# Patient Record
Sex: FEMALE | Race: BLACK OR AFRICAN AMERICAN | Employment: OTHER | URBAN - METROPOLITAN AREA
[De-identification: names, ages, dates, MRNs, and addresses within clinical notes are randomized per-mention and may not be internally consistent; named-entity substitution may affect disease eponyms.]

---

## 2018-01-15 ENCOUNTER — OFFICE VISIT (OUTPATIENT)
Dept: FAMILY MEDICINE CLINIC | Age: 68
End: 2018-01-15

## 2018-01-15 VITALS
WEIGHT: 166 LBS | HEIGHT: 61 IN | TEMPERATURE: 98.3 F | BODY MASS INDEX: 31.34 KG/M2 | OXYGEN SATURATION: 98 % | HEART RATE: 65 BPM | RESPIRATION RATE: 16 BRPM | SYSTOLIC BLOOD PRESSURE: 140 MMHG | DIASTOLIC BLOOD PRESSURE: 80 MMHG

## 2018-01-15 DIAGNOSIS — N32.81 OVERACTIVE BLADDER: ICD-10-CM

## 2018-01-15 DIAGNOSIS — J42 CHRONIC BRONCHITIS, UNSPECIFIED CHRONIC BRONCHITIS TYPE (HCC): ICD-10-CM

## 2018-01-15 DIAGNOSIS — E78.00 PURE HYPERCHOLESTEROLEMIA: ICD-10-CM

## 2018-01-15 DIAGNOSIS — D64.9 ANEMIA, UNSPECIFIED TYPE: ICD-10-CM

## 2018-01-15 DIAGNOSIS — J30.9 CHRONIC ALLERGIC RHINITIS, UNSPECIFIED SEASONALITY, UNSPECIFIED TRIGGER: ICD-10-CM

## 2018-01-15 DIAGNOSIS — J45.909 UNCOMPLICATED ASTHMA, UNSPECIFIED ASTHMA SEVERITY, UNSPECIFIED WHETHER PERSISTENT: ICD-10-CM

## 2018-01-15 RX ORDER — LANOLIN ALCOHOL/MO/W.PET/CERES
325 CREAM (GRAM) TOPICAL
COMMUNITY
End: 2018-04-23

## 2018-01-15 RX ORDER — HYDROCODONE BITARTRATE AND ACETAMINOPHEN 5; 325 MG/1; MG/1
TABLET ORAL
Refills: 0 | COMMUNITY
Start: 2017-10-28 | End: 2019-08-19

## 2018-01-15 RX ORDER — AZITHROMYCIN 250 MG/1
TABLET, FILM COATED ORAL
Qty: 6 TAB | Refills: 0 | Status: SHIPPED | OUTPATIENT
Start: 2018-01-15 | End: 2018-02-15 | Stop reason: ALTCHOICE

## 2018-01-15 RX ORDER — CYCLOSPORINE 0.5 MG/ML
1 EMULSION OPHTHALMIC
COMMUNITY
End: 2021-03-19

## 2018-01-15 RX ORDER — OMEPRAZOLE AND SODIUM BICARBONATE 40; 1100 MG/1; MG/1
40 CAPSULE ORAL
COMMUNITY
End: 2018-03-14 | Stop reason: SDUPTHER

## 2018-01-15 RX ORDER — GABAPENTIN 100 MG/1
100 CAPSULE ORAL 2 TIMES DAILY
COMMUNITY
End: 2018-11-26

## 2018-01-15 RX ORDER — METFORMIN HYDROCHLORIDE 1000 MG/1
1000 TABLET, FILM COATED, EXTENDED RELEASE ORAL 2 TIMES DAILY
COMMUNITY
End: 2018-03-14 | Stop reason: SDUPTHER

## 2018-01-15 NOTE — PATIENT INSTRUCTIONS

## 2018-01-15 NOTE — PROGRESS NOTES
Chief Complaint   Patient presents with   Delilah Sames Establish Care    Headache    Cold    Other     \"Pain\"

## 2018-01-15 NOTE — PROGRESS NOTES
Delvin Boyle, 69 y.o.,  female    SUBJECTIVE  Cough x few days (pt new to the office)    C/o productive cough, nasal congestion, sinus pressure. Pt reports h.o asthma and COPD, using breo and prn albuterol. Denies fever, wheezing, has not needed to use rescue inhaler she says. She follows Dr. Derrell Torre (pulm). Also has chronic allergic rhinitis, and on singulair and flonase. NIDDM- reports being on metformin, glipizide and januvia. Says she gets some hypoglycemic episodes at times. Does not recall last a1c. She reports HL as well, she is not on a statin currently. OAB- reports recently started on oxybutynin and responding well to this. Chronic anemia- says past few years has developed this, on po fe. GERD- on prilosec    Chronic neck pain with radiculopathy- reports following dr. Roland Stock for this, currently on gabapentin/flexeril and norco prn. She lives alone, doing ADLs independently    ROS:  See HPI, all others negative        Patient Active Problem List   Diagnosis Code    Pure hypercholesterolemia E78.00    Overactive bladder N32.81    Chronic allergic rhinitis J30.9    Anemia U65.4    Uncomplicated asthma J42.049    Uncontrolled type 2 diabetes mellitus without complication, without long-term current use of insulin ([facility]) E11.65       Current Outpatient Prescriptions   Medication Sig Dispense Refill    SITagliptin (JANUVIA) 50 mg tablet 50 mg.      metFORMIN (GLUMETZA) 1,000 mg TG24 24 hour tablet 1,000 mg.  ferrous sulfate 325 mg (65 mg iron) tablet 325 mg.      gabapentin (NEURONTIN) 100 mg capsule 100 mg.      omeprazole-sodium bicarbonate (ZEGERID) 40-1.1 mg-gram capsule 40 mg.      fluticasone (VERAMYST) 27.5 mcg/actuation nasal spray [place]  in Nose.  cycloSPORINE (RESTASIS) 0.05 % ophthalmic emulsion Instill 1 Drop in affected eye(s) Every 12 hours.       HYDROcodone-acetaminophen (NORCO) 5-325 mg per tablet   0    mometasone-formoterol (DULERA) 100-5 mcg/actuation HFA inhaler Take 2 Puffs by inhalation two (2) times a day.  azithromycin (ZITHROMAX) 250 mg tablet Take two tablets today then one tablet daily 6 Tab 0    ipratropium-albuterol (COMBIVENT RESPIMAT)  mcg/actuation inhaler Take 1 inhalation inhaled by mouth Take As Needed. Allergies   Allergen Reactions    Iodine Swelling    Nabumetone Swelling    Penicillins Unknown (comments)    Shellfish Containing Products Swelling    Sulfa (Sulfonamide Antibiotics) Swelling       Past Medical History:   Diagnosis Date    Allergic rhinitis     Anemia     Asthma     COPD (chronic obstructive pulmonary disease) (HCC)     Diabetes (HCC)     GERD (gastroesophageal reflux disease)     Headache     Hypercholesterolemia     OAB (overactive bladder)     Radiculopathy        Social History     Social History    Marital status:      Spouse name: N/A    Number of children: N/A    Years of education: N/A     Occupational History    Not on file. Social History Main Topics    Smoking status: Former Smoker     Years: 20.00    Smokeless tobacco: Former User     Quit date: 1/1/1999    Alcohol use Yes    Drug use: No    Sexual activity: Not Currently     Partners: Male     Other Topics Concern    Not on file     Social History Narrative    No narrative on file       History reviewed. No pertinent family history. OBJECTIVE    Physical Exam:     Visit Vitals    /80 (BP 1 Location: Left arm, BP Patient Position: Sitting)    Pulse 65    Temp 98.3 °F (36.8 °C) (Oral)    Resp 16    Ht 5' 1\" (1.549 m)    Wt 166 lb (75.3 kg)    SpO2 98%    BMI 31.37 kg/m2       General: alert, AA, in no apparent distress or pain  Head: atraumatic.  Non-tender maxillary and frontal sinuses  Eyes: Lids with no discharge, no matting, conjunctivae clear and non injected, full EOMs, PERLLA  Ears: pinna non-tender, external auditory canal patent, TM intact  Mouth/throat:tonsils non enlarged, pharynx non erythematous and no lesion, nasal mucosa normal  Neck: supple, no adenopathy palpated  CVS: normal rate, regular rhythm, distinct S1 and S2  Lungs:clear to ausculation bilaterally, no crackles, wheezing or rhonchi noted  Abdomen: normoactive bowel sounds, soft, non-tender  Extremities: no edema, no cyanosis,  Skin: warm, no lesions, rashes noted  Psych:  mood and affect normal        ASSESSMENT/PLAN  Diagnoses and all orders for this visit:    1. Uncontrolled type 2 diabetes mellitus without complication, without long-term current use of insulin ([facility]  Start eval  On metformin, januvia, consider stopping glipizide if w/ hypolgycemia  Advised FBG log and bring to next visit  -     METABOLIC PANEL, COMPREHENSIVE; Future  -     LIPID PANEL; Future  -     HEMOGLOBIN A1C W/O EAG; Future  -     MICROALBUMIN, UR, RAND W/ MICROALBUMIN/CREA RATIO; Future    2. Anemia, unspecified type  H/o, chronic  -     CBC WITH AUTOMATED DIFF; Future    3. Uncomplicated asthma, unspecified asthma severity, unspecified whether persistent  With COPD  Start zpack, cont breo, prn albuterol  Following Dr. Evans Vallejo    4. Overactive bladder  Responding to ditropan, to cont    5. Chronic allergic rhinitis, unspecified seasonality, unspecified trigger  On singulair, flonase    6. Pure hypercholesterolemia    7. Chronic bronchitis, unspecified chronic bronchitis type (HCC)  -     azithromycin (ZITHROMAX) 250 mg tablet; Take two tablets today then one tablet daily      Follow-up Disposition:  Return in about 2 weeks (around 1/29/2018), or if symptoms worsen or fail to improve. Patient understands plan of care. Patient has provided input and agrees with goals.

## 2018-01-15 NOTE — MR AVS SNAPSHOT
Visit Information Date & Time Provider Department Dept. Phone Encounter #  
 1/15/2018 10:30 AM Amelia Montoya, [address] [id number] Follow-up Instructions Return in about 2 weeks (around 1/29/2018), or if symptoms worsen or fail to improve. 2/2/2018  3:30 PM  
Any with Emory Issa MD  
[facility] [address] ([address]) Appt Note: np dx: urinary incontinence - per dr Yomi Peres [facility] [address] Upcoming Health Maintenance Date Due Hepatitis C Screening 1950 DTaP/Tdap/Td series (1 - Tdap) 5/21/1969 BREAST CANCER SCRN MAMMOGRAM 5/21/1998 FOBT Q 1 YEAR AGE 50-75 5/21/1998 ZOSTER VACCINE AGE 60> 3/21/2008 GLAUCOMA SCREENING Q2Y 5/21/2013 OSTEOPOROSIS SCREENING (DEXA) 5/21/2013 Pneumococcal 65+ Low/Medium Risk (1 of 2 - PCV13) 5/21/2013 MEDICARE YEARLY EXAM 5/21/2013 Allergies as of 1/15/2018  Never Reviewed Severity Noted Reaction Type Reactions Iodine  06/11/2010    Swelling Nabumetone  06/11/2010    Swelling Penicillins  06/11/2010    Unknown (comments) Shellfish Containing Products  06/11/2010    Swelling Sulfa (Sulfonamide Antibiotics)  06/11/2010    Swelling Current Immunizations  Never Reviewed No immunizations on file. Not reviewed this visit You Were Diagnosed With   
  
 Codes Comments Uncontrolled type 2 diabetes mellitus without complication, without long-term current use of insulin [facility]    -  Primary ICD-10-CM: E11.65 ICD-9-CM: 250.02 Anemia, unspecified type     ICD-10-CM: D64.9 ICD-9-CM: 285.9 Uncomplicated asthma, unspecified asthma severity, unspecified whether persistent     ICD-10-CM: J45.909 ICD-9-CM: 493.90 Overactive bladder     ICD-10-CM: N32.81 ICD-9-CM: 596.51   
 Chronic allergic rhinitis, unspecified seasonality, unspecified trigger     ICD-10-CM: J30.9 ICD-9-CM: 477.9 Pure hypercholesterolemia     ICD-10-CM: E78.00 ICD-9-CM: 272.0 Chronic bronchitis, unspecified chronic bronchitis type ([facility]     ICD-10-CM: Y63 ICD-9-CM: 491.9 Vitals BP Pulse Temp Resp Height(growth percentile) Weight(growth percentile) 140/80 (BP 1 Location: Left arm, BP Patient Position: Sitting) 65 98.3 °F (36.8 °C) (Oral) 16 5' 1\" (1.549 m) 166 lb (75.3 kg) SpO2 BMI OB Status Smoking Status 98% 31.37 kg/m2 Hysterectomy Former Smoker Vitals History BMI and BSA Data Body Mass Index Body Surface Area  
 31.37 kg/m 2 1.8 m 2 Your Updated Medication List  
  
   
This list is accurate as of: 1/15/18 11:33 AM.  Always use your most recent med list.  
  
  
  
  
 azithromycin 250 mg tablet Commonly known as:  Sharonda Darling Take two tablets today then one tablet daily  
  
 cycloSPORINE 0.05 % ophthalmic emulsion Commonly known as:  RESTASIS Instill 1 Drop in affected eye(s) Every 12 hours. DULERA 100-5 mcg/actuation HFA inhaler Generic drug:  mometasone-formoterol Take 2 Puffs by inhalation two (2) times a day. ferrous sulfate 325 mg (65 mg iron) tablet 325 mg.  
  
 fluticasone 27.5 mcg/actuation nasal spray Commonly known as:  VERAMYST Spray  in Nose.  
  
 gabapentin 100 mg capsule Commonly known as:  NEURONTIN  
100 mg. HYDROcodone-acetaminophen 5-325 mg per tablet Commonly known as:  NORCO  
  
 ipratropium-albuterol  mcg/actuation inhaler Commonly known as:  Merl Lisbeth Take 1 inhalation inhaled by mouth Take As Needed. metFORMIN 1,000 mg Tg24 24 hour tablet Commonly known as:  GLUMETZA  
1,000 mg.  
  
 omeprazole-sodium bicarbonate 40-1.1 mg-gram capsule Commonly known as:  ZEGERID  
40 mg. SITagliptin 50 mg tablet Commonly known as:  Megan Raker 50 mg. Prescriptions Printed Refills  
 azithromycin (ZITHROMAX) 250 mg tablet 0 Sig: Take two tablets today then one tablet daily Class: Print Follow-up Instructions Return in about 2 weeks (around 1/29/2018), or if symptoms worsen or fail to improve. To-Do List   
 01/15/2018 Lab:  CBC WITH AUTOMATED DIFF   
  
 01/15/2018 Lab:  HEMOGLOBIN A1C W/O EAG   
  
 01/15/2018 Lab:  LIPID PANEL   
  
 01/15/2018 Lab:  METABOLIC PANEL, COMPREHENSIVE   
  
 01/15/2018 Lab:  MICROALBUMIN, UR, RAND W/ MICROALBUMIN/CREA RATIO Patient Instructions Learning About Diabetes Food Guidelines Your Care Instructions Meal planning is important to manage diabetes. It helps keep your blood sugar at a target level (which you set with your doctor). You don't have to eat special foods. You can eat what your family eats, including sweets once in a while. But you do have to pay attention to how often you eat and how much you eat of certain foods. You may want to work with a dietitian or a certified diabetes educator (CDE) to help you plan meals and snacks. A dietitian or CDE can also help you lose weight if that is one of your goals. What should you know about eating carbs? Managing the amount of carbohydrate (carbs) you eat is an important part of healthy meals when you have diabetes. Carbohydrate is found in many foods. · Learn which foods have carbs. And learn the amounts of carbs in different foods. ¨ Bread, cereal, pasta, and rice have about 15 grams of carbs in a serving. A serving is 1 slice of bread (1 ounce), ½ cup of cooked cereal, or 1/3 cup of cooked pasta or rice. ¨ Fruits have 15 grams of carbs in a serving. A serving is 1 small fresh fruit, such as an apple or orange; ½ of a banana; ½ cup of cooked or canned fruit; ½ cup of fruit juice; 1 cup of melon or raspberries; or 2 tablespoons of dried fruit. ¨ Milk and no-sugar-added yogurt have 15 grams of carbs in a serving. A serving is 1 cup of milk or 2/3 cup of no-sugar-added yogurt. ¨ Starchy vegetables have 15 grams of carbs in a serving. A serving is ½ cup of mashed potatoes or sweet potato; 1 cup winter squash; ½ of a small baked potato; ½ cup of cooked beans; or ½ cup cooked corn or green peas. · Learn how much carbs to eat each day and at each meal. A dietitian or CDE can teach you how to keep track of the amount of carbs you eat. This is called carbohydrate counting. · If you are not sure how to count carbohydrate grams, use the Plate Method to plan meals. It is a good, quick way to make sure that you have a balanced meal. It also helps you spread carbs throughout the day. ¨ Divide your plate by types of foods. Put non-starchy vegetables on half the plate, meat or other protein food on one-quarter of the plate, and a grain or starchy vegetable in the final quarter of the plate. To this you can add a small piece of fruit and 1 cup of milk or yogurt, depending on how many carbs you are supposed to eat at a meal. 
· Try to eat about the same amount of carbs at each meal. Do not \"save up\" your daily allowance of carbs to eat at one meal. 
· Proteins have very little or no carbs per serving. Examples of proteins are beef, chicken, turkey, fish, eggs, tofu, cheese, cottage cheese, and peanut butter. A serving size of meat is 3 ounces, which is about the size of a deck of cards. Examples of meat substitute serving sizes (equal to 1 ounce of meat) are 1/4 cup of cottage cheese, 1 egg, 1 tablespoon of peanut butter, and ½ cup of tofu. How can you eat out and still eat healthy? · Learn to estimate the serving sizes of foods that have carbohydrate. If you measure food at home, it will be easier to estimate the amount in a serving of restaurant food.  
· If the meal you order has too much carbohydrate (such as potatoes, corn, or baked beans), ask to have a low-carbohydrate food instead. Ask for a salad or green vegetables. · If you use insulin, check your blood sugar before and after eating out to help you plan how much to eat in the future. · If you eat more carbohydrate at a meal than you had planned, take a walk or do other exercise. This will help lower your blood sugar. What else should you know? · Limit saturated fat, such as the fat from meat and dairy products. This is a healthy choice because people who have diabetes are at higher risk of heart disease. So choose lean cuts of meat and nonfat or low-fat dairy products. Use olive or canola oil instead of butter or shortening when cooking. · Don't skip meals. Your blood sugar may drop too low if you skip meals and take insulin or certain medicines for diabetes. · Check with your doctor before you drink alcohol. Alcohol can cause your blood sugar to drop too low. Alcohol can also cause a bad reaction if you take certain diabetes medicines. Follow-up care is a key part of your treatment and safety. Be sure to make and go to all appointments, and call your doctor if you are having problems. It's also a good idea to know your test results and keep a list of the medicines you take. Where can you learn more? Go to http://danika-sophia.info/. Enter U061 in the search box to learn more about \"Learning About Diabetes Food Guidelines. \" Current as of: March 13, 2017 Content Version: 11.4 © 5473-6848 [employer]. Care instructions adapted under license by [employer] (which disclaims liability or warranty for this information). If you have questions about a medical condition or this instruction, always ask your healthcare professional. [place] [address] any warranty or liability for your use of this information. Introducing [facility]!    
 Lasha Robert introduces [employer] patient portal. Now you can access parts of your medical record, email your doctor's office, and request medication refills online. 1. In your internet browser, go to [email or web address] 2. Click on the First Time User? Click Here link in the Sign In box. You will see the New Member Sign Up page. 3. Enter your [employer] Access Code exactly as it appears below. You will not need to use this code after youve completed the sign-up process. If you do not sign up before the expiration date, you must request a new code. · [employer] Access Code: [email or web address] Expires: 4/15/2018 11:33 AM 
 
4. Enter the last four digits of your Social Security Number (xxxx) and Date of Birth (mm/dd/yyyy) as indicated and click Submit. You will be taken to the next sign-up page. 5. Create a [employer] ID. This will be your [employer] login ID and cannot be changed, so think of one that is secure and easy to remember. 6. Create a [employer] password. You can change your password at any time. 7. Enter your Password Reset Question and Answer. This can be used at a later time if you forget your password. 8. Enter your e-mail address. You will receive e-mail notification when new information is available in [address] 9. Click Sign Up. You can now view and download portions of your medical record. 10. Click the Download Summary menu link to download a portable copy of your medical information. If you have questions, please visit the Frequently Asked Questions section of the [employer] website. Remember, [employer] is NOT to be used for urgent needs. For medical emergencies, dial 911. Now available from your iPhone and Android! Please provide this summary of care documentation to your next provider. If you have any questions after today's visit, please call [phone].

## 2018-02-12 ENCOUNTER — HOSPITAL ENCOUNTER (OUTPATIENT)
Dept: LAB | Age: 68
Discharge: HOME | End: 2018-02-12
Payer: MEDICARE

## 2018-02-12 LAB
ALBUMIN SERPL-MCNC: 3.5 G/DL (ref 3.4–5)
ALBUMIN/GLOB SERPL: 1.1 {RATIO} (ref 0.8–1.7)
ALP SERPL-CCNC: 75 U/L (ref 45–117)
ALT SERPL-CCNC: 10 U/L (ref 13–56)
ANION GAP SERPL CALC-SCNC: 5 MMOL/L (ref 3–18)
AST SERPL-CCNC: 12 U/L (ref 15–37)
BASOPHILS # BLD: 0 K/UL (ref 0–0.06)
BASOPHILS NFR BLD: 0 % (ref 0–2)
BILIRUB SERPL-MCNC: 0.3 MG/DL (ref 0.2–1)
BUN SERPL-MCNC: 11 MG/DL (ref 7–18)
BUN/CREAT SERPL: 10 (ref 12–20)
CALCIUM SERPL-MCNC: 9 MG/DL (ref 8.5–10.1)
CHLORIDE SERPL-SCNC: 109 MMOL/L (ref 100–108)
CHOLEST SERPL-MCNC: 182 MG/DL
CO2 SERPL-SCNC: 29 MMOL/L (ref 21–32)
CREAT SERPL-MCNC: 1.05 MG/DL (ref 0.6–1.3)
CREAT UR-MCNC: 52.3 MG/DL (ref 30–125)
DIFFERENTIAL METHOD BLD: ABNORMAL
EOSINOPHIL # BLD: 0.2 K/UL (ref 0–0.4)
EOSINOPHIL NFR BLD: 3 % (ref 0–5)
ERYTHROCYTE [DISTWIDTH] IN BLOOD BY AUTOMATED COUNT: 15.8 % (ref 11.6–14.5)
GLOBULIN SER CALC-MCNC: 3.2 G/DL (ref 2–4)
GLUCOSE SERPL-MCNC: 103 MG/DL (ref 74–99)
HBA1C MFR BLD: 6.9 % (ref 4.2–5.6)
HCT VFR BLD AUTO: 29.1 % (ref 35–45)
HDLC SERPL-MCNC: 71 MG/DL (ref 40–60)
HDLC SERPL: 2.6 {RATIO} (ref 0–5)
HGB BLD-MCNC: 9.8 G/DL (ref 12–16)
LDLC SERPL CALC-MCNC: 94.2 MG/DL (ref 0–100)
LIPID PROFILE,FLP: ABNORMAL
LYMPHOCYTES # BLD: 1.8 K/UL (ref 0.9–3.6)
LYMPHOCYTES NFR BLD: 34 % (ref 21–52)
MCH RBC QN AUTO: 27.3 PG (ref 24–34)
MCHC RBC AUTO-ENTMCNC: 33.7 G/DL (ref 31–37)
MCV RBC AUTO: 81.1 FL (ref 74–97)
MICROALBUMIN UR-MCNC: 0.74 MG/DL (ref 0–3)
MICROALBUMIN/CREAT UR-RTO: 14 MG/G (ref 0–30)
MONOCYTES # BLD: 0.4 K/UL (ref 0.05–1.2)
MONOCYTES NFR BLD: 8 % (ref 3–10)
NEUTS SEG # BLD: 2.9 K/UL (ref 1.8–8)
NEUTS SEG NFR BLD: 55 % (ref 40–73)
PLATELET # BLD AUTO: 202 K/UL (ref 135–420)
PMV BLD AUTO: 9.1 FL (ref 9.2–11.8)
POTASSIUM SERPL-SCNC: 4.3 MMOL/L (ref 3.5–5.5)
PROT SERPL-MCNC: 6.7 G/DL (ref 6.4–8.2)
RBC # BLD AUTO: 3.59 M/UL (ref 4.2–5.3)
SODIUM SERPL-SCNC: 143 MMOL/L (ref 136–145)
TRIGL SERPL-MCNC: 84 MG/DL (ref ?–150)
VLDLC SERPL CALC-MCNC: 16.8 MG/DL
WBC # BLD AUTO: 5.4 K/UL (ref 4.6–13.2)

## 2018-02-12 PROCEDURE — 85025 COMPLETE CBC W/AUTO DIFF WBC: CPT | Performed by: FAMILY MEDICINE

## 2018-02-12 PROCEDURE — 36415 COLL VENOUS BLD VENIPUNCTURE: CPT | Performed by: FAMILY MEDICINE

## 2018-02-12 PROCEDURE — 82043 UR ALBUMIN QUANTITATIVE: CPT | Performed by: FAMILY MEDICINE

## 2018-02-12 PROCEDURE — 83036 HEMOGLOBIN GLYCOSYLATED A1C: CPT | Performed by: FAMILY MEDICINE

## 2018-02-12 PROCEDURE — 80061 LIPID PANEL: CPT | Performed by: FAMILY MEDICINE

## 2018-02-12 PROCEDURE — 80053 COMPREHEN METABOLIC PANEL: CPT | Performed by: FAMILY MEDICINE

## 2018-02-15 ENCOUNTER — OFFICE VISIT (OUTPATIENT)
Dept: FAMILY MEDICINE CLINIC | Age: 68
End: 2018-02-15

## 2018-02-15 VITALS
HEIGHT: 61 IN | OXYGEN SATURATION: 96 % | WEIGHT: 164 LBS | SYSTOLIC BLOOD PRESSURE: 118 MMHG | RESPIRATION RATE: 16 BRPM | TEMPERATURE: 98.1 F | BODY MASS INDEX: 30.96 KG/M2 | HEART RATE: 77 BPM | DIASTOLIC BLOOD PRESSURE: 60 MMHG

## 2018-02-15 DIAGNOSIS — Z78.0 POST-MENOPAUSAL: ICD-10-CM

## 2018-02-15 DIAGNOSIS — E78.00 PURE HYPERCHOLESTEROLEMIA: ICD-10-CM

## 2018-02-15 DIAGNOSIS — D64.9 NORMOCYTIC ANEMIA: ICD-10-CM

## 2018-02-15 DIAGNOSIS — Z11.59 ENCOUNTER FOR HEPATITIS C SCREENING TEST FOR LOW RISK PATIENT: ICD-10-CM

## 2018-02-15 DIAGNOSIS — E11.9 TYPE 2 DIABETES MELLITUS WITHOUT COMPLICATION, WITHOUT LONG-TERM CURRENT USE OF INSULIN (HCC): Primary | ICD-10-CM

## 2018-02-15 DIAGNOSIS — N32.81 OVERACTIVE BLADDER: ICD-10-CM

## 2018-02-15 DIAGNOSIS — L84 FOOT CALLUS: ICD-10-CM

## 2018-02-15 PROBLEM — E11.21 TYPE 2 DIABETES WITH NEPHROPATHY (HCC): Status: RESOLVED | Noted: 2018-02-15 | Resolved: 2018-02-15

## 2018-02-15 PROBLEM — E11.21 TYPE 2 DIABETES WITH NEPHROPATHY (HCC): Status: ACTIVE | Noted: 2018-02-15

## 2018-02-15 RX ORDER — OXYBUTYNIN CHLORIDE 5 MG/1
5 TABLET ORAL 2 TIMES DAILY
Qty: 60 TAB | Refills: 0
Start: 2018-02-15 | End: 2019-02-12 | Stop reason: SDUPTHER

## 2018-02-15 RX ORDER — ATORVASTATIN CALCIUM 10 MG/1
10 TABLET, FILM COATED ORAL DAILY
Qty: 90 TAB | Refills: 0 | Status: SHIPPED | OUTPATIENT
Start: 2018-02-15 | End: 2018-03-14 | Stop reason: SDUPTHER

## 2018-02-15 NOTE — PROGRESS NOTES
Irene Novoa, 69 y.o.,  female    SUBJECTIVE  Ff-up      NIDDM-FBG Log  . reports taking meds. No longer with hypoglycemia. revieweed labs    She reports HL as well, she is not on a statin currently. OAB- reports recently started on oxybutynin and responding well to this. Chronic anemia- says past few years has developed this, on po fe. Reports normal colonoscopy [facility]. GERD- on prilosec    Chronic neck pain with radiculopathy- reports following dr. Ting Villareal for this, currently on gabapentin/flexeril and norco prn. 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 Cough has improved- following Dr. Venus Guzman for asthma  CHRIS on CPAP- following  Dr. Tiff Martinez doing well with every few months counseling dr. Cameron Gates    She lives alone, doing ADLs independently    ROS:  See HPI, all others negative        Patient Active Problem List   Diagnosis Code    Pure hypercholesterolemia E78.00    Overactive bladder N32.81    Chronic allergic rhinitis J30.9    Anemia F77.6    Uncomplicated asthma Y03.487    Type 2 diabetes mellitus without complication, without long-term current use of insulin (HCC) E11.9       Current Outpatient Prescriptions   Medication Sig Dispense Refill    atorvastatin (LIPITOR) 10 mg tablet Take 1 Tab by mouth daily. 90 Tab 0    oxybutynin (DITROPAN) 5 mg tablet Take 1 Tab by mouth two (2) times a day. 60 Tab 0    SITagliptin (JANUVIA) 50 mg tablet 50 mg.      metFORMIN (GLUMETZA) 1,000 mg TG24 24 hour tablet 1,000 mg two (2) times a day.  ferrous sulfate 325 mg (65 mg iron) tablet 325 mg.      gabapentin (NEURONTIN) 100 mg capsule 100 mg.      omeprazole-sodium bicarbonate (ZEGERID) 40-1.1 mg-gram capsule 40 mg.      fluticasone (VERAMYST) 27.5 mcg/actuation nasal spray [place]  in Nose.  cycloSPORINE (RESTASIS) 0.05 % ophthalmic emulsion Instill 1 Drop in affected eye(s) Every 12 hours.       HYDROcodone-acetaminophen (Caitlin Alex) 5-325 mg per tablet   0    ipratropium-albuterol (COMBIVENT RESPIMAT)  mcg/actuation inhaler Take 1 inhalation inhaled by mouth Take As Needed.  mometasone-formoterol (DULERA) 100-5 mcg/actuation HFA inhaler Take 2 Puffs by inhalation two (2) times a day. Allergies   Allergen Reactions    Iodine Swelling    Nabumetone Swelling    Penicillins Unknown (comments)    Shellfish Containing Products Swelling    Sulfa (Sulfonamide Antibiotics) Swelling       Past Medical History:   Diagnosis Date    Allergic rhinitis     Anemia     Asthma     Candida vaginitis     CKD (chronic kidney disease) stage 3, GFR 30-59 ml/min     [facility]     Diabetes [facility]     Diabetes ([facility]     GERD (gastroesophageal reflux disease)     Headache     Hypercholesterolemia     Mild dementia     OAB (overactive bladder)     CHRIS on CPAP     Radiculopathy     Urinary incontinence     UTI (urinary tract infection)        Social History     Social History    Marital status:      Spouse name: N/A    Number of children: N/A    Years of education: N/A     Occupational History    Not on file. Social History Main Topics    Smoking status: Former Smoker     Years: 20.00    Smokeless tobacco: Former User     Quit date: 1/1/1999    Alcohol use Yes    Drug use: No    Sexual activity: Not Currently     Partners: Male     Other Topics Concern    Not on file     Social History Narrative       No family history on file.       OBJECTIVE    Physical Exam:     Visit Vitals    /60 (BP 1 Location: Left arm, BP Patient Position: Sitting)    Pulse 77    Temp 98.1 °F (36.7 °C) (Oral)    Resp 16    Ht 5' 1\" (1.549 m)    Wt 164 lb (74.4 kg)    SpO2 96%    BMI 30.99 kg/m2       General: alert, AA, in no apparent distress or pain  CVS: normal rate, regular rhythm, distinct S1 and S2  Lungs:clear to ausculation bilaterally, no crackles, wheezing or rhonchi noted  Abdomen: normoactive bowel sounds, soft, non-tender  Extremities: no edema, no cyanosis, feet: calluses on both soles R>L  Skin: warm, no lesions, rashes noted  Psych:  mood and affect normal    Results for orders placed or performed during the hospital encounter of 02/12/18   CBC WITH AUTOMATED DIFF   Result Value Ref Range    WBC 5.4 4.6 - 13.2 K/uL    RBC 3.59 (L) 4.20 - 5.30 M/uL    HGB 9.8 (L) 12.0 - 16.0 g/dL    HCT 29.1 (L) 35.0 - 45.0 %    MCV 81.1 74.0 - 97.0 FL    MCH 27.3 24.0 - 34.0 PG    MCHC 33.7 31.0 - 37.0 g/dL    RDW 15.8 (H) 11.6 - 14.5 %    PLATELET 180 188 - 646 K/uL    MPV 9.1 (L) 9.2 - 11.8 FL    NEUTROPHILS 55 40 - 73 %    LYMPHOCYTES 34 21 - 52 %    MONOCYTES 8 3 - 10 %    EOSINOPHILS 3 0 - 5 %    BASOPHILS 0 0 - 2 %    ABS. NEUTROPHILS 2.9 1.8 - 8.0 K/UL    ABS. LYMPHOCYTES 1.8 0.9 - 3.6 K/UL    ABS. MONOCYTES 0.4 0.05 - 1.2 K/UL    ABS. EOSINOPHILS 0.2 0.0 - 0.4 K/UL    ABS. BASOPHILS 0.0 0.0 - 0.06 K/UL    DF AUTOMATED     LIPID PANEL   Result Value Ref Range    LIPID PROFILE          Cholesterol, total 182 <200 MG/DL    Triglyceride 84 <150 MG/DL    HDL Cholesterol 71 (H) 40 - 60 MG/DL    LDL, calculated 94.2 0 - 100 MG/DL    VLDL, calculated 16.8 MG/DL    CHOL/HDL Ratio 2.6 0 - 5.0     METABOLIC PANEL, COMPREHENSIVE   Result Value Ref Range    Sodium 143 136 - 145 mmol/L    Potassium 4.3 3.5 - 5.5 mmol/L    Chloride 109 (H) 100 - 108 mmol/L    CO2 29 21 - 32 mmol/L    Anion gap 5 3.0 - 18 mmol/L    Glucose 103 (H) 74 - 99 mg/dL    BUN 11 7.0 - 18 MG/DL    Creatinine 1.05 0.6 - 1.3 MG/DL    BUN/Creatinine ratio 10 (L) 12 - 20      GFR est AA >60 >60 ml/min/1.73m2    GFR est non-AA 52 (L) >60 ml/min/1.73m2    Calcium 9.0 8.5 - 10.1 MG/DL    Bilirubin, total 0.3 0.2 - 1.0 MG/DL    ALT (SGPT) 10 (L) 13 - 56 U/L    AST (SGOT) 12 (L) 15 - 37 U/L    Alk.  phosphatase 75 45 - 117 U/L    Protein, total 6.7 6.4 - 8.2 g/dL    Albumin 3.5 3.4 - 5.0 g/dL    Globulin 3.2 2.0 - 4.0 g/dL    A-G Ratio 1.1 0.8 - 1.7     MICROALBUMIN, UR, RAND   Result Value Ref Range    Microalbumin,urine random 0.74 0 - 3.0 MG/DL    Creatinine, urine 52.30 30 - 125 mg/dL    Microalbumin/Creat ratio (mg/g creat) 14 0 - 30 mg/g   HEMOGLOBIN A1C W/O EAG   Result Value Ref Range    Hemoglobin A1c 6.9 (H) 4.2 - 5.6 %         ASSESSMENT/PLAN  Diagnoses and all orders for this visit:    1. Type 2 diabetes mellitus without complication, without long-term current use of insulin (HCC)  -      DIABETES FOOT EXAM  -     REFERRAL TO PODIATRY  Controlled  Cont current regimen  Eye exam- inquire on date and doctor  Foot exam- 2/18  Microalbumin 2/18  Lipid panel 2/18  a1c 2/18    2. Pure hypercholesterolemia  Andi cv risk 19.5% advised to start on statin  Trial lipitor 10 mg qhs  Recheck in 3 months    3. Overactive bladder  Responding to ditropan, to cont    4. Normocytic anemia  Per pt normal colonoscopy [facility]. - [facility], request records  Check:  -     IRON PROFILE; Future  -     FERRITIN; Future    5. Encounter for hepatitis C screening test for low risk patient  -     HEPATITIS C AB; Future    6. Post-menopausal  -     DEXA BONE DENSITY STUDY AXIAL; Future    7. Foot callus  -     REFERRAL TO PODIATRY    Other orders  -     atorvastatin (LIPITOR) 10 mg tablet; Take 1 Tab by mouth daily. -     oxybutynin (DITROPAN) 5 mg tablet; Take 1 Tab by mouth two (2) times a day. Will  Request colonoscopy, dilated eye exam records. Follow-up Disposition:  Return in about 2 months (around 4/15/2018), or if symptoms worsen or fail to improve. Patient understands plan of care. Patient has provided input and agrees with goals.

## 2018-02-15 NOTE — MR AVS SNAPSHOT
[facility] [address] 
[phone] Patient: Ady Dumont MRN: [id number] WIP:[id number] Visit Information Date & Time Provider Department Dept. Phone Encounter #  
 2/15/2018 10:15 AM Marcelino Yao, [address] [place] [address] Road [id number] Follow-up Instructions Return in about 2 months (around 4/15/2018), or if symptoms worsen or fail to improve. Upcoming Health Maintenance Date Due Hepatitis C Screening 1950 FOOT EXAM Q1 5/21/1958 EYE EXAM RETINAL OR DILATED Q1 5/21/1958 BREAST CANCER SCRN MAMMOGRAM 5/21/1998 FOBT Q 1 YEAR AGE 50-75 5/21/1998 ZOSTER VACCINE AGE 60> 3/21/2008 GLAUCOMA SCREENING Q2Y 5/21/2013 OSTEOPOROSIS SCREENING (DEXA) 5/21/2013 HEMOGLOBIN A1C Q6M 8/12/2018 MICROALBUMIN Q1 2/12/2019 LIPID PANEL Q1 2/12/2019 MEDICARE YEARLY EXAM 2/16/2019 DTaP/Tdap/Td series (2 - Td) 7/29/2024 Allergies as of 2/15/2018  Review Complete On: 2/15/2018 By: Shelia Aguila LPN Severity Noted Reaction Type Reactions Iodine  06/11/2010    Swelling Nabumetone  06/11/2010    Swelling Penicillins  06/11/2010    Unknown (comments) Shellfish Containing Products  06/11/2010    Swelling Sulfa (Sulfonamide Antibiotics)  06/11/2010    Swelling Current Immunizations  Reviewed on 2/14/2018 Name Date Pneumococcal Conjugate (PCV-13) 6/23/2016 Pneumococcal Vaccine (Unspecified Type) 10/3/2017 Tdap 7/29/2014 12:00 AM  
  
 Not reviewed this visit You Were Diagnosed With   
  
 Codes Comments Type 2 diabetes mellitus without complication, without long-term current use of insulin (HCC)    -  Primary ICD-10-CM: E11.9 ICD-9-CM: 250.00 Pure hypercholesterolemia     ICD-10-CM: E78.00 ICD-9-CM: 272.0 Overactive bladder     ICD-10-CM: N32.81 ICD-9-CM: 596.51 Normocytic anemia     ICD-10-CM: D64.9 ICD-9-CM: 285.9 Encounter for hepatitis C screening test for low risk patient     ICD-10-CM: Z11.59 
ICD-9-CM: V73.89 Post-menopausal     ICD-10-CM: Z78.0 ICD-9-CM: V49.81 Foot callus     ICD-10-CM: L84 
ICD-9-CM: 535 Vitals BP Pulse Temp Resp Height(growth percentile) Weight(growth percentile)  
 118/60 (BP 1 Location: Left arm, BP Patient Position: Sitting) 77 98.1 °F (36.7 °C) (Oral) 16 5' 1\" (1.549 m) 164 lb (74.4 kg) SpO2 BMI OB Status Smoking Status 96% 30.99 kg/m2 Hysterectomy Former Smoker BMI and BSA Data Body Mass Index Body Surface Area 30.99 kg/m 2 1.79 m 2 Preferred Pharmacy Pharmacy Name Phone [address] [facility], [address] [facility] Your Updated Medication List  
  
   
This list is accurate as of: 2/15/18 11:08 AM.  Always use your most recent med list.  
  
  
  
  
 atorvastatin 10 mg tablet Commonly known as:  LIPITOR Take 1 Tab by mouth daily. cycloSPORINE 0.05 % ophthalmic emulsion Commonly known as:  RESTASIS Instill 1 Drop in affected eye(s) Every 12 hours. DULERA 100-5 mcg/actuation HFA inhaler Generic drug:  mometasone-formoterol Take 2 Puffs by inhalation two (2) times a day. ferrous sulfate 325 mg (65 mg iron) tablet 325 mg.  
  
 fluticasone 27.5 mcg/actuation nasal spray Commonly known as:  VERAMYST Spray  in Nose.  
  
 gabapentin 100 mg capsule Commonly known as:  NEURONTIN  
100 mg. HYDROcodone-acetaminophen 5-325 mg per tablet Commonly known as:  NORCO  
  
 ipratropium-albuterol  mcg/actuation inhaler Commonly known as:  Ton Rachel Take 1 inhalation inhaled by mouth Take As Needed. metFORMIN 1,000 mg Tg24 24 hour tablet Commonly known as:  GLUMETZA  
1,000 mg two (2) times a day. omeprazole-sodium bicarbonate 40-1.1 mg-gram capsule Commonly known as:  ZEGERID  
40 mg.  
  
 oxybutynin 5 mg tablet Commonly known as:  PAJFDVOA Take 1 Tab by mouth two (2) times a day. SITagliptin 50 mg tablet Commonly known as:  Miguel Ángel Remedies 50 mg.  
  
  
  
  
Prescriptions Sent to Pharmacy Refills  
 atorvastatin (LIPITOR) 10 mg tablet 0 Sig: Take 1 Tab by mouth daily. Class: Normal  
 Pharmacy: [address] Ph #: [phone] Route: Oral  
  
We Performed the Following  DIABETES FOOT EXAM [HM7 Custom] REFERRAL TO PODIATRY [REF90 Custom] Follow-up Instructions Return in about 2 months (around 4/15/2018), or if symptoms worsen or fail to improve. To-Do List   
 02/15/2018 Imaging:  DEXA BONE DENSITY STUDY AXIAL   
  
 02/15/2018 Lab:  FERRITIN   
  
 02/15/2018 Lab:  HEPATITIS C AB   
  
 02/15/2018 Lab:  IRON PROFILE Referral Information Referral ID Referred By Referred To  
  
 [id number] SUNITH[facility] STEINBERG [facility], [address] [place] [address] Str. Phone: [phone] Fax: [phone] Visits Status Start Date End Date 1 New Request 2/15/18 2/15/19 If your referral has a status of pending review or denied, additional information will be sent to support the outcome of this decision. Patient Instructions Learning About Diabetes Food Guidelines Your Care Instructions Meal planning is important to manage diabetes. It helps keep your blood sugar at a target level (which you set with your doctor). You don't have to eat special foods. You can eat what your family eats, including sweets once in a while. But you do have to pay attention to how often you eat and how much you eat of certain foods. You may want to work with a dietitian or a certified diabetes educator (CDE) to help you plan meals and snacks. A dietitian or CDE can also help you lose weight if that is one of your goals. What should you know about eating carbs? Managing the amount of carbohydrate (carbs) you eat is an important part of healthy meals when you have diabetes. Carbohydrate is found in many foods. · Learn which foods have carbs. And learn the amounts of carbs in different foods. ¨ Bread, cereal, pasta, and rice have about 15 grams of carbs in a serving. A serving is 1 slice of bread (1 ounce), ½ cup of cooked cereal, or 1/3 cup of cooked pasta or rice. ¨ Fruits have 15 grams of carbs in a serving. A serving is 1 small fresh fruit, such as an apple or orange; ½ of a banana; ½ cup of cooked or canned fruit; ½ cup of fruit juice; 1 cup of melon or raspberries; or 2 tablespoons of dried fruit. ¨ Milk and no-sugar-added yogurt have 15 grams of carbs in a serving. A serving is 1 cup of milk or 2/3 cup of no-sugar-added yogurt. ¨ Starchy vegetables have 15 grams of carbs in a serving. A serving is ½ cup of mashed potatoes or sweet potato; 1 cup winter squash; ½ of a small baked potato; ½ cup of cooked beans; or ½ cup cooked corn or green peas. · Learn how much carbs to eat each day and at each meal. A dietitian or CDE can teach you how to keep track of the amount of carbs you eat. This is called carbohydrate counting. · If you are not sure how to count carbohydrate grams, use the Plate Method to plan meals. It is a good, quick way to make sure that you have a balanced meal. It also helps you spread carbs throughout the day. ¨ Divide your plate by types of foods. Put non-starchy vegetables on half the plate, meat or other protein food on one-quarter of the plate, and a grain or starchy vegetable in the final quarter of the plate.  To this you can add a small piece of fruit and 1 cup of milk or yogurt, depending on how many carbs you are supposed to eat at a meal. 
· Try to eat about the same amount of carbs at each meal. Do not \"save up\" your daily allowance of carbs to eat at one meal. 
 · Proteins have very little or no carbs per serving. Examples of proteins are beef, chicken, turkey, fish, eggs, tofu, cheese, cottage cheese, and peanut butter. A serving size of meat is 3 ounces, which is about the size of a deck of cards. Examples of meat substitute serving sizes (equal to 1 ounce of meat) are 1/4 cup of cottage cheese, 1 egg, 1 tablespoon of peanut butter, and ½ cup of tofu. How can you eat out and still eat healthy? · Learn to estimate the serving sizes of foods that have carbohydrate. If you measure food at home, it will be easier to estimate the amount in a serving of restaurant food. · If the meal you order has too much carbohydrate (such as potatoes, corn, or baked beans), ask to have a low-carbohydrate food instead. Ask for a salad or green vegetables. · If you use insulin, check your blood sugar before and after eating out to help you plan how much to eat in the future. · If you eat more carbohydrate at a meal than you had planned, take a walk or do other exercise. This will help lower your blood sugar. What else should you know? · Limit saturated fat, such as the fat from meat and dairy products. This is a healthy choice because people who have diabetes are at higher risk of heart disease. So choose lean cuts of meat and nonfat or low-fat dairy products. Use olive or canola oil instead of butter or shortening when cooking. · Don't skip meals. Your blood sugar may drop too low if you skip meals and take insulin or certain medicines for diabetes. · Check with your doctor before you drink alcohol. Alcohol can cause your blood sugar to drop too low. Alcohol can also cause a bad reaction if you take certain diabetes medicines. Follow-up care is a key part of your treatment and safety. Be sure to make and go to all appointments, and call your doctor if you are having problems. It's also a good idea to know your test results and keep a list of the medicines you take. Where can you learn more? Go to http://danika-sophia.info/. Enter B525 in the search box to learn more about \"Learning About Diabetes Food Guidelines. \" Current as of: March 13, 2017 Content Version: 11.4 © 4044-1104 Healthwise, Incorporated. Care instructions adapted under license by [employer] (which disclaims liability or warranty for this information). If you have questions about a medical condition or this instruction, always ask your healthcare professional. [address] any warranty or liability for your use of this information. Introducing [facility]! Debbora Form introduces [employer] patient portal. Now you can access parts of your medical record, email your doctor's office, and request medication refills online. 1. In your internet browser, go to https://Source Audio. Tap 'n Tap/Source Audio 2. Click on the First Time User? Click Here link in the Sign In box. You will see the New Member Sign Up page. 3. Enter your [employer] Access Code exactly as it appears below. You will not need to use this code after youve completed the sign-up process. If you do not sign up before the expiration date, you must request a new code. · [employer] Access Code: [email or web address] Expires: 4/15/2018 11:33 AM 
 
4. Enter the last four digits of your Social Security Number (xxxx) and Date of Birth (mm/dd/yyyy) as indicated and click Submit. You will be taken to the next sign-up page. 5. Create a NewVoiceMediat ID. This will be your [employer] login ID and cannot be changed, so think of one that is secure and easy to remember. 6. Create a [employer] password. You can change your password at any time. 7. Enter your Password Reset Question and Answer. This can be used at a later time if you forget your password. 8. Enter your e-mail address. You will receive e-mail notification when new information is available in [address] 9. Click Sign Up. You can now view and download portions of your medical record. 10. Click the Download Summary menu link to download a portable copy of your medical information. If you have questions, please visit the Frequently Asked Questions section of the [employer] website. Remember, [employer] is NOT to be used for urgent needs. For medical emergencies, dial 911. Now available from your iPhone and Android! Please provide this summary of care documentation to your next provider. Your primary care clinician is listed as Sabino Gutierrez. If you have any questions after today's visit, please call [phone].

## 2018-02-15 NOTE — PROGRESS NOTES
Chief Complaint   Patient presents with    Diabetes    Anemia    Asthma    Overactive Bladder    Results     1. Have you been to the ER, urgent care clinic since your last visit? Hospitalized since your last visit? No    2. Have you seen or consulted any other health care providers outside of the [address] [place] [address] since your last visit? Include any pap smears or colon screening.  Yes When: 1/30/18 Where: Dr. Nabeel Mack and PT Reason for visit: Shoulder injury

## 2018-02-15 NOTE — PATIENT INSTRUCTIONS

## 2018-02-23 ENCOUNTER — HOSPITAL ENCOUNTER (OUTPATIENT)
Dept: BONE DENSITY | Age: 68
Discharge: HOME | End: 2018-02-23
Attending: FAMILY MEDICINE
Payer: MEDICARE

## 2018-02-23 DIAGNOSIS — Z78.0 POST-MENOPAUSAL: ICD-10-CM

## 2018-02-23 PROCEDURE — 77080 DXA BONE DENSITY AXIAL: CPT

## 2018-03-08 ENCOUNTER — HOSPITAL ENCOUNTER (OUTPATIENT)
Dept: LAB | Age: 68
Discharge: HOME | End: 2018-03-08

## 2018-03-08 PROCEDURE — 99001 SPECIMEN HANDLING PT-LAB: CPT | Performed by: FAMILY MEDICINE

## 2018-03-09 LAB
FERRITIN SERPL-MCNC: 193 NG/ML (ref 15–150)
HCV AB S/CO SERPL IA: <0.1 S/CO RATIO (ref 0–0.9)
IRON SATN MFR SERPL: 17 % (ref 15–55)
IRON SERPL-MCNC: 39 UG/DL (ref 27–139)
TIBC SERPL-MCNC: 226 UG/DL (ref 250–450)
UIBC SERPL-MCNC: 187 UG/DL (ref 118–369)

## 2018-03-14 NOTE — TELEPHONE ENCOUNTER
This patient contacted office for the following prescriptions to be filled:    Medication requested :   Requested Prescriptions     Pending Prescriptions Disp Refills    omeprazole-sodium bicarbonate (ZEGERID) 40-1.1 mg-gram capsule      gabapentin (NEURONTIN) 100 mg capsule       Si Cap.  metFORMIN (GLUMETZA) 1,000 mg TG24 24 hour tablet       Si,000 mg two (2) times a day.  atorvastatin (LIPITOR) 10 mg tablet 90 Tab 0     Sig: Take 1 Tab by mouth daily.  ipratropium-albuterol (COMBIVENT RESPIMAT)  mcg/actuation inhaler 1 Inhaler      PCP: rosy   Pharmacy or Print:  [facility]  Mail order or Local pharmacy [phone]    Scheduled appointment if not seen by current providers in office: lov  2/15/18 anthony     Pt also states she have several question about paper work she has and what is it for.

## 2018-03-15 ENCOUNTER — TELEPHONE (OUTPATIENT)
Dept: FAMILY MEDICINE CLINIC | Age: 68
End: 2018-03-15

## 2018-03-15 NOTE — TELEPHONE ENCOUNTER
Patient identified with 2 identifiers (name and ). Patient has concerns of memory loss and requesting medication. Advised patient that she will need to schedule an appointment . Patient opted to discussed at her next visit in April.

## 2018-03-15 NOTE — TELEPHONE ENCOUNTER
Pt states that she would like to get something for memory loss. She states that her previous Dr had her taking Ginkgo biloba but hse states that its not working. She would like to know if anything had been sent to her sleep apnea dr. Please advise.

## 2018-03-16 RX ORDER — ATORVASTATIN CALCIUM 10 MG/1
10 TABLET, FILM COATED ORAL DAILY
Qty: 90 TAB | Refills: 0 | Status: SHIPPED | OUTPATIENT
Start: 2018-03-16 | End: 2018-09-24 | Stop reason: SDUPTHER

## 2018-03-16 RX ORDER — GABAPENTIN 100 MG/1
100 CAPSULE ORAL
OUTPATIENT
Start: 2018-03-16

## 2018-03-16 RX ORDER — OMEPRAZOLE AND SODIUM BICARBONATE 40; 1100 MG/1; MG/1
1 CAPSULE ORAL DAILY
Qty: 90 CAP | Refills: 0 | Status: SHIPPED | OUTPATIENT
Start: 2018-03-16 | End: 2018-03-22 | Stop reason: SDUPTHER

## 2018-03-16 RX ORDER — METFORMIN HYDROCHLORIDE 1000 MG/1
1000 TABLET, FILM COATED, EXTENDED RELEASE ORAL 2 TIMES DAILY
Qty: 180 TAB | Refills: 0 | Status: SHIPPED | OUTPATIENT
Start: 2018-03-16 | End: 2018-03-30

## 2018-03-20 NOTE — TELEPHONE ENCOUNTER
Spoke with marko while I had the patient on hold to see if we should have patient come in for a medication reconciliation appointment since there seems to be some confusion with medications. Agreed she should be scheduled Ladarius Lyon (ZOEY) for med rec. Patient verbalized understanding and she will call tomorrow morning to schedule with Ladarius Lyon. When patient calls back please schedule with Ladarius Lyon for next week and advise patient to bring every single medication bottle she has.

## 2018-03-20 NOTE — PROGRESS NOTES
No evidence of iron deficiency, will try her off po fe and monitor.    pls keep appt in April to discuss further, hold on oral iron until then  pls notify pt

## 2018-03-22 ENCOUNTER — PATIENT OUTREACH (OUTPATIENT)
Dept: FAMILY MEDICINE CLINIC | Age: 68
End: 2018-03-22

## 2018-03-22 RX ORDER — LANOLIN ALCOHOL/MO/W.PET/CERES
500 CREAM (GRAM) TOPICAL DAILY
COMMUNITY
End: 2019-08-19

## 2018-03-22 RX ORDER — FLUTICASONE FUROATE AND VILANTEROL 200; 25 UG/1; UG/1
1 POWDER RESPIRATORY (INHALATION) DAILY
Status: ON HOLD | COMMUNITY

## 2018-03-22 RX ORDER — ASPIRIN 81 MG/1
TABLET ORAL DAILY
COMMUNITY
End: 2019-08-19

## 2018-03-22 RX ORDER — MONTELUKAST SODIUM 10 MG/1
10 TABLET ORAL DAILY
COMMUNITY
End: 2018-03-22 | Stop reason: SDUPTHER

## 2018-03-22 RX ORDER — CYCLOBENZAPRINE HCL 5 MG
5 TABLET ORAL
COMMUNITY
End: 2019-08-19

## 2018-03-22 NOTE — PROGRESS NOTES
Met with patient at patient request.  Patient was supposed to be seeing NN for med rec but first problem she started with was she did not have a medicaid card so NN called [facility] and requested that they send new card to patient. Med rec completed and reorder request sent to PCP.

## 2018-03-26 RX ORDER — OMEPRAZOLE AND SODIUM BICARBONATE 40; 1100 MG/1; MG/1
1 CAPSULE ORAL DAILY
Qty: 90 CAP | Refills: 2 | Status: SHIPPED | OUTPATIENT
Start: 2018-03-26 | End: 2018-03-30

## 2018-03-26 RX ORDER — FLUTICASONE FUROATE AND VILANTEROL 200; 25 UG/1; UG/1
1 POWDER RESPIRATORY (INHALATION) DAILY
OUTPATIENT
Start: 2018-03-26

## 2018-03-26 RX ORDER — GABAPENTIN 100 MG/1
100 CAPSULE ORAL
OUTPATIENT
Start: 2018-03-26

## 2018-03-26 RX ORDER — MONTELUKAST SODIUM 10 MG/1
10 TABLET ORAL DAILY
Qty: 90 TAB | Refills: 2 | Status: SHIPPED | OUTPATIENT
Start: 2018-03-26 | End: 2019-02-12 | Stop reason: SDUPTHER

## 2018-03-26 NOTE — TELEPHONE ENCOUNTER
pls request prescribing providers:   inhaler from pulmonologist   and gabapentin from dr Vicente Caraballo.   thanks

## 2018-03-27 ENCOUNTER — TELEPHONE (OUTPATIENT)
Dept: FAMILY MEDICINE CLINIC | Age: 68
End: 2018-03-27

## 2018-03-27 ENCOUNTER — PATIENT OUTREACH (OUTPATIENT)
Dept: FAMILY MEDICINE CLINIC | Age: 68
End: 2018-03-27

## 2018-03-27 NOTE — PROGRESS NOTES
Patient called today because she got a denial letter for medicaid but does not understand why she was denied to make matters worse when she called cover [place] they put her through to a [employer] who told her that she did qualify which has confused the patient. She is still very worried abut her copays that used to be paid and now she is being charged. I suggested she call her secondary insurance and find out what changed. NN also suggested that we meet so that I can look at the letter. Patient can not come today but said she would come by the office tomorrow morning.

## 2018-03-27 NOTE — PROGRESS NOTES
Contacted patient and verified identity using name and date of birth (2- identifiers). Patient advised no evidence of iron deficiency. Informed patient to hold off on oral iron until discussed at her 4/16/18 appt. Patient voiced understanding.

## 2018-03-27 NOTE — TELEPHONE ENCOUNTER
[facility] is requesting a Prior Auth for RX Omepr Sod Bicarb  mg cap. Please call [phone].        Sent fax request to nurses

## 2018-03-27 NOTE — TELEPHONE ENCOUNTER
Patient informed to get inhaler from pulmonary specialist. Patient states Dr. Nabeel Mack does not prescribe her gabapentin. She states her previous PCP had refilled in the past. Please advise.

## 2018-03-28 ENCOUNTER — TELEPHONE (OUTPATIENT)
Dept: FAMILY MEDICINE CLINIC | Age: 68
End: 2018-03-28

## 2018-03-28 NOTE — TELEPHONE ENCOUNTER
She wanted to let Kvng Means know that she was able to get her insurance straight for transportation and all and if Kvng Means needs anything else to call her.

## 2018-03-30 RX ORDER — PANTOPRAZOLE SODIUM 40 MG/1
40 TABLET, DELAYED RELEASE ORAL DAILY
Qty: 90 TAB | Refills: 0 | Status: SHIPPED | OUTPATIENT
Start: 2018-03-30 | End: 2018-06-27 | Stop reason: SDUPTHER

## 2018-03-30 RX ORDER — METFORMIN HYDROCHLORIDE 500 MG/1
1000 TABLET, EXTENDED RELEASE ORAL
Qty: 180 TAB | Refills: 0 | Status: SHIPPED | OUTPATIENT
Start: 2018-03-30 | End: 2018-08-13 | Stop reason: SDUPTHER

## 2018-04-20 ENCOUNTER — OFFICE VISIT (OUTPATIENT)
Dept: FAMILY MEDICINE CLINIC | Age: 68
End: 2018-04-20

## 2018-04-20 VITALS
BODY MASS INDEX: 30.58 KG/M2 | OXYGEN SATURATION: 96 % | SYSTOLIC BLOOD PRESSURE: 134 MMHG | RESPIRATION RATE: 16 BRPM | WEIGHT: 162 LBS | HEART RATE: 80 BPM | TEMPERATURE: 97.4 F | HEIGHT: 61 IN | DIASTOLIC BLOOD PRESSURE: 78 MMHG

## 2018-04-20 DIAGNOSIS — E78.00 PURE HYPERCHOLESTEROLEMIA: ICD-10-CM

## 2018-04-20 DIAGNOSIS — D64.9 NORMOCYTIC ANEMIA: ICD-10-CM

## 2018-04-20 DIAGNOSIS — Z12.39 SCREENING FOR BREAST CANCER: ICD-10-CM

## 2018-04-20 DIAGNOSIS — N32.81 OVERACTIVE BLADDER: ICD-10-CM

## 2018-04-20 DIAGNOSIS — R68.83 CHILLS: ICD-10-CM

## 2018-04-20 DIAGNOSIS — Z71.89 ADVANCE CARE PLANNING: ICD-10-CM

## 2018-04-20 DIAGNOSIS — E11.9 TYPE 2 DIABETES MELLITUS WITHOUT COMPLICATION, WITHOUT LONG-TERM CURRENT USE OF INSULIN (HCC): Primary | ICD-10-CM

## 2018-04-20 DIAGNOSIS — Z00.00 MEDICARE ANNUAL WELLNESS VISIT, SUBSEQUENT: ICD-10-CM

## 2018-04-20 NOTE — PROGRESS NOTES
This is the Subsequent Medicare Annual Wellness Exam, performed 12 months or more after the Initial AWV or the last Subsequent AWV    I have reviewed the patient's medical history in detail and updated the computerized patient record. History     Past Medical History:   Diagnosis Date    Allergic rhinitis     Anemia     Asthma     Candida vaginitis     CKD (chronic kidney disease) stage 3, GFR 30-59 ml/min     COPD (chronic obstructive pulmonary disease) (HCC)     Diabetes (HCC)     Diabetes (HCC)     GERD (gastroesophageal reflux disease)     h pylori gastritis egd 11/14 dr Lex Fox    Headache     Hx of colonoscopy 11/05/2014    normal rpt 10 years, 11/2024, dr. Lex Fox    Hypercholesterolemia     Mild dementia     OAB (overactive bladder)     CHRIS on CPAP     Radiculopathy     Urinary incontinence     UTI (urinary tract infection)       Past Surgical History:   Procedure Laterality Date    HX COLONOSCOPY  11/05/2014    Normal/Repeat in 10 years/Dr. Ishaan Holly    HX HYSTERECTOMY  1970    HX ORTHOPAEDIC Right 02/2012    knee replacement      Current Outpatient Prescriptions   Medication Sig Dispense Refill    pantoprazole (PROTONIX) 40 mg tablet Take 1 Tab by mouth daily. 90 Tab 0    metFORMIN ER (GLUCOPHAGE XR) 500 mg tablet Take 2 Tabs by mouth daily (with dinner). 180 Tab 0    montelukast (SINGULAIR) 10 mg tablet Take 1 Tab by mouth daily. 90 Tab 2    cyclobenzaprine (FLEXERIL) 5 mg tablet Take 5 mg by mouth three (3) times daily as needed for Muscle Spasm(s).  aspirin delayed-release 81 mg tablet Take  by mouth daily.  cyanocobalamin (VITAMIN B12) 500 mcg tablet Take 500 mcg by mouth daily.  fluticasone-vilanterol (BREO ELLIPTA) 200-25 mcg/dose inhaler Take 1 Puff by inhalation daily.  atorvastatin (LIPITOR) 10 mg tablet Take 1 Tab by mouth daily. 90 Tab 0    oxybutynin (DITROPAN) 5 mg tablet Take 1 Tab by mouth two (2) times a day.  60 Tab 0    SITagliptin (JANUVIA) 50 mg tablet 50 mg.      gabapentin (NEURONTIN) 100 mg capsule 100 mg.      fluticasone (VERAMYST) 27.5 mcg/actuation nasal spray [place]  in Nose.  cycloSPORINE (RESTASIS) 0.05 % ophthalmic emulsion Instill 1 Drop in affected eye(s) Every 12 hours.  HYDROcodone-acetaminophen (NORCO) 5-325 mg per tablet   0    ipratropium-albuterol (COMBIVENT RESPIMAT)  mcg/actuation inhaler Take 1 inhalation inhaled by mouth Take As Needed.  ferrous sulfate 325 mg (65 mg iron) tablet 325 mg.      mometasone-formoterol (DULERA) 100-5 mcg/actuation HFA inhaler Take 2 Puffs by inhalation two (2) times a day. Allergies   Allergen Reactions    Iodine Swelling    Nabumetone Swelling    Penicillins Unknown (comments)    Shellfish Containing Products Swelling    Sulfa (Sulfonamide Antibiotics) Swelling     History reviewed. No pertinent family history. Social History   Substance Use Topics    Smoking status: Former Smoker     Years: 20.00    Smokeless tobacco: Former User     Quit date: 1/1/1999    Alcohol use Yes     Patient Active Problem List   Diagnosis Code    Pure hypercholesterolemia E78.00    Overactive bladder N32.81    Chronic allergic rhinitis J30.9    Anemia W87.6    Uncomplicated asthma G76.053    Type 2 diabetes mellitus without complication, without long-term current use of insulin ([facility] E11.9       Depression Risk Factor Screening:     PHQ over the last two weeks 1/15/2018   Little interest or pleasure in doing things Not at all   Feeling down, depressed or hopeless Several days   Total Score PHQ 2 1     Alcohol Risk Factor Screening: You do not drink alcohol or very rarely. Functional Ability and Level of Safety:   Hearing Loss  Hearing is good. Activities of Daily Living  The home contains: no safety equipment. Patient does total self care    Fall Risk  Fall Risk Assessment, last 12 mths 1/15/2018   Able to walk? Yes   Fall in past 12 months?  Yes   Fall with injury? Yes   Number of falls in past 12 months 1   Fall Risk Score 2       Abuse Screen  Patient is not abused    Cognitive Screening   Evaluation of Cognitive Function:  Has your family/caregiver stated any concerns about your memory: yes      Patient Care Team   Patient Care Team:  Tesfaye Hood MD as PCP - General (Family Practice)  Jeremy Desai MD (Orthopedic Surgery)  Governor Tien MD (Pulmonary Disease)  Sandro Mccarthy MD (Sleep Medicine)  [address], [address] (Psychology)  Peggy Wilkins MD (Gastroenterology)  Lisa Combs MD (Neurology)  Deep Jeong DPM (Podiatry)    Assessment/Plan   Education and counseling provided:  Are appropriate based on today's review and evaluation  End-of-Life planning (with patient's consent)- discussed, provided form  Pneumococcal Vaccine- completed 13/23  Screening Mammography- due, order placed  Colorectal cancer screening tests- 2014 update 2024  Cardiovascular screening blood test- 2/18  Bone mass measurement (DEXA)- update 2020  Screening for glaucoma- due, referral placed    Diagnoses and all orders for this visit:    1. Medicare annual wellness visit, subsequent      Health Maintenance Due   Topic Date Due    EYE EXAM RETINAL OR DILATED Q1  05/21/1958    BREAST CANCER SCRN MAMMOGRAM  05/21/1998    ZOSTER VACCINE AGE 60>  03/21/2008    GLAUCOMA SCREENING Q2Y  05/21/2013     Filipe Rayagunjan, 69 y.o.,  female    SUBJECTIVE  Ff-up    NIDDM-taking metformin and januvia. denies hypoglycemia. Has established care with podiatrist dr. Rahel Rojas. Due for eye exam.  Started on lipitor on last visit, tolerating well. OAB- reports recently started on oxybutynin and responding well to this. Chronic anemia- says past few years has developed this, on po fe.  reviewed normal EGD/colonoscopy 2014.   Reviewed iron profile more c/w ACD    GERD- on prilosec    Chronic neck pain with radiculopathy- reports following dr. Forrest Neves for this, currently on gabapentin/flexeril and norco prn. 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 Asthma-doing well she says following Dr. Rm Brown  CHRIS on CPAP- reports consistent use,  following  Dr. Masterson doing well with every few months counseling dr. Yvon Aden    She lives alone, doing ADLs independently    ROS:  See HPI, all others negative    ROS random episodes of chills, no wt loss/appetite changes, no hot flashes/night sweates/ fever    Patient Active Problem List   Diagnosis Code    Pure hypercholesterolemia E78.00    Overactive bladder N32.81    Chronic allergic rhinitis J30.9    Anemia L10.0    Uncomplicated asthma D31.525    Type 2 diabetes mellitus without complication, without long-term current use of insulin (HCC) E11.9       Current Outpatient Prescriptions   Medication Sig Dispense Refill    pantoprazole (PROTONIX) 40 mg tablet Take 1 Tab by mouth daily. 90 Tab 0    metFORMIN ER (GLUCOPHAGE XR) 500 mg tablet Take 2 Tabs by mouth daily (with dinner). 180 Tab 0    montelukast (SINGULAIR) 10 mg tablet Take 1 Tab by mouth daily. 90 Tab 2    cyclobenzaprine (FLEXERIL) 5 mg tablet Take 5 mg by mouth three (3) times daily as needed for Muscle Spasm(s).  aspirin delayed-release 81 mg tablet Take  by mouth daily.  cyanocobalamin (VITAMIN B12) 500 mcg tablet Take 500 mcg by mouth daily.  fluticasone-vilanterol (BREO ELLIPTA) 200-25 mcg/dose inhaler Take 1 Puff by inhalation daily.  atorvastatin (LIPITOR) 10 mg tablet Take 1 Tab by mouth daily. 90 Tab 0    oxybutynin (DITROPAN) 5 mg tablet Take 1 Tab by mouth two (2) times a day. 60 Tab 0    SITagliptin (JANUVIA) 50 mg tablet 50 mg.      gabapentin (NEURONTIN) 100 mg capsule 100 mg.      fluticasone (VERAMYST) 27.5 mcg/actuation nasal spray [place]  in Nose.  cycloSPORINE (RESTASIS) 0.05 % ophthalmic emulsion Instill 1 Drop in affected eye(s) Every 12 hours.       HYDROcodone-acetaminophen (NORCO) 5-325 mg per tablet   0    ipratropium-albuterol (COMBIVENT RESPIMAT)  mcg/actuation inhaler Take 1 inhalation inhaled by mouth Take As Needed.  ferrous sulfate 325 mg (65 mg iron) tablet 325 mg.      mometasone-formoterol (DULERA) 100-5 mcg/actuation HFA inhaler Take 2 Puffs by inhalation two (2) times a day. Allergies   Allergen Reactions    Iodine Swelling    Nabumetone Swelling    Penicillins Unknown (comments)    Shellfish Containing Products Swelling    Sulfa (Sulfonamide Antibiotics) Swelling       Past Medical History:   Diagnosis Date    Allergic rhinitis     Anemia     Asthma     Candida vaginitis     CKD (chronic kidney disease) stage 3, GFR 30-59 ml/min     COPD (chronic obstructive pulmonary disease) (HCC)     Diabetes (HCC)     Diabetes (HCC)     GERD (gastroesophageal reflux disease)     h pylori gastritis egd 11/14 dr Jackie Novak    Headache     Hx of colonoscopy 11/05/2014    normal rpt 10 years, 11/2024, dr. Jackie Novak    Hypercholesterolemia     Mild dementia     OAB (overactive bladder)     CHRIS on CPAP     Radiculopathy     Urinary incontinence     UTI (urinary tract infection)        Social History     Social History    Marital status:      Spouse name: N/A    Number of children: N/A    Years of education: N/A     Occupational History    Not on file. Social History Main Topics    Smoking status: Former Smoker     Years: 20.00    Smokeless tobacco: Former User     Quit date: 1/1/1999    Alcohol use Yes    Drug use: No    Sexual activity: Not Currently     Partners: Male     Other Topics Concern    Not on file     Social History Narrative       History reviewed. No pertinent family history.       OBJECTIVE    Physical Exam:     Visit Vitals    /78 (BP 1 Location: Left arm, BP Patient Position: Sitting)    Pulse 80    Temp 97.4 °F (36.3 °C) (Oral)    Resp 16    Ht 5' 1\" (1.549 m)    Wt 162 lb (73.5 kg)    SpO2 96%    BMI 30.61 kg/m2 General: alert, AA, in no apparent distress or pain  Breasts: breasts appear normal, no suspicious masses, no skin or nipple changes or axillary nodes. CVS: normal rate, regular rhythm, distinct S1 and S2  Lungs:clear to ausculation bilaterally, no crackles, wheezing or rhonchi noted  Abdomen: normoactive bowel sounds, soft, non-tender  Skin: warm, no lesions, rashes noted  Psych:  mood and affect normal    Results for orders placed or performed in visit on 02/15/18   IRON PROFILE   Result Value Ref Range    TIBC 226 (L) 250 - 450 ug/dL    UIBC 187 118 - 369 ug/dL    Iron 39 27 - 139 ug/dL    Iron % saturation 17 15 - 55 %   HEPATITIS C AB   Result Value Ref Range    Hep C Virus Ab <0.1 0.0 - 0.9 s/co ratio   FERRITIN   Result Value Ref Range    Ferritin 193 (H) 15 - 150 ng/mL         ASSESSMENT/PLAN  Diagnoses and all orders for this visit:    1. Type 2 diabetes mellitus without complication, without long-term current use of insulin (HCC)  Controlled  Cont current regimen  Eye exam- due, referral to ophtha  Foot exam- 2/18  Microalbumin 2/18  Lipid panel 2/18  a1c 2/18    2. Pure hypercholesterolemia  Andi cv risk 19.5% advised to start on statin  Cont lipitor,   Recheck  Cmp/lipid/CBC In 2 months    3. Overactive bladder  Responding to ditropan, to cont    4. Normocytic anemia  Normal GI work up 2014  Iron profile c/w anemia of chronic disease  Will hold po fe and recheck CBC in 3 months    5. Chills  TSH    BMI 30   Encouraged wt loss    Ff-up in 3 months or sooner prn    Patient understands plan of care. Patient has provided input and agrees with goals.

## 2018-04-20 NOTE — MR AVS SNAPSHOT
[facility] [address] 
[phone] Patient: Maggy Parham MRN: [id number] EAC:[id number] Visit Information Date & Time Provider Department Dept. Phone Encounter #  
 4/20/2018 11:30 AM Valerie Christy, [address] [place] [address] Road [id number] Follow-up Instructions Return in about 3 months (around 7/20/2018), or if symptoms worsen or fail to improve. Upcoming Health Maintenance Date Due  
 EYE EXAM RETINAL OR DILATED Q1 5/21/1960 BREAST CANCER SCRN MAMMOGRAM 5/21/1998 ZOSTER VACCINE AGE 60> 3/21/2008 GLAUCOMA SCREENING Q2Y 5/21/2013 HEMOGLOBIN A1C Q6M 8/12/2018 MICROALBUMIN Q1 2/12/2019 LIPID PANEL Q1 2/12/2019 FOOT EXAM Q1 2/15/2019 MEDICARE YEARLY EXAM 2/16/2019 DTaP/Tdap/Td series (2 - Td) 7/29/2024 COLONOSCOPY 11/5/2024 Allergies as of 4/20/2018  Review Complete On: 4/20/2018 By: Valerie Christy MD  
  
 Severity Noted Reaction Type Reactions Iodine  06/11/2010    Swelling Nabumetone  06/11/2010    Swelling Penicillins  06/11/2010    Unknown (comments) Shellfish Containing Products  06/11/2010    Swelling Sulfa (Sulfonamide Antibiotics)  06/11/2010    Swelling Current Immunizations  Reviewed on 2/14/2018 Name Date Pneumococcal Conjugate (PCV-13) 6/23/2016 Pneumococcal Vaccine (Unspecified Type) 10/3/2017 Tdap 7/29/2014 12:00 AM  
  
 Not reviewed this visit You Were Diagnosed With   
  
 Codes Comments Type 2 diabetes mellitus without complication, without long-term current use of insulin (HCC)    -  Primary ICD-10-CM: E11.9 ICD-9-CM: 250.00 Medicare annual wellness visit, subsequent     ICD-10-CM: Z00.00 ICD-9-CM: V70.0 Pure hypercholesterolemia     ICD-10-CM: E78.00 ICD-9-CM: 272.0 Overactive bladder     ICD-10-CM: N32.81 ICD-9-CM: 596.51 Normocytic anemia     ICD-10-CM: D64.9 ICD-9-CM: 285.9 Screening for breast cancer     ICD-10-CM: Z12.31 
ICD-9-CM: V76.10 Vitals BP Pulse Temp Resp Height(growth percentile) Weight(growth percentile) 134/78 (BP 1 Location: Left arm, BP Patient Position: Sitting) 80 97.4 °F (36.3 °C) (Oral) 16 5' 1\" (1.549 m) 162 lb (73.5 kg) SpO2 BMI OB Status Smoking Status 96% 30.61 kg/m2 Hysterectomy Former Smoker Vitals History BMI and BSA Data Body Mass Index Body Surface Area  
 30.61 kg/m 2 1.78 m 2 Preferred Pharmacy Pharmacy Name Phone [address], [address] [facility] Your Updated Medication List  
  
   
This list is accurate as of 4/20/18 12:15 PM.  Always use your most recent med list.  
  
  
  
  
 aspirin delayed-release 81 mg tablet Take  by mouth daily. atorvastatin 10 mg tablet Commonly known as:  LIPITOR Take 1 Tab by mouth daily. BREO ELLIPTA 200-25 mcg/dose inhaler Generic drug:  fluticasone-vilanterol Take 1 Puff by inhalation daily. cyanocobalamin 500 mcg tablet Commonly known as:  VITAMIN B12 Take 500 mcg by mouth daily. cyclobenzaprine 5 mg tablet Commonly known as:  FLEXERIL Take 5 mg by mouth three (3) times daily as needed for Muscle Spasm(s). cycloSPORINE 0.05 % ophthalmic emulsion Commonly known as:  RESTASIS Instill 1 Drop in affected eye(s) Every 12 hours. DULERA 100-5 mcg/actuation HFA inhaler Generic drug:  mometasone-formoterol Take 2 Puffs by inhalation two (2) times a day. ferrous sulfate 325 mg (65 mg iron) tablet 325 mg.  
  
 fluticasone 27.5 mcg/actuation nasal spray Commonly known as:  VERAMYST Spray  in Nose.  
  
 gabapentin 100 mg capsule Commonly known as:  NEURONTIN  
100 mg. HYDROcodone-acetaminophen 5-325 mg per tablet Commonly known as:  NORCO  
  
 ipratropium-albuterol  mcg/actuation inhaler Commonly known as:  Trejo Laine Take 1 inhalation inhaled by mouth Take As Needed. metFORMIN  mg tablet Commonly known as:  GLUCOPHAGE XR Take 2 Tabs by mouth daily (with dinner). montelukast 10 mg tablet Commonly known as:  SINGULAIR Take 1 Tab by mouth daily. oxybutynin 5 mg tablet Commonly known as:  IPJKSBXB Take 1 Tab by mouth two (2) times a day. pantoprazole 40 mg tablet Commonly known as:  PROTONIX Take 1 Tab by mouth daily. SITagliptin 50 mg tablet Commonly known as:  Luretha Mariner 50 mg. We Performed the Following REFERRAL TO OPHTHALMOLOGY [REF57 Custom] Follow-up Instructions Return in about 3 months (around 7/20/2018), or if symptoms worsen or fail to improve. To-Do List   
 04/20/2018 Lab:  CBC WITH AUTOMATED DIFF   
  
 04/20/2018 Lab:  LIPID PANEL   
  
 04/20/2018 Imaging:  DEENA MAMMO BI SCREENING INCL CAD   
  
 04/20/2018 Lab:  METABOLIC PANEL, COMPREHENSIVE Referral Information Referral ID Referred By Referred To  
  
 [id number] Cj Miguel MD   
   [address] [place] [address], [address], [address] Phone: [phone] Fax: [phone] Visits Status Start Date End Date 1 New Request 4/20/18 4/20/19 If your referral has a status of pending review or denied, additional information will be sent to support the outcome of this decision. Patient Instructions Medicare Wellness Visit, Female The best way to live healthy is to have a healthy lifestyle by eating a well-balanced diet, exercising regularly, limiting alcohol and stopping smoking. Regular physical exams and screening tests are another way to keep healthy. Preventive exams provided by your health care provider can find health problems before they become diseases or illnesses.  Preventive services including immunizations, screening tests, monitoring and exams can help you take care of your own health. All people over age 72 should have a pneumovax  and and a prevnar shot to prevent pneumonia. These are once in a lifetime unless you and your provider decide differently. All people over 65 should have a yearly flu shot and a tetanus vaccine every 10 years. A bone mass density to screen for osteoporosis or thinning of the bones should be done every 2 years after 65. Screening for diabetes mellitus with a blood sugar test should be done every year. Glaucoma is a disease of the eye due to increased ocular pressure that can lead to blindness and it should be done every year by an eye professional. 
 
Cardiovascular screening tests that check for elevated lipids (fatty part of blood) which can lead to heart disease and strokes should be done every 5 years. Colorectal screening that evaluates for blood or polyps in your colon should be done yearly as a stool test or every five years as a flexible sigmoidoscope or every 10 years as a colonoscopy up to age 76. Breast cancer screening with a mammogram is recommended biennially  for women age 54-69. Screening for cervical cancer with a pap smear and pelvic exam is recommended for women after age 72 years every 2 years up to age 79 or when the provider and patient decide to stop. If there is a history of cervical abnormalities or other increased risk for cancer then the test is recommended yearly. Hepatitis C screening is also recommended for anyone born between 80 through [address]. A shingles vaccine is also recommended once in a lifetime after age 61. Your Medicare Wellness Exam is recommended annually. Here is a list of your current Health Maintenance items with a due date: 
Health Maintenance Due Topic Date Due [facility] Eye Exam  05/21/1960  Breast Cancer Screening  05/21/1998  Shingles Vaccine  03/21/2008  Glaucoma Screening   05/21/2013 Introducing \[facility]\"" & HEALTH SERVICES! Neo Dixon introduces [employer] patient portal. Now you can access parts of your medical record, email your doctor's office, and request medication refills online. 1. In your internet browser, go to [email or web address] 2. Click on the First Time User? Click Here link in the Sign In box. You will see the New Member Sign Up page. 3. Enter your [employer] Access Code exactly as it appears below. You will not need to use this code after youve completed the sign-up process. If you do not sign up before the expiration date, you must request a new code. · [employer] Access Code: [email or web address] Expires: 7/19/2018 12:15 PM 
 
4. Enter the last four digits of your Social Security Number (xxxx) and Date of Birth (mm/dd/yyyy) as indicated and click Submit. You will be taken to the next sign-up page. 5. Create a Fontself ID. This will be your Fontself login ID and cannot be changed, so think of one that is secure and easy to remember. 6. Create a [employer] password. You can change your password at any time. 7. Enter your Password Reset Question and Answer. This can be used at a later time if you forget your password. 8. Enter your e-mail address. You will receive e-mail notification when new information is available in [address] 9. Click Sign Up. You can now view and download portions of your medical record. 10. Click the Download Summary menu link to download a portable copy of your medical information. If you have questions, please visit the Frequently Asked Questions section of the [employer] website. Remember, [employer] is NOT to be used for urgent needs. For medical emergencies, dial 911. Now available from your iPhone and Android! Please provide this summary of care documentation to your next provider.  
  
  
 Your primary care clinician is listed as Pako Nolasco. If you have any questions after today's visit, please call [phone].

## 2018-04-20 NOTE — PATIENT INSTRUCTIONS

## 2018-04-23 ENCOUNTER — HOSPITAL ENCOUNTER (OUTPATIENT)
Dept: MAMMOGRAPHY | Age: 68
Discharge: HOME | End: 2018-04-23
Attending: FAMILY MEDICINE
Payer: MEDICARE

## 2018-04-23 DIAGNOSIS — Z12.39 SCREENING FOR BREAST CANCER: ICD-10-CM

## 2018-04-23 PROCEDURE — 77063 BREAST TOMOSYNTHESIS BI: CPT

## 2018-04-23 NOTE — ACP (ADVANCE CARE PLANNING)
Advance Care Planning (ACP) Provider Note - Comprehensive     Date of ACP Conversation: 04/23/18  Persons included in Conversation:  patient  Length of ACP Conversation in minutes:  16 minutes    Authorized Decision Maker (if patient is incapable of making informed decisions): This person is:  Has yet to decide     General ACP for ALL Patients with Decision Making Capacity:   Importance of advance care planning, including choosing a healthcare agent to communicate patient's healthcare decisions if patient lost the ability to make decisions, such as after a sudden illness or accident  Understanding of the healthcare agent role was assessed and information provided  Exploration of values, goals, and preferences if recovery is not expected, even with continued medical treatment in the event of: Imminent death  Severe, permanent brain injury      For Serious or Chronic Illness:  Understanding of medical condition    Understanding of CPR, goals and expected outcomes, benefits and burdens discussed. Information on CPR success rates provided (e.g. for CPR in hospital, survival to d/c at two weeks is 22%, for chronically ill or elderly/frail survival is less than 3%); Individual asked to communicate understanding of information in his/her own words.     Interventions Provided:  Recommended completion of Advance Directive form after review of ACP materials and conversation with prospective healthcare agent   Recommended communicating the plan and making copies for the healthcare agent, personal physician, and others as appropriate (e.g., health system)  Recommended review of completed ACP document annually or upon change in health status

## 2018-05-16 ENCOUNTER — TELEPHONE (OUTPATIENT)
Dept: FAMILY MEDICINE CLINIC | Age: 68
End: 2018-05-16

## 2018-05-16 NOTE — TELEPHONE ENCOUNTER
Pt called and would like to follow up on her new podiatrist appt for a second opinion  Please call pt at your earliest convenience.

## 2018-05-17 NOTE — TELEPHONE ENCOUNTER
Patient identified with 2 identifiers (name and ). Patient came by office and is aware we are going to send her to [facility] for second opinion.

## 2018-05-22 ENCOUNTER — TELEPHONE (OUTPATIENT)
Dept: FAMILY MEDICINE CLINIC | Age: 68
End: 2018-05-22

## 2018-05-22 NOTE — TELEPHONE ENCOUNTER
Pt called and stated she would like to be referred to another podiatrist due to the practice [facility] she been there before and doesn't like it. Please call pt at your earliest convenience.

## 2018-05-22 NOTE — TELEPHONE ENCOUNTER
Patient identified with 2 identifiers (name and ). Advised patient to call her insurance for a list of podiatrist in the area who takes her insurance. Patient verbalizes understanding.

## 2018-06-04 ENCOUNTER — TELEPHONE (OUTPATIENT)
Dept: FAMILY MEDICINE CLINIC | Age: 68
End: 2018-06-04

## 2018-06-04 NOTE — TELEPHONE ENCOUNTER
Pt found the [facility] here in the [facility] and she has a f/u appt with them 9/10/2018. She states that she net the DR and enjoyed the visit. She just wanted to let Dr John Lees know that she had an appt.

## 2018-06-13 ENCOUNTER — HOSPITAL ENCOUNTER (OUTPATIENT)
Dept: GENERAL RADIOLOGY | Age: 68
Discharge: HOME | End: 2018-06-13
Payer: MEDICARE

## 2018-06-13 ENCOUNTER — HOSPITAL ENCOUNTER (OUTPATIENT)
Dept: LAB | Age: 68
Discharge: HOME | End: 2018-06-13
Payer: MEDICARE

## 2018-06-13 DIAGNOSIS — R68.83 CHILLS: ICD-10-CM

## 2018-06-13 DIAGNOSIS — E78.00 PURE HYPERCHOLESTEROLEMIA: ICD-10-CM

## 2018-06-13 DIAGNOSIS — E11.9 TYPE 2 DIABETES MELLITUS WITHOUT COMPLICATION, WITHOUT LONG-TERM CURRENT USE OF INSULIN (HCC): ICD-10-CM

## 2018-06-13 DIAGNOSIS — D64.9 NORMOCYTIC ANEMIA: ICD-10-CM

## 2018-06-13 LAB
ALBUMIN SERPL-MCNC: 3.8 G/DL (ref 3.4–5)
ALBUMIN/GLOB SERPL: 1.2 {RATIO} (ref 0.8–1.7)
ALP SERPL-CCNC: 76 U/L (ref 45–117)
ALT SERPL-CCNC: 16 U/L (ref 13–56)
ANION GAP SERPL CALC-SCNC: 7 MMOL/L (ref 3–18)
AST SERPL-CCNC: 15 U/L (ref 15–37)
BASOPHILS # BLD: 0 K/UL (ref 0–0.1)
BASOPHILS NFR BLD: 0 % (ref 0–2)
BILIRUB SERPL-MCNC: 0.3 MG/DL (ref 0.2–1)
BUN SERPL-MCNC: 16 MG/DL (ref 7–18)
BUN/CREAT SERPL: 12 (ref 12–20)
CALCIUM SERPL-MCNC: 9.2 MG/DL (ref 8.5–10.1)
CHLORIDE SERPL-SCNC: 108 MMOL/L (ref 100–108)
CHOLEST SERPL-MCNC: 201 MG/DL
CO2 SERPL-SCNC: 29 MMOL/L (ref 21–32)
CREAT SERPL-MCNC: 1.38 MG/DL (ref 0.6–1.3)
DIFFERENTIAL METHOD BLD: ABNORMAL
EOSINOPHIL # BLD: 0.2 K/UL (ref 0–0.4)
EOSINOPHIL NFR BLD: 5 % (ref 0–5)
ERYTHROCYTE [DISTWIDTH] IN BLOOD BY AUTOMATED COUNT: 15.7 % (ref 11.6–14.5)
GLOBULIN SER CALC-MCNC: 3.3 G/DL (ref 2–4)
GLUCOSE SERPL-MCNC: 88 MG/DL (ref 74–99)
HCT VFR BLD AUTO: 31.6 % (ref 35–45)
HDLC SERPL-MCNC: 82 MG/DL (ref 40–60)
HDLC SERPL: 2.5 {RATIO} (ref 0–5)
HGB BLD-MCNC: 10.7 G/DL (ref 12–16)
LDLC SERPL CALC-MCNC: 104 MG/DL (ref 0–100)
LIPID PROFILE,FLP: ABNORMAL
LYMPHOCYTES # BLD: 1.7 K/UL (ref 0.9–3.6)
LYMPHOCYTES NFR BLD: 39 % (ref 21–52)
MCH RBC QN AUTO: 27.6 PG (ref 24–34)
MCHC RBC AUTO-ENTMCNC: 33.9 G/DL (ref 31–37)
MCV RBC AUTO: 81.4 FL (ref 74–97)
MONOCYTES # BLD: 0.3 K/UL (ref 0.05–1.2)
MONOCYTES NFR BLD: 6 % (ref 3–10)
NEUTS SEG # BLD: 2.3 K/UL (ref 1.8–8)
NEUTS SEG NFR BLD: 50 % (ref 40–73)
PLATELET # BLD AUTO: 227 K/UL (ref 135–420)
PMV BLD AUTO: 9.3 FL (ref 9.2–11.8)
POTASSIUM SERPL-SCNC: 4 MMOL/L (ref 3.5–5.5)
PROT SERPL-MCNC: 7.1 G/DL (ref 6.4–8.2)
RBC # BLD AUTO: 3.88 M/UL (ref 4.2–5.3)
SODIUM SERPL-SCNC: 144 MMOL/L (ref 136–145)
TRIGL SERPL-MCNC: 75 MG/DL (ref ?–150)
TSH SERPL DL<=0.05 MIU/L-ACNC: 2.01 UIU/ML (ref 0.36–3.74)
VLDLC SERPL CALC-MCNC: 15 MG/DL
WBC # BLD AUTO: 4.5 K/UL (ref 4.6–13.2)

## 2018-06-13 PROCEDURE — 85025 COMPLETE CBC W/AUTO DIFF WBC: CPT | Performed by: FAMILY MEDICINE

## 2018-06-13 PROCEDURE — 84443 ASSAY THYROID STIM HORMONE: CPT | Performed by: FAMILY MEDICINE

## 2018-06-13 PROCEDURE — 36415 COLL VENOUS BLD VENIPUNCTURE: CPT | Performed by: FAMILY MEDICINE

## 2018-06-13 PROCEDURE — 71046 X-RAY EXAM CHEST 2 VIEWS: CPT

## 2018-06-13 PROCEDURE — 80053 COMPREHEN METABOLIC PANEL: CPT | Performed by: FAMILY MEDICINE

## 2018-06-13 PROCEDURE — 80061 LIPID PANEL: CPT | Performed by: FAMILY MEDICINE

## 2018-06-18 ENCOUNTER — HOSPITAL ENCOUNTER (OUTPATIENT)
Dept: GENERAL RADIOLOGY | Age: 68
Discharge: HOME | End: 2018-06-18
Payer: MEDICARE

## 2018-06-18 ENCOUNTER — OFFICE VISIT (OUTPATIENT)
Dept: FAMILY MEDICINE CLINIC | Age: 68
End: 2018-06-18

## 2018-06-18 VITALS
SYSTOLIC BLOOD PRESSURE: 128 MMHG | RESPIRATION RATE: 16 BRPM | WEIGHT: 158 LBS | DIASTOLIC BLOOD PRESSURE: 86 MMHG | TEMPERATURE: 97.6 F | HEART RATE: 62 BPM | OXYGEN SATURATION: 99 % | BODY MASS INDEX: 29.83 KG/M2 | HEIGHT: 61 IN

## 2018-06-18 DIAGNOSIS — M25.552 LEFT HIP PAIN: ICD-10-CM

## 2018-06-18 DIAGNOSIS — E78.00 PURE HYPERCHOLESTEROLEMIA: ICD-10-CM

## 2018-06-18 DIAGNOSIS — M25.562 ACUTE PAIN OF LEFT KNEE: ICD-10-CM

## 2018-06-18 DIAGNOSIS — N32.81 OVERACTIVE BLADDER: ICD-10-CM

## 2018-06-18 DIAGNOSIS — E11.9 TYPE 2 DIABETES MELLITUS WITHOUT COMPLICATION, WITHOUT LONG-TERM CURRENT USE OF INSULIN (HCC): Primary | ICD-10-CM

## 2018-06-18 DIAGNOSIS — F43.23 ADJUSTMENT DISORDER WITH MIXED ANXIETY AND DEPRESSED MOOD: ICD-10-CM

## 2018-06-18 DIAGNOSIS — J30.9 CHRONIC ALLERGIC RHINITIS: ICD-10-CM

## 2018-06-18 DIAGNOSIS — R41.3 MEMORY DIFFICULTY: ICD-10-CM

## 2018-06-18 DIAGNOSIS — D63.8 ANEMIA OF CHRONIC DISEASE: ICD-10-CM

## 2018-06-18 PROCEDURE — 73502 X-RAY EXAM HIP UNI 2-3 VIEWS: CPT

## 2018-06-18 PROCEDURE — 73564 X-RAY EXAM KNEE 4 OR MORE: CPT

## 2018-06-18 RX ORDER — PREDNISOLONE ACETATE 10 MG/ML
1 SUSPENSION/ DROPS OPHTHALMIC 4 TIMES DAILY
COMMUNITY
End: 2020-06-01

## 2018-06-18 RX ORDER — CYANOCOBALAMIN (VITAMIN B-12) 500 MCG
TABLET ORAL DAILY
COMMUNITY
End: 2019-08-19

## 2018-06-18 RX ORDER — SERTRALINE HYDROCHLORIDE 50 MG/1
50 TABLET, FILM COATED ORAL DAILY
Qty: 90 TAB | Refills: 0 | Status: SHIPPED | OUTPATIENT
Start: 2018-06-18 | End: 2018-07-05

## 2018-06-18 NOTE — PATIENT INSTRUCTIONS
Adjustment Disorder: Care Instructions  Your Care Instructions    Adjustment disorder means that you have emotional or behavioral problems because of stress. But your response to the stress is far more severe than a normal response. It is severe enough to affect your work or social life and may cause depression and physical pains and problems. Events that may cause this response can include a divorce, money problems, or starting school or a new job. It might be anything that causes some stress. This disorder is most often a short-term problem. It happens within 3 months of the stressful event or change. If the response lasts longer than 6 months after the event ends, you may have a more serious disorder. Follow-up care is a key part of your treatment and safety. Be sure to make and go to all appointments, and call your doctor if you are having problems. It's also a good idea to know your test results and keep a list of the medicines you take. How can you care for yourself at home? · Go to all counseling sessions. Do not skip any because you are feeling better. · If your doctor prescribed medicines, take them exactly as prescribed. Call your doctor if you think you are having a problem with your medicine. You will get more details on the specific medicines your doctor prescribes. · Discuss the causes of your stress with a good friend or family member. Or you can join a support group for people with similar problems. Talking to others sometimes relieves stress. · Get at least 30 minutes of exercise on most days of the week. Walking is a good choice. You also may want to do other activities, such as running, swimming, cycling, or playing tennis or team sports. Relaxation techniques  Do relaxation exercises 10 to 20 minutes a day. You can play soothing, relaxing music while you do them, if you wish. · Tell others in your house that you are going to do your relaxation exercises.  Ask them not to disturb you.  · Find a comfortable, quiet place. · Lie down on your back, or sit with your back straight. · Focus on your breathing. Make it slow and steady. · Breathe in through your nose. Breathe out through either your nose or mouth. · Breathe deeply, filling up the area between your navel and your rib cage. Breathe so that your belly goes up and down. · Do not hold your breath. · Breathe like this for 5 to 10 minutes. Notice the feeling of calmness throughout your whole body. As you continue to breathe slowly and deeply, relax by doing these next steps for another 5 to 10 minutes:  · Tighten and relax each muscle group in your body. Start at your toes, and work your way up to your head. · Imagine your muscle groups relaxing and getting heavy. · Empty your mind of all thoughts. · Let yourself relax more and more deeply. · Be aware of the state of calmness that surrounds you. · When your relaxation time is over, you can bring yourself back to alertness by moving your fingers and toes. Then move your hands and feet. And then move your entire body. Sometimes people fall asleep during relaxation. But they most often wake up soon. · Always give yourself time to return to full alertness before you drive a car. Wait to do anything that might cause an accident if you are not fully alert. Never play a relaxation tape while you drive a car. When should you call for help? Call 911 anytime you think you may need emergency care. For example, call if:  ? · You feel you cannot stop from hurting yourself or someone else. Keep the numbers for these national suicide hotlines: 9-095-773-TALK (7-212-823-591.255.3061) and 0-525-RTRVNKO (6-452.541.8095). If you or someone you know talks about suicide or feeling hopeless, get help right away. ? Watch closely for changes in your health, and be sure to contact your doctor if:  ? · You have new anxiety, or your anxiety gets worse.    ? · You have been feeling sad, depressed, or hopeless or have lost interest in things that you usually enjoy. ? · You do not get better as expected. Where can you learn more? Go to http://danika-sophia.info/. Enter 0688 [id number] in the search box to learn more about \"Adjustment Disorder: Care Instructions. \"  Current as of: July 26, 2016  Content Version: 11.4  © 1044-1102 [employer]. Care instructions adapted under license by [employer] (which disclaims liability or warranty for this information). If you have questions about a medical condition or this instruction, always ask your healthcare professional. [place] [address] any warranty or liability for your use of this information.

## 2018-06-18 NOTE — MR AVS SNAPSHOT
[address] [facility] [address] 200 [facility] 
[phone] Patient: Darion Verduzco MRN: [id number]:[id number] Visit Information Date & Time Provider Department Dept. Phone Encounter #  
 6/18/2018 10:00 AM Lulla Leyden, [address] [id number] Follow-up Instructions Return in about 2 weeks (around 7/2/2018), or if symptoms worsen or fail to improve. Your Appointments 7/20/2018 10:00 AM  
FOLLOW UP EXAM with Lulla Leyden, MD  
[facility] ([facility]) Appt Note: Routine follow up [address] [facility] 250 200 [facility]  
[address] [facility] 200 [facility] Upcoming Health Maintenance Date Due ZOSTER VACCINE AGE 60> 3/21/2008 Influenza Age 5 to Adult 8/1/2018 HEMOGLOBIN A1C Q6M 8/12/2018 MICROALBUMIN Q1 2/12/2019 FOOT EXAM Q1 4/10/2019 EYE EXAM RETINAL OR DILATED Q1 5/21/2019 LIPID PANEL Q1 6/13/2019 BREAST CANCER SCRN MAMMOGRAM 4/23/2020 GLAUCOMA SCREENING Q2Y 5/21/2020 DTaP/Tdap/Td series (2 - Td) 7/29/2024 COLONOSCOPY 11/5/2024 Allergies as of 6/18/2018  Review Complete On: 6/18/2018 By: Lulla Leyden, MD  
  
 Severity Noted Reaction Type Reactions Iodine  06/11/2010    Swelling Nabumetone  06/11/2010    Swelling Penicillins  06/11/2010    Unknown (comments) Shellfish Containing Products  06/11/2010    Swelling Sulfa (Sulfonamide Antibiotics)  06/11/2010    Swelling Current Immunizations  Reviewed on 2/14/2018 Name Date Pneumococcal Conjugate (PCV-13) 6/23/2016 Pneumococcal Vaccine (Unspecified Type) 10/3/2017 Tdap 7/29/2014 12:00 AM  
  
 Not reviewed this visit You Were Diagnosed With   
  
 Codes Comments  Type 2 diabetes mellitus without complication, without long-term current use of insulin ([facility]    -  Primary ICD-10-CM: E11.9 ICD-9-CM: 250.00 Anemia of chronic disease     ICD-10-CM: D63.8 ICD-9-CM: 285.29 Pure hypercholesterolemia     ICD-10-CM: E78.00 ICD-9-CM: 272.0 Overactive bladder     ICD-10-CM: N32.81 ICD-9-CM: 596.51 Chronic allergic rhinitis     ICD-10-CM: J30.9 ICD-9-CM: 477.9 Adjustment disorder with mixed anxiety and depressed mood     ICD-10-CM: F43.23 
ICD-9-CM: 309.28 Memory difficulty     ICD-10-CM: R41.3 ICD-9-CM: 780.93 Acute pain of left knee     ICD-10-CM: M25.562 ICD-9-CM: 719.46 Left hip pain     ICD-10-CM: M25.552 ICD-9-CM: 719.45 Vitals BP Pulse Temp Resp Height(growth percentile) Weight(growth percentile) 128/86 (BP 1 Location: Left arm, BP Patient Position: Sitting) 62 97.6 °F (36.4 °C) (Oral) 16 5' 1\" (1.549 m) 158 lb (71.7 kg) SpO2 BMI OB Status Smoking Status 99% 29.85 kg/m2 Hysterectomy Former Smoker Vitals History BMI and BSA Data Body Mass Index Body Surface Area  
 29.85 kg/m 2 1.76 m 2 Preferred Pharmacy Pharmacy Name Phone [address] [facility] [address] [facility], [address] [facility] Your Updated Medication List  
  
   
This list is accurate as of 6/18/18 10:59 AM.  Always use your most recent med list.  
  
  
  
  
 aspirin delayed-release 81 mg tablet Take  by mouth daily. atorvastatin 10 mg tablet Commonly known as:  LIPITOR Take 1 Tab by mouth daily. BREO ELLIPTA 200-25 mcg/dose inhaler Generic drug:  fluticasone-vilanterol Take 1 Puff by inhalation daily. cholecalciferol 400 unit Tab tablet Commonly known as:  VITAMIN D3 Take  by mouth daily. cyanocobalamin 500 mcg tablet Commonly known as:  VITAMIN B12 Take 500 mcg by mouth daily. cyclobenzaprine 5 mg tablet Commonly known as:  FLEXERIL Take 5 mg by mouth three (3) times daily as needed for Muscle Spasm(s). cycloSPORINE 0.05 % ophthalmic emulsion Commonly known as:  RESTASIS Instill 1 Drop in affected eye(s) Every 12 hours. docusate sodium 50 mg capsule Commonly known as:  Fish Ramirez Take 50 mg by mouth two (2) times a day. fluticasone 27.5 mcg/actuation nasal spray Commonly known as:  VERAMYST Spray  in Nose. fluticasone 50 mcg/actuation inhaler Commonly known as:  FLOVENT DISKUS Take  by inhalation. gabapentin 100 mg capsule Commonly known as:  NEURONTIN  
100 mg two (2) times a day. HYDROcodone-acetaminophen 5-325 mg per tablet Commonly known as:  NORCO  
  
 ipratropium-albuterol  mcg/actuation inhaler Commonly known as:  Amberly Perone Take 1 inhalation inhaled by mouth Take As Needed. metFORMIN  mg tablet Commonly known as:  GLUCOPHAGE XR Take 2 Tabs by mouth daily (with dinner). MIGRELIEF PO Take  by mouth.  
  
 montelukast 10 mg tablet Commonly known as:  SINGULAIR Take 1 Tab by mouth daily. oxybutynin 5 mg tablet Commonly known as:  KXFMKEJF Take 1 Tab by mouth two (2) times a day. pantoprazole 40 mg tablet Commonly known as:  PROTONIX Take 1 Tab by mouth daily. prednisoLONE acetate 1 % ophthalmic suspension Commonly known as:  PRED FORTE Administer 1 Drop to both eyes four (4) times daily. sertraline 50 mg tablet Commonly known as:  ZOLOFT Take 1 Tab by mouth daily. SITagliptin 50 mg tablet Commonly known as:  Rissa [place] 50 mg.  
  
 sodium chloride 0.65 % nasal squeeze bottle Commonly known as:  OCEAN  
1 Spray as needed for Congestion. Prescriptions Sent to Pharmacy Refills  
 sertraline (ZOLOFT) 50 mg tablet 0 Sig: Take 1 Tab by mouth daily. Class: Normal  
 Pharmacy: [facility] [address] JODIE ROSENTHAL [address] Ph #: [phone] Route: Oral  
  
We Performed the Following REFERRAL TO NEUROLOGY [[facility]23 Custom] Follow-up Instructions Return in about 2 weeks (around 7/2/2018), or if symptoms worsen or fail to improve. To-Do List   
 06/18/2018 Imaging:  XR HIP LT W OR WO PELV 2-3 VWS   
  
 06/18/2018 Imaging:  XR KNEE LT MIN 4 V Referral Information Referral ID Referred By Referred To  
  
 [id number] Jose Antonio Lo MD   
   [address]mie Olivarez [address] Phone: [phone] Fax: [phone] Visits Status Start Date End Date 1 New Request 6/18/18 6/18/19 If your referral has a status of pending review or denied, additional information will be sent to support the outcome of this decision. Patient Instructions Adjustment Disorder: Care Instructions Your Care Instructions Adjustment disorder means that you have emotional or behavioral problems because of stress. But your response to the stress is far more severe than a normal response. It is severe enough to affect your work or social life and may cause depression and physical pains and problems. Events that may cause this response can include a divorce, money problems, or starting school or a new job. It might be anything that causes some stress. This disorder is most often a short-term problem. It happens within 3 months of the stressful event or change. If the response lasts longer than 6 months after the event ends, you may have a more serious disorder. Follow-up care is a key part of your treatment and safety. Be sure to make and go to all appointments, and call your doctor if you are having problems. It's also a good idea to know your test results and keep a list of the medicines you take. How can you care for yourself at home? · Go to all counseling sessions. Do not skip any because you are feeling better. · If your doctor prescribed medicines, take them exactly as prescribed. Call your doctor if you think you are having a problem with your medicine. You will get more details on the specific medicines your doctor prescribes. · Discuss the causes of your stress with a good friend or family member. Or you can join a support group for people with similar problems. Talking to others sometimes relieves stress. · Get at least 30 minutes of exercise on most days of the week. Walking is a good choice. You also may want to do other activities, such as running, swimming, cycling, or playing tennis or team sports. Relaxation techniques Do relaxation exercises 10 to 20 minutes a day. You can play soothing, relaxing music while you do them, if you wish. · Tell others in your house that you are going to do your relaxation exercises. Ask them not to disturb you. · Find a comfortable, quiet place. · Lie down on your back, or sit with your back straight. · Focus on your breathing. Make it slow and steady. · Breathe in through your nose. Breathe out through either your nose or mouth. · Breathe deeply, filling up the area between your navel and your rib cage. Breathe so that your belly goes up and down. · Do not hold your breath. · Breathe like this for 5 to 10 minutes. Notice the feeling of calmness throughout your whole body. As you continue to breathe slowly and deeply, relax by doing these next steps for another 5 to 10 minutes: · Tighten and relax each muscle group in your body. Start at your toes, and work your way up to your head. · Imagine your muscle groups relaxing and getting heavy. · Empty your mind of all thoughts. · Let yourself relax more and more deeply. · Be aware of the state of calmness that surrounds you. · When your relaxation time is over, you can bring yourself back to alertness by moving your fingers and toes. Then move your hands and feet. And then move your entire body. Sometimes people fall asleep during relaxation. But they most often wake up soon. · Always give yourself time to return to full alertness before you drive a car. Wait to do anything that might cause an accident if you are not fully alert. Never play a relaxation tape while you drive a car. When should you call for help? Call 911 anytime you think you may need emergency care. For example, call if: 
? · You feel you cannot stop from hurting yourself or someone else. Keep the numbers for these national suicide hotlines: 8-111-609-TALK (9-768.333.8242) and 7-641-TIBMMLZ (8-102.213.6343). If you or someone you know talks about suicide or feeling hopeless, get help right away. ? Watch closely for changes in your health, and be sure to contact your doctor if: 
? · You have new anxiety, or your anxiety gets worse. ? · You have been feeling sad, depressed, or hopeless or have lost interest in things that you usually enjoy. ? · You do not get better as expected. Where can you learn more? Go to http://danika-sophia.info/. Enter [id number] in the search box to learn more about \"Adjustment Disorder: Care Instructions. \" Current as of: July 26, 2016 Content Version: 11.4 © 2670-1705 [employer]. Care instructions adapted under license by [employer] (which disclaims liability or warranty for this information). If you have questions about a medical condition or this instruction, always ask your healthcare professional. [place] [address] any warranty or liability for your use of this information. Introducing [facility]! Lonnie Madison introduces [employer] patient portal. Now you can access parts of your medical record, email your doctor's office, and request medication refills online. 1. In your internet browser, go to [email or web address] 2. Click on the First Time User? Click Here link in the Sign In box. You will see the New Member Sign Up page. 3. Enter your [employer] Access Code exactly as it appears below. You will not need to use this code after youve completed the sign-up process. If you do not sign up before the expiration date, you must request a new code. · [employer] Access Code: [email or web address] Expires: 7/19/2018 12:15 PM 
 
4. Enter the last four digits of your Social Security Number (xxxx) and Date of Birth (mm/dd/yyyy) as indicated and click Submit. You will be taken to the next sign-up page. 5. Create a [employer] ID. This will be your [employer] login ID and cannot be changed, so think of one that is secure and easy to remember. 6. Create a [employer] password. You can change your password at any time. 7. Enter your Password Reset Question and Answer. This can be used at a later time if you forget your password. 8. Enter your e-mail address. You will receive e-mail notification when new information is available in [address] 9. Click Sign Up. You can now view and download portions of your medical record. 10. Click the Download Summary menu link to download a portable copy of your medical information. If you have questions, please visit the Frequently Asked Questions section of the [employer] website. Remember, [employer] is NOT to be used for urgent needs. For medical emergencies, dial 911. Now available from your iPhone and Android! Please provide this summary of care documentation to your next provider. Your primary care clinician is listed as Oneita Clas. If you have any questions after today's visit, please call [phone].

## 2018-06-18 NOTE — PROGRESS NOTES
Danie Watson, 69 y.o.,  female    SUBJECTIVE  Ff-up    NIDDM-taking metformin and [place]. denies hypoglycemia. HL- taking lipitor    Depression- worse the past month, with associated anxiety. Reports  is very ill, and she has difficulty coming to terms with this. She reports to be on zoloft years ago with good response. She continues to see counselor every few months dr. Syeda Castanon. She denies harmful thoughts. She also has noticed increasing forgetfulness past month. Difficulty remembering names and events. She is able to do most ADLs, continues to drive. Has not gotten lost or wandering. Reports Left hip and knee pain after falling rushing to go the bathroom. Says fell forward and landed on left side. OAB- reports recently started on oxybutynin and responding well to this. Chronic anemia- says past few years has developed this, on po fe. Denies fatigue, pica, melena. normal EGD/colonoscopy 2014. Previous iron profile more c/w ACD. Trial off po fe for the past few months with stable hgb level. Reviewed labs. GERD- on prilosec    Chronic neck pain with radiculopathy- reports following dr. Arnaldo Lopez for this, currently on gabapentin/flexeril and norco prn. 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      Asthma-doing well she says, on ICS and LAMA for maintenance,  following Dr. Cathy Villegas  CHRIS on CPAP- reports consistent use,  following  Dr. Eduardo Breath    She lives alone, doing ADLs independently    ROS:  See HPI, all others negative    ROS random episodes of chills, no wt loss/appetite changes, no hot flashes/night sweates/ fever    Patient Active Problem List   Diagnosis Code    Pure hypercholesterolemia E78.00    Overactive bladder N32.81    Chronic allergic rhinitis J30.9    Anemia Y32.1    Uncomplicated asthma N42.973    Type 2 diabetes mellitus without complication, without long-term current use of insulin ([facility] [address] E11.9       Current Outpatient Prescriptions   Medication Sig Dispense Refill    pantoprazole (PROTONIX) 40 mg tablet Take 1 Tab by mouth daily. 90 Tab 0    metFORMIN ER (GLUCOPHAGE XR) 500 mg tablet Take 2 Tabs by mouth daily (with dinner). 180 Tab 0    montelukast (SINGULAIR) 10 mg tablet Take 1 Tab by mouth daily. 90 Tab 2    cyclobenzaprine (FLEXERIL) 5 mg tablet Take 5 mg by mouth three (3) times daily as needed for Muscle Spasm(s).  aspirin delayed-release 81 mg tablet Take  by mouth daily.  cyanocobalamin (VITAMIN B12) 500 mcg tablet Take 500 mcg by mouth daily.  fluticasone-vilanterol (BREO ELLIPTA) 200-25 mcg/dose inhaler Take 1 Puff by inhalation daily.  atorvastatin (LIPITOR) 10 mg tablet Take 1 Tab by mouth daily. 90 Tab 0    oxybutynin (DITROPAN) 5 mg tablet Take 1 Tab by mouth two (2) times a day. 60 Tab 0    SITagliptin (JANUVIA) 50 mg tablet 50 mg.      gabapentin (NEURONTIN) 100 mg capsule 100 mg.      fluticasone (VERAMYST) 27.5 mcg/actuation nasal spray [place]  in Nose.  cycloSPORINE (RESTASIS) 0.05 % ophthalmic emulsion Instill 1 Drop in affected eye(s) Every 12 hours.  HYDROcodone-acetaminophen (NORCO) 5-325 mg per tablet   0    ipratropium-albuterol (COMBIVENT RESPIMAT)  mcg/actuation inhaler Take 1 inhalation inhaled by mouth Take As Needed.  ferrous sulfate 325 mg (65 mg iron) tablet 325 mg.      mometasone-formoterol (DULERA) 100-5 mcg/actuation HFA inhaler Take 2 Puffs by inhalation two (2) times a day.          Allergies   Allergen Reactions    Iodine Swelling    Nabumetone Swelling    Penicillins Unknown (comments)    Shellfish Containing Products Swelling    Sulfa (Sulfonamide Antibiotics) Swelling       Past Medical History:   Diagnosis Date    Allergic rhinitis     Anemia     Asthma     Candida vaginitis     CKD (chronic kidney disease) stage 3, GFR 30-59 ml/min     COPD (chronic obstructive pulmonary disease) (HCC)     Diabetes (HCC)     Diabetes (HCC)     GERD (gastroesophageal reflux disease)     h pylori gastritis egd 11/14 dr Chio Valencia    Headache     Hx of colonoscopy 11/05/2014    normal rpt 10 years, 11/2024, dr. Chio Valencia    Hypercholesterolemia     Mild dementia     OAB (overactive bladder)     CHRIS on CPAP     Radiculopathy     Urinary incontinence     UTI (urinary tract infection)        Social History     Social History    Marital status:      Spouse name: N/A    Number of children: N/A    Years of education: N/A     Occupational History    Not on file. Social History Main Topics    Smoking status: Former Smoker     Years: 20.00    Smokeless tobacco: Former User     Quit date: 1/1/1999    Alcohol use Yes    Drug use: No    Sexual activity: Not Currently     Partners: Male     Other Topics Concern    Not on file     Social History Narrative       History reviewed. No pertinent family history. OBJECTIVE    Physical Exam:     Visit Vitals    /78 (BP 1 Location: Left arm, BP Patient Position: Sitting)    Pulse 80    Temp 97.4 °F (36.3 °C) (Oral)    Resp 16    Ht 5' 1\" (1.549 m)    Wt 162 lb (73.5 kg)    SpO2 96%    BMI 30.61 kg/m2       General: alert, AA, in no apparent distress or pain  CVS: normal rate, regular rhythm, distinct S1 and S2  Lungs:clear to ausculation bilaterally, no crackles, wheezing or rhonchi noted  Abdomen: normoactive bowel sounds, soft, non-tender  Extremities: mild abrasion Left knee, no effusion, FROM.  Left hip no tenderness  Skin: warm, no lesions, rashes noted  Psych:  mood and affect normal    Results for orders placed or performed during the hospital encounter of 45/26/91   METABOLIC PANEL, COMPREHENSIVE   Result Value Ref Range    Sodium 144 136 - 145 mmol/L    Potassium 4.0 3.5 - 5.5 mmol/L    Chloride 108 100 - 108 mmol/L    CO2 29 21 - 32 mmol/L    Anion gap 7 3.0 - 18 mmol/L    Glucose 88 74 - 99 mg/dL    BUN 16 7.0 - 18 MG/DL    Creatinine 1.38 (H) 0.6 - 1.3 MG/DL    BUN/Creatinine ratio 12 12 - 20 GFR est AA 46 (L) >60 ml/min/1.73m2    GFR est non-AA 38 (L) >60 ml/min/1.73m2    Calcium 9.2 8.5 - 10.1 MG/DL    Bilirubin, total 0.3 0.2 - 1.0 MG/DL    ALT (SGPT) 16 13 - 56 U/L    AST (SGOT) 15 15 - 37 U/L    Alk. phosphatase 76 45 - 117 U/L    Protein, total 7.1 6.4 - 8.2 g/dL    Albumin 3.8 3.4 - 5.0 g/dL    Globulin 3.3 2.0 - 4.0 g/dL    A-G Ratio 1.2 0.8 - 1.7     LIPID PANEL   Result Value Ref Range    LIPID PROFILE          Cholesterol, total 201 (H) <200 MG/DL    Triglyceride 75 <150 MG/DL    HDL Cholesterol 82 (H) 40 - 60 MG/DL    LDL, calculated 104 (H) 0 - 100 MG/DL    VLDL, calculated 15 MG/DL    CHOL/HDL Ratio 2.5 0 - 5.0     CBC WITH AUTOMATED DIFF   Result Value Ref Range    WBC 4.5 (L) 4.6 - 13.2 K/uL    RBC 3.88 (L) 4.20 - 5.30 M/uL    HGB 10.7 (L) 12.0 - 16.0 g/dL    HCT 31.6 (L) 35.0 - 45.0 %    MCV 81.4 74.0 - 97.0 FL    MCH 27.6 24.0 - 34.0 PG    MCHC 33.9 31.0 - 37.0 g/dL    RDW 15.7 (H) 11.6 - 14.5 %    PLATELET 884 338 - 040 K/uL    MPV 9.3 9.2 - 11.8 FL    NEUTROPHILS 50 40 - 73 %    LYMPHOCYTES 39 21 - 52 %    MONOCYTES 6 3 - 10 %    EOSINOPHILS 5 0 - 5 %    BASOPHILS 0 0 - 2 %    ABS. NEUTROPHILS 2.3 1.8 - 8.0 K/UL    ABS. LYMPHOCYTES 1.7 0.9 - 3.6 K/UL    ABS. MONOCYTES 0.3 0.05 - 1.2 K/UL    ABS. EOSINOPHILS 0.2 0.0 - 0.4 K/UL    ABS.  BASOPHILS 0.0 0.0 - 0.1 K/UL    DF AUTOMATED     TSH 3RD GENERATION   Result Value Ref Range    TSH 2.01 0.36 - 3.74 uIU/mL         ASSESSMENT/PLAN  Diagnoses and all orders for this visit:  Diagnoses and all orders for this visit:     Type 2 diabetes mellitus without complication, without long-term current use of insulin ([facility] [address]  Controlled  Cont current regimen  Eye exam- 5/18  Foot exam- 2/18  Microalbumin 5/18  Lipid panel 2/18  a1c 2/18     Anemia of chronic disease  Monitoring  Normal GI work up 2014  Iron profile c/w anemia of chronic disease     Pure hypercholesterolemia  Fairly good range on  lipitor     Overactive bladder  Responding to ditropan, to cont    Adjustment disorder with mixed anxiety and depressed mood  Start zoloft     Memory difficulty   Depression contributing? Consult neuro  -     REFERRAL TO NEUROLOGY     Acute pain of left knee  -     XR HIP LT W OR WO PELV 2-3 VWS; Future  -     XR KNEE LT MIN 4 V; Future    Left hip pain  -     XR HIP LT W OR WO PELV 2-3 VWS; Future    BMI 30   Encouraged wt loss    Ff-up in 2 weeks or sooner prn    Patient understands plan of care. Patient has provided input and agrees with goals.

## 2018-06-18 NOTE — PROGRESS NOTES
1. Have you been to the ER, urgent care clinic since your last visit? Hospitalized since your last visit? No    2. Have you seen or consulted any other health care providers outside of the [address] [place] [address] since your last visit? Include any pap smears or colon screening.  No

## 2018-06-21 NOTE — PROGRESS NOTES
There is no evidence of fracture on hip or knee xrays. There is mild fluid on knee and degenerative changes on hip, if persistent pain will refer to ortho  pls notify pt.

## 2018-06-26 NOTE — PROGRESS NOTES
Patient identified with 2 identifiers (name and ). Patient aware of         There is no evidence of fracture on hip or knee xrays.  There is mild fluid on knee and degenerative changes on hip, patient. Patient sees Dr. Claudeen Hung and will schedule her own appt.

## 2018-06-28 RX ORDER — PANTOPRAZOLE SODIUM 40 MG/1
TABLET, DELAYED RELEASE ORAL
Qty: 90 TAB | Refills: 0 | Status: SHIPPED | OUTPATIENT
Start: 2018-06-28 | End: 2018-10-31 | Stop reason: SDUPTHER

## 2018-07-05 ENCOUNTER — OFFICE VISIT (OUTPATIENT)
Dept: FAMILY MEDICINE CLINIC | Age: 68
End: 2018-07-05

## 2018-07-05 VITALS
WEIGHT: 155.2 LBS | RESPIRATION RATE: 17 BRPM | BODY MASS INDEX: 29.3 KG/M2 | HEART RATE: 71 BPM | TEMPERATURE: 98 F | SYSTOLIC BLOOD PRESSURE: 122 MMHG | OXYGEN SATURATION: 98 % | DIASTOLIC BLOOD PRESSURE: 70 MMHG | HEIGHT: 61 IN

## 2018-07-05 DIAGNOSIS — E11.9 TYPE 2 DIABETES MELLITUS WITHOUT COMPLICATION, WITHOUT LONG-TERM CURRENT USE OF INSULIN (HCC): Primary | ICD-10-CM

## 2018-07-05 DIAGNOSIS — F43.21 GRIEF: ICD-10-CM

## 2018-07-05 DIAGNOSIS — E78.00 PURE HYPERCHOLESTEROLEMIA: ICD-10-CM

## 2018-07-05 DIAGNOSIS — L81.9 HYPERPIGMENTATION: ICD-10-CM

## 2018-07-05 DIAGNOSIS — F43.21 ADJUSTMENT DISORDER WITH DEPRESSED MOOD: ICD-10-CM

## 2018-07-05 RX ORDER — SERTRALINE HYDROCHLORIDE 100 MG/1
100 TABLET, FILM COATED ORAL DAILY
Qty: 90 TAB | Refills: 0 | Status: SHIPPED | OUTPATIENT
Start: 2018-07-05 | End: 2018-11-19 | Stop reason: SDUPTHER

## 2018-07-05 NOTE — PROGRESS NOTES
Stuart Cannon, 69 y.o.,  female    SUBJECTIVE  Ff-up    Depression- started zoloft on last visit and reports much improvement. More energy, able to focus. She Says  has passed away since last visit. Says sleep is ok, able to come to terms with his death. C/o hyperpigmentation of bilateral posterior ankle for years now, says wants to see dermatologist for this. No itching or rash. NIDDM-taking metformin and januvia. denies hypoglycemia. HL- taking lipitor    appt w/ dr. Whit Kinsey 7/27 for  increasing forgetfulness past month. Difficulty remembering names and events. OAB- reports recently started on oxybutynin and responding well to this. Chronic anemia- says past few years has developed this, on po fe. Denies fatigue, pica, melena. normal EGD/colonoscopy 2014. Previous iron profile more c/w ACD. Trial off po fe for the past few months with stable hgb level     GERD- on prilosec    Chronic neck pain with radiculopathy- reports following dr. Yenny Beavers for this, currently on gabapentin/flexeril and norco prn. 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 Asthma-doing well she says, on ICS and LAMA for maintenance,  following Dr. Jae Johnson  CRHIS on CPAP- reports consistent use,  following  Dr. Jayden Menjivar and hip pain have resolved    ROS:  See HPI, all others negative    ROS random episodes of chills, no wt loss/appetite changes, no hot flashes/night sweates/ fever    Patient Active Problem List   Diagnosis Code    Pure hypercholesterolemia E78.00    Overactive bladder N32.81    Chronic allergic rhinitis J30.9    Anemia E70.9    Uncomplicated asthma V38.499    Type 2 diabetes mellitus without complication, without long-term current use of insulin (HCC) E11.9       Current Outpatient Prescriptions   Medication Sig Dispense Refill    pantoprazole (PROTONIX) 40 mg tablet Take 1 Tab by mouth daily.  90 Tab 0    metFORMIN ER (GLUCOPHAGE XR) 500 mg tablet Take 2 Tabs by mouth daily (with dinner). 180 Tab 0    montelukast (SINGULAIR) 10 mg tablet Take 1 Tab by mouth daily. 90 Tab 2    cyclobenzaprine (FLEXERIL) 5 mg tablet Take 5 mg by mouth three (3) times daily as needed for Muscle Spasm(s).  aspirin delayed-release 81 mg tablet Take  by mouth daily.  cyanocobalamin (VITAMIN B12) 500 mcg tablet Take 500 mcg by mouth daily.  fluticasone-vilanterol (BREO ELLIPTA) 200-25 mcg/dose inhaler Take 1 Puff by inhalation daily.  atorvastatin (LIPITOR) 10 mg tablet Take 1 Tab by mouth daily. 90 Tab 0    oxybutynin (DITROPAN) 5 mg tablet Take 1 Tab by mouth two (2) times a day. 60 Tab 0    SITagliptin (JANUVIA) 50 mg tablet 50 mg.      gabapentin (NEURONTIN) 100 mg capsule 100 mg.      fluticasone (VERAMYST) 27.5 mcg/actuation nasal spray [place]  in Nose.  cycloSPORINE (RESTASIS) 0.05 % ophthalmic emulsion Instill 1 Drop in affected eye(s) Every 12 hours.  HYDROcodone-acetaminophen (NORCO) 5-325 mg per tablet   0    ipratropium-albuterol (COMBIVENT RESPIMAT)  mcg/actuation inhaler Take 1 inhalation inhaled by mouth Take As Needed.  ferrous sulfate 325 mg (65 mg iron) tablet 325 mg.      mometasone-formoterol (DULERA) 100-5 mcg/actuation HFA inhaler Take 2 Puffs by inhalation two (2) times a day.          Allergies   Allergen Reactions    Iodine Swelling    Nabumetone Swelling    Penicillins Unknown (comments)    Shellfish Containing Products Swelling    Sulfa (Sulfonamide Antibiotics) Swelling       Past Medical History:   Diagnosis Date    Allergic rhinitis     Anemia     Asthma     Candida vaginitis     CKD (chronic kidney disease) stage 3, GFR 30-59 ml/min     COPD (chronic obstructive pulmonary disease) (HCC)     Diabetes (HCC)     Diabetes (HCC)     GERD (gastroesophageal reflux disease)     h pylori gastritis egd 11/14 dr Latoya Nava    Headache     Hx of colonoscopy 11/05/2014    normal rpt 10 years, 11/2024, dr. Shon Warren Hypercholesterolemia     Mild dementia     OAB (overactive bladder)     CHRIS on CPAP     Radiculopathy     Urinary incontinence     UTI (urinary tract infection)        Social History     Social History    Marital status:      Spouse name: N/A    Number of children: N/A    Years of education: N/A     Occupational History    Not on file. Social History Main Topics    Smoking status: Former Smoker     Years: 20.00    Smokeless tobacco: Former User     Quit date: 1/1/1999    Alcohol use Yes    Drug use: No    Sexual activity: Not Currently     Partners: Male     Other Topics Concern    Not on file     Social History Narrative       History reviewed. No pertinent family history. OBJECTIVE    Physical Exam:     Visit Vitals    /78 (BP 1 Location: Left arm, BP Patient Position: Sitting)    Pulse 80    Temp 97.4 °F (36.3 °C) (Oral)    Resp 16    Ht 5' 1\" (1.549 m)    Wt 162 lb (73.5 kg)    SpO2 96%    BMI 30.61 kg/m2       General: alert, AA, in no apparent distress or pain  CVS: normal rate, regular rhythm, distinct S1 and S2  Lungs:clear to ausculation bilaterally, no crackles, wheezing or rhonchi noted  Skin: + posterior ankle hyperpigmentation  Psych:  mood and affect normal    Results for orders placed or performed during the hospital encounter of 00/54/74   METABOLIC PANEL, COMPREHENSIVE   Result Value Ref Range    Sodium 144 136 - 145 mmol/L    Potassium 4.0 3.5 - 5.5 mmol/L    Chloride 108 100 - 108 mmol/L    CO2 29 21 - 32 mmol/L    Anion gap 7 3.0 - 18 mmol/L    Glucose 88 74 - 99 mg/dL    BUN 16 7.0 - 18 MG/DL    Creatinine 1.38 (H) 0.6 - 1.3 MG/DL    BUN/Creatinine ratio 12 12 - 20      GFR est AA 46 (L) >60 ml/min/1.73m2    GFR est non-AA 38 (L) >60 ml/min/1.73m2    Calcium 9.2 8.5 - 10.1 MG/DL    Bilirubin, total 0.3 0.2 - 1.0 MG/DL    ALT (SGPT) 16 13 - 56 U/L    AST (SGOT) 15 15 - 37 U/L    Alk.  phosphatase 76 45 - 117 U/L    Protein, total 7.1 6.4 - 8.2 g/dL Albumin 3.8 3.4 - 5.0 g/dL    Globulin 3.3 2.0 - 4.0 g/dL    A-G Ratio 1.2 0.8 - 1.7     LIPID PANEL   Result Value Ref Range    LIPID PROFILE          Cholesterol, total 201 (H) <200 MG/DL    Triglyceride 75 <150 MG/DL    HDL Cholesterol 82 (H) 40 - 60 MG/DL    LDL, calculated 104 (H) 0 - 100 MG/DL    VLDL, calculated 15 MG/DL    CHOL/HDL Ratio 2.5 0 - 5.0     CBC WITH AUTOMATED DIFF   Result Value Ref Range    WBC 4.5 (L) 4.6 - 13.2 K/uL    RBC 3.88 (L) 4.20 - 5.30 M/uL    HGB 10.7 (L) 12.0 - 16.0 g/dL    HCT 31.6 (L) 35.0 - 45.0 %    MCV 81.4 74.0 - 97.0 FL    MCH 27.6 24.0 - 34.0 PG    MCHC 33.9 31.0 - 37.0 g/dL    RDW 15.7 (H) 11.6 - 14.5 %    PLATELET 492 010 - 971 K/uL    MPV 9.3 9.2 - 11.8 FL    NEUTROPHILS 50 40 - 73 %    LYMPHOCYTES 39 21 - 52 %    MONOCYTES 6 3 - 10 %    EOSINOPHILS 5 0 - 5 %    BASOPHILS 0 0 - 2 %    ABS. NEUTROPHILS 2.3 1.8 - 8.0 K/UL    ABS. LYMPHOCYTES 1.7 0.9 - 3.6 K/UL    ABS. MONOCYTES 0.3 0.05 - 1.2 K/UL    ABS. EOSINOPHILS 0.2 0.0 - 0.4 K/UL    ABS. BASOPHILS 0.0 0.0 - 0.1 K/UL    DF AUTOMATED     TSH 3RD GENERATION   Result Value Ref Range    TSH 2.01 0.36 - 3.74 uIU/mL         ASSESSMENT/PLAN  Diagnoses and all orders for this visit:    Adjustment disorder with mixed anxiety and depressed mood  Improving, trial Increase zoloft to100 mg    Grief       Type 2 diabetes mellitus without complication, without long-term current use of insulin (HCC)  Controlled  Cont current regimen  Eye exam- 5/18  Foot exam- 2/18  Microalbumin 5/18  Lipid panel 2/18  a1c 2/18     Anemia of chronic disease  Monitoring  Normal GI work up 2014  Iron profile c/w anemia of chronic disease     Pure hypercholesterolemia  Fairly good range on  lipitor     Overactive bladder  Responding to ditropan, to cont    Hyperpigmentation  Referral to derm     Memory difficulty   Depression contributing?   Has appt with dr. Bray Home 7/27    BMI 30   Encouraged wt loss    Ff-up in  4 weeks or sooner prn    Patient understands plan of care. Patient has provided input and agrees with goals.

## 2018-07-05 NOTE — PATIENT INSTRUCTIONS
Recovering From Depression: Care Instructions  Your Care Instructions    Taking good care of yourself is important as you recover from depression. In time, your symptoms will fade as your treatment takes hold. Do not give up. Instead, focus your energy on getting better. Your mood will improve. It just takes some time. Focus on things that can help you feel better, such as being with friends and family, eating well, and getting enough rest. But take things slowly. Do not do too much too soon. You will begin to feel better gradually. Follow-up care is a key part of your treatment and safety. Be sure to make and go to all appointments, and call your doctor if you are having problems. It's also a good idea to know your test results and keep a list of the medicines you take. How can you care for yourself at home? Be realistic  · If you have a large task to do, break it up into smaller steps you can handle, and just do what you can. · You may want to put off important decisions until your depression has lifted. If you have plans that will have a major impact on your life, such as marriage, divorce, or a job change, try to wait a bit. Talk it over with friends and loved ones who can help you look at the overall picture first.  · Reaching out to people for help is important. Do not isolate yourself. Let your family and friends help you. Find someone you can trust and confide in, and talk to that person. · Be patient, and be kind to yourself. Remember that depression is not your fault and is not something you can overcome with willpower alone. Treatment is necessary for depression, just like for any other illness. Feeling better takes time, and your mood will improve little by little. Stay active  · Stay busy and get outside. Take a walk, or try some other light exercise. · Talk with your doctor about an exercise program. Exercise can help with mild depression. · Go to a movie or concert.  Take part in a N[employer] activity or other social gathering. Go to a [employer] game. · Ask a friend to have dinner with you. Take care of yourself  · Eat a balanced diet with plenty of fresh fruits and vegetables, whole grains, and lean protein. If you have lost your appetite, eat small snacks rather than large meals. · Avoid drinking alcohol or using illegal drugs. Do not take medicines that have not been prescribed for you. They may interfere with medicines you may be taking for depression, or they may make your depression worse. · Take your medicines exactly as they are prescribed. You may start to feel better within 1 to 3 weeks of taking antidepressant medicine. But it can take as many as 6 to 8 weeks to see more improvement. If you have questions or concerns about your medicines, or if you do not notice any improvement by 3 weeks, talk to your doctor. · If you have any side effects from your medicine, tell your doctor. Antidepressants can make you feel tired, dizzy, or nervous. Some people have dry mouth, constipation, headaches, sexual problems, or diarrhea. Many of these side effects are mild and will go away on their own after you have been taking the medicine for a few weeks. Some may last longer. Talk to your doctor if side effects are bothering you too much. You might be able to try a different medicine. · Get enough sleep. If you have problems sleeping:  ¨ Go to bed at the same time every night, and get up at the same time every morning. ¨ Keep your bedroom dark and quiet. ¨ Do not exercise after 5:00 p.m. ¨ Avoid drinks with caffeine after 5:00 p.m. · Avoid sleeping pills unless they are prescribed by the doctor treating your depression. Sleeping pills may make you groggy during the day, and they may interact with other medicine you are taking. · If you have any other illnesses, such as diabetes, heart disease, or high blood pressure, make sure to continue with your treatment.  Tell your doctor about all of the medicines you take, including those with or without a prescription. · Keep the numbers for these national suicide hotlines: 9-446-373-TALK (8-469.454.4976) and 5-298-QWTLOQF (2-297.773.5759). If you or someone you know talks about suicide or feeling hopeless, get help right away. When should you call for help? Call 911 anytime you think you may need emergency care. For example, call if:  ? · You feel like hurting yourself or someone else. ? · Someone you know has depression and is about to attempt or is attempting suicide. ?Call your doctor now or seek immediate medical care if:  ? · You hear voices. ? · Someone you know has depression and:  ¨ Starts to give away his or her possessions. ¨ Uses illegal drugs or drinks alcohol heavily. ¨ Talks or writes about death, including writing suicide notes or talking about guns, knives, or pills. ¨ Starts to spend a lot of time alone. ¨ Acts very aggressively or suddenly appears calm. ? Watch closely for changes in your health, and be sure to contact your doctor if:  ? · You do not get better as expected. Where can you learn more? Go to http://danika-sophia.info/. Enter J309 in the search box to learn more about \"Recovering From Depression: Care Instructions. \"  Current as of: May 12, 2017  Content Version: 11.4  © 1965-2879 [employer]. Care instructions adapted under license by [employer] (which disclaims liability or warranty for this information). If you have questions about a medical condition or this instruction, always ask your healthcare professional. [address] any warranty or liability for your use of this information.

## 2018-07-05 NOTE — MR AVS SNAPSHOT
[address]7 [facility] 250 200 [facility] [address] 
[phone] Patient: Haylie Buck MRN: [id number] OWK:[birth year] Visit Information Date & Time Provider Department Dept. Phone Encounter #  
 7/5/2018 11:30 AM Mirza Thomas, [address] [place] [address] [id number] Follow-up Instructions Return in about 4 weeks (around 8/2/2018), or if symptoms worsen or fail to improve. Your Appointments 7/20/2018 10:00 AM  
FOLLOW UP EXAM with Mirza Thomas MD  
2056 [facility] ([facility]) Appt Note: Routine follow up [address] [facility] [address] [facility] 1101 [facility] 250 200 [facility]  
  
    
 7/27/2018  9:30 AM  
New Patient with Jerman Mercedes NP [address] at [address] [facility]) Appt Note: Dr Baez/Memory Difficulty/Ref and notes in cc  
 [address] [facility] [address] 200 [facility] Upcoming Health Maintenance Date Due ZOSTER VACCINE AGE 60> 3/21/2008 Influenza Age 5 to Adult 8/1/2018 HEMOGLOBIN A1C Q6M 8/12/2018 MICROALBUMIN Q1 2/12/2019 FOOT EXAM Q1 4/10/2019 EYE EXAM RETINAL OR DILATED Q1 5/21/2019 LIPID PANEL Q1 6/13/2019 BREAST CANCER SCRN MAMMOGRAM 4/23/2020 GLAUCOMA SCREENING Q2Y 5/21/2020 DTaP/Tdap/Td series (2 - Td) 7/29/2024 COLONOSCOPY 11/5/2024 Allergies as of 7/5/2018  Review Complete On: 7/5/2018 By: Mirza Thomas MD  
  
 Severity Noted Reaction Type Reactions Iodine  06/11/2010    Swelling Nabumetone  06/11/2010    Swelling Penicillins  06/11/2010    Unknown (comments) Shellfish Containing Products  06/11/2010    Swelling Sulfa (Sulfonamide Antibiotics)  06/11/2010    Swelling Current Immunizations  Reviewed on 2/14/2018 Name Date Pneumococcal Conjugate (PCV-13) 6/23/2016 Pneumococcal Vaccine (Unspecified Type) 10/3/2017 Tdap 7/29/2014 12:00 AM  
  
 Not reviewed this visit You Were Diagnosed With   
  
 Codes Comments Type 2 diabetes mellitus without complication, without long-term current use of insulin (HCC)    -  Primary ICD-10-CM: E11.9 ICD-9-CM: 250.00 Pure hypercholesterolemia     ICD-10-CM: E78.00 ICD-9-CM: 272.0 Adjustment disorder with depressed mood     ICD-10-CM: F43.21 ICD-9-CM: 309.0 Grief     ICD-10-CM: G12.97 ICD-9-CM: 309.0 Vitals BP Pulse Temp Resp Height(growth percentile) Weight(growth percentile) 122/70 (BP 1 Location: Left arm, BP Patient Position: Sitting) 71 98 °F (36.7 °C) (Oral) 17 5' 1\" (1.549 m) 155 lb 3.2 oz (70.4 kg) SpO2 BMI OB Status Smoking Status 98% 29.32 kg/m2 Hysterectomy Former Smoker Vitals History BMI and BSA Data Body Mass Index Body Surface Area  
 29.32 kg/m 2 1.74 m 2 Preferred Pharmacy Pharmacy Name Phone [address] [facility], [address] [facility] Your Updated Medication List  
  
   
This list is accurate as of 7/5/18 12:16 PM.  Always use your most recent med list.  
  
  
  
  
 aspirin delayed-release 81 mg tablet Take  by mouth daily. atorvastatin 10 mg tablet Commonly known as:  LIPITOR Take 1 Tab by mouth daily. BREO ELLIPTA 200-25 mcg/dose inhaler Generic drug:  fluticasone-vilanterol Take 1 Puff by inhalation daily. cholecalciferol 400 unit Tab tablet Commonly known as:  VITAMIN D3 Take  by mouth daily. cyanocobalamin 500 mcg tablet Commonly known as:  VITAMIN B12 Take 500 mcg by mouth daily. cyclobenzaprine 5 mg tablet Commonly known as:  FLEXERIL Take 5 mg by mouth three (3) times daily as needed for Muscle Spasm(s). cycloSPORINE 0.05 % ophthalmic emulsion Commonly known as:  RESTASIS Instill 1 Drop in affected eye(s) Every 12 hours. docusate sodium 50 mg capsule Commonly known as:  Arman Duster Take 50 mg by mouth two (2) times a day. fluticasone 27.5 mcg/actuation nasal spray Commonly known as:  VERAMYST Spray  in Nose. fluticasone 50 mcg/actuation inhaler Commonly known as:  FLOVENT DISKUS Take  by inhalation. gabapentin 100 mg capsule Commonly known as:  NEURONTIN  
100 mg two (2) times a day. HYDROcodone-acetaminophen 5-325 mg per tablet Commonly known as:  NORCO  
  
 ipratropium-albuterol  mcg/actuation inhaler Commonly known as:  Cheyenne Watson Take 1 inhalation inhaled by mouth Take As Needed. metFORMIN  mg tablet Commonly known as:  GLUCOPHAGE XR Take 2 Tabs by mouth daily (with dinner). MIGRELIEF PO Take  by mouth.  
  
 montelukast 10 mg tablet Commonly known as:  SINGULAIR Take 1 Tab by mouth daily. oxybutynin 5 mg tablet Commonly known as:  WESTCELP Take 1 Tab by mouth two (2) times a day. pantoprazole 40 mg tablet Commonly known as:  PROTONIX  
TAKE 1 TABLET BY MOUTH ONCE DAILY prednisoLONE acetate 1 % ophthalmic suspension Commonly known as:  PRED FORTE Administer 1 Drop to both eyes four (4) times daily. sertraline 100 mg tablet Commonly known as:  ZOLOFT Take 1 Tab by mouth daily. SITagliptin 50 mg tablet Commonly known as:  Ol[place] Norlander 50 mg.  
  
 sodium chloride 0.65 % nasal squeeze bottle Commonly known as:  OCEAN  
1 Spray as needed for Congestion. Prescriptions Sent to Pharmacy Refills  
 sertraline (ZOLOFT) 100 mg tablet 0 Sig: Take 1 Tab by mouth daily. Class: Normal  
 Pharmacy: [facility] [address] JODIE ROSENTHAL [address] Ph #: [phone] Route: Oral  
  
Follow-up Instructions Return in about 4 weeks (around 8/2/2018), or if symptoms worsen or fail to improve. Patient Instructions Recovering From Depression: Care Instructions Your Care Instructions Taking good care of yourself is important as you recover from depression. In time, your symptoms will fade as your treatment takes hold. Do not give up. Instead, focus your energy on getting better. Your mood will improve. It just takes some time. Focus on things that can help you feel better, such as being with friends and family, eating well, and getting enough rest. But take things slowly. Do not do too much too soon. You will begin to feel better gradually. Follow-up care is a key part of your treatment and safety. Be sure to make and go to all appointments, and call your doctor if you are having problems. It's also a good idea to know your test results and keep a list of the medicines you take. How can you care for yourself at home? Be realistic · If you have a large task to do, break it up into smaller steps you can handle, and just do what you can. · You may want to put off important decisions until your depression has lifted. If you have plans that will have a major impact on your life, such as marriage, divorce, or a job change, try to wait a bit. Talk it over with friends and loved ones who can help you look at the overall picture first. 
· Reaching out to people for help is important. Do not isolate yourself. Let your family and friends help you. Find someone you can trust and confide in, and talk to that person. · Be patient, and be kind to yourself. Remember that depression is not your fault and is not something you can overcome with willpower alone. Treatment is necessary for depression, just like for any other illness. Feeling better takes time, and your mood will improve little by little. Stay active · Stay busy and get outside. Take a walk, or try some other light exercise. · Talk with your doctor about an exercise program. Exercise can help with mild depression. · Go to a movie or concert. Take part in a [employer] activity or other social gathering. Go to a ball game. · Ask a friend to have dinner with you. Take care of yourself · Eat a balanced diet with plenty of fresh fruits and vegetables, whole grains, and lean protein. If you have lost your appetite, eat small snacks rather than large meals. · Avoid drinking alcohol or using illegal drugs. Do not take medicines that have not been prescribed for you. They may interfere with medicines you may be taking for depression, or they may make your depression worse. · Take your medicines exactly as they are prescribed. You may start to feel better within 1 to 3 weeks of taking antidepressant medicine. But it can take as many as 6 to 8 weeks to see more improvement. If you have questions or concerns about your medicines, or if you do not notice any improvement by 3 weeks, talk to your doctor. · If you have any side effects from your medicine, tell your doctor. Antidepressants can make you feel tired, dizzy, or nervous. Some people have dry mouth, constipation, headaches, sexual problems, or diarrhea. Many of these side effects are mild and will go away on their own after you have been taking the medicine for a few weeks. Some may last longer. Talk to your doctor if side effects are bothering you too much. You might be able to try a different medicine. · Get enough sleep. If you have problems sleeping: ¨ Go to bed at the same time every night, and get up at the same time every morning. ¨ Keep your bedroom dark and quiet. ¨ Do not exercise after 5:00 p.m. ¨ Avoid drinks with caffeine after 5:00 p.m. · Avoid sleeping pills unless they are prescribed by the doctor treating your depression. Sleeping pills may make you groggy during the day, and they may interact with other medicine you are taking.  
· If you have any other illnesses, such as diabetes, heart disease, or high blood pressure, make sure to continue with your treatment. Tell your doctor about all of the medicines you take, including those with or without a prescription. · Keep the numbers for these national suicide hotlines: 4-780-618-TALK (7-532.242.5823) and 8-945-JIHXZZK (1-271.147.6902). If you or someone you know talks about suicide or feeling hopeless, get help right away. When should you call for help? Call 911 anytime you think you may need emergency care. For example, call if: 
? · You feel like hurting yourself or someone else. ? · Someone you know has depression and is about to attempt or is attempting suicide. ?Call your doctor now or seek immediate medical care if: 
? · You hear voices. ? · Someone you know has depression and: 
¨ Starts to give away his or her possessions. ¨ Uses illegal drugs or drinks alcohol heavily. ¨ Talks or writes about death, including writing suicide notes or talking about guns, knives, or pills. ¨ Starts to spend a lot of time alone. ¨ Acts very aggressively or suddenly appears calm. ? Watch closely for changes in your health, and be sure to contact your doctor if: 
? · You do not get better as expected. Where can you learn more? Go to http://danika-sophia.info/. Enter T379 in the search box to learn more about \"Recovering From Depression: Care Instructions. \" Current as of: May 12, 2017 Content Version: 11.4 © 6326-7662 [employer]. Care instructions adapted under license by [employer] (which disclaims liability or warranty for this information). If you have questions about a medical condition or this instruction, always ask your healthcare professional. [place] [address] any warranty or liability for your use of this information. Introducing [facility]!    
 [employer] introduces [employer] patient portal. Now you can access parts of your medical record, email your doctor's office, and request medication refills online. 1. In your internet browser, go to [email or web address] 2. Click on the First Time User? Click Here link in the Sign In box. You will see the New Member Sign Up page. 3. Enter your [employer] Access Code exactly as it appears below. You will not need to use this code after youve completed the sign-up process. If you do not sign up before the expiration date, you must request a new code. · [employer] Access Code: [email or web address] Expires: 7/19/2018 12:15 PM 
 
4. Enter the last four digits of your Social Security Number (xxxx) and Date of Birth (mm/dd/yyyy) as indicated and click Submit. You will be taken to the next sign-up page. 5. Create a [employer] ID. This will be your [employer] login ID and cannot be changed, so think of one that is secure and easy to remember. 6. Create a [employer] password. You can change your password at any time. 7. Enter your Password Reset Question and Answer. This can be used at a later time if you forget your password. 8. Enter your e-mail address. You will receive e-mail notification when new information is available in [address] 9. Click Sign Up. You can now view and download portions of your medical record. 10. Click the Download Summary menu link to download a portable copy of your medical information. If you have questions, please visit the Frequently Asked Questions section of the [employer] website. Remember, [employer] is NOT to be used for urgent needs. For medical emergencies, dial 911. Now available from your iPhone and Android! Please provide this summary of care documentation to your next provider. Your primary care clinician is listed as Jacquelin Cheek. If you have any questions after today's visit, please call [phone].

## 2018-07-27 ENCOUNTER — OFFICE VISIT (OUTPATIENT)
Dept: NEUROLOGY | Age: 68
End: 2018-07-27

## 2018-07-27 VITALS
HEART RATE: 66 BPM | WEIGHT: 150 LBS | TEMPERATURE: 97.4 F | HEIGHT: 61 IN | SYSTOLIC BLOOD PRESSURE: 140 MMHG | RESPIRATION RATE: 18 BRPM | BODY MASS INDEX: 28.32 KG/M2 | DIASTOLIC BLOOD PRESSURE: 70 MMHG | OXYGEN SATURATION: 99 %

## 2018-07-27 DIAGNOSIS — G31.84 MCI (MILD COGNITIVE IMPAIRMENT): ICD-10-CM

## 2018-07-27 DIAGNOSIS — R41.3 MEMORY LOSS: Primary | ICD-10-CM

## 2018-07-27 NOTE — MR AVS SNAPSHOT
[address] [facility] [address] 
 
 
 [address] [facility] 
[phone] Patient: Esau Mason MRN: [id number] Visit Information Date & Time Provider Department Dept. Phone Encounter #  
 7/27/2018  9:30 AM Landon Ballard NP [address] at [address] [facility] [id number] Follow-up Instructions Return On an as needed basis. Mabeline Sink Your Appointments 8/3/2018 11:30 AM  
FOLLOW UP EXAM with Sol Castillo MD  
[facility] ([address]) Appt Note: Routine follow up; - [address] [facility]  
[address] [facility] [address] [facility] Upcoming Health Maintenance Date Due ZOSTER VACCINE AGE 60> 3/21/2008 HEMOGLOBIN A1C Q6M 8/12/2018 Influenza Age 5 to Adult 8/1/2018 MICROALBUMIN Q1 2/12/2019 FOOT EXAM Q1 4/10/2019 EYE EXAM RETINAL OR DILATED Q1 5/21/2019 LIPID PANEL Q1 6/13/2019 BREAST CANCER SCRN MAMMOGRAM 4/23/2020 GLAUCOMA SCREENING Q2Y 5/21/2020 DTaP/Tdap/Td series (2 - Td) 7/29/2024 COLONOSCOPY 11/5/2024 Allergies as of 7/27/2018  Review Complete On: 7/27/2018 By: Sammie Capellan LPN Severity Noted Reaction Type Reactions Iodine  06/11/2010    Swelling Nabumetone  06/11/2010    Swelling Penicillins  06/11/2010    Unknown (comments) Shellfish Containing Products  06/11/2010    Swelling Sulfa (Sulfonamide Antibiotics)  06/11/2010    Swelling Current Immunizations  Reviewed on 2/14/2018 Name Date Pneumococcal Conjugate (PCV-13) 6/23/2016 Pneumococcal Vaccine (Unspecified Type) 10/3/2017 Tdap 7/29/2014 12:00 AM  
  
 Not reviewed this visit You Were Diagnosed With   
  
 Codes Comments Memory loss    -  Primary ICD-10-CM: R41.3 ICD-9-CM: 780.93 Vitals BP Pulse Temp Resp Height(growth percentile) Weight(growth percentile) 140/70 (BP 1 Location: Left arm, BP Patient Position: Sitting) 66 97.4 °F (36.3 °C) (Oral) 18 5' 1\" (1.549 m) 150 lb (68 kg) SpO2 BMI OB Status Smoking Status 99% 28.34 kg/m2 Hysterectomy Former Smoker BMI and BSA Data Body Mass Index Body Surface Area  
 28.34 kg/m 2 1.71 m 2 Preferred Pharmacy Pharmacy Name Phone [facility] [address], [address] [facility] Your Updated Medication List  
  
   
This list is accurate as of 7/27/18 10:18 AM.  Always use your most recent med list.  
  
  
  
  
 aspirin delayed-release 81 mg tablet Take  by mouth daily. atorvastatin 10 mg tablet Commonly known as:  LIPITOR Take 1 Tab by mouth daily. BREO ELLIPTA 200-25 mcg/dose inhaler Generic drug:  fluticasone-vilanterol Take 1 Puff by inhalation daily. cholecalciferol 400 unit Tab tablet Commonly known as:  VITAMIN D3 Take  by mouth daily. cyanocobalamin 500 mcg tablet Commonly known as:  VITAMIN B12 Take 500 mcg by mouth daily. cyclobenzaprine 5 mg tablet Commonly known as:  FLEXERIL Take 5 mg by mouth three (3) times daily as needed for Muscle Spasm(s). cycloSPORINE 0.05 % ophthalmic emulsion Commonly known as:  RESTASIS Instill 1 Drop in affected eye(s) Every 12 hours. docusate sodium 50 mg capsule Commonly known as:  [place] Take 50 mg by mouth two (2) times a day. fluticasone 27.5 mcg/actuation nasal spray Commonly known as:  VERAMYST Spray  in Nose. fluticasone 50 mcg/actuation inhaler Commonly known as:  FLOVENT DISKUS Take  by inhalation. gabapentin 100 mg capsule Commonly known as:  NEURONTIN  
100 mg two (2) times a day. HYDROcodone-acetaminophen 5-325 mg per tablet Commonly known as:  NORCO  
  
 ipratropium-albuterol  mcg/actuation inhaler Commonly known as:  Orie Hones Take 1 inhalation inhaled by mouth Take As Needed. metFORMIN  mg tablet Commonly known as:  GLUCOPHAGE XR Take 2 Tabs by mouth daily (with dinner). MIGRELIEF PO Take  by mouth.  
  
 montelukast 10 mg tablet Commonly known as:  SINGULAIR Take 1 Tab by mouth daily. oxybutynin 5 mg tablet Commonly known as:  RLIJHPDY Take 1 Tab by mouth two (2) times a day. pantoprazole 40 mg tablet Commonly known as:  PROTONIX  
TAKE 1 TABLET BY MOUTH ONCE DAILY prednisoLONE acetate 1 % ophthalmic suspension Commonly known as:  PRED FORTE Administer 1 Drop to both eyes four (4) times daily. sertraline 100 mg tablet Commonly known as:  ZOLOFT Take 1 Tab by mouth daily. SITagliptin 50 mg tablet Commonly known as:  Eve Pipe 50 mg.  
  
 sodium chloride 0.65 % nasal squeeze bottle Commonly known as:  OCEAN  
1 Spray as needed for Congestion. Follow-up Instructions Return On an as needed basis. Iram Patiño Patient Instructions At this time no additional recommended evaluation for memory loss. Reviewed recent neuropsychology report from March of this year. Consideration for repeat testing recommended in 18 months. Recent MRI reviewed and do not wish to order additional imaging/testing at this time. No routine follow up at this time. Preventing Falls: After Your Visit Your Care Instructions Getting around your home safely can be a challenge if you have injuries or health problems that make it easy for you to fall. Loose rugs and furniture in walkways are among the dangers for many older people who have problems walking or who have poor eyesight. People who have conditions such as arthritis, osteoporosis, or dementia also have to be careful not to fall. You can make your home safer with a few simple measures. Follow-up care is a key part of your treatment and safety.  Be sure to make and go to all appointments, and call your doctor if you are having problems. It's also a good idea to know your test results and keep a list of the medicines you take. How can you care for yourself at home? Taking care of yourself You may get dizzy if you do not drink enough water. To prevent dehydration, drink plenty of fluids, enough so that your urine is light yellow or clear like water. Choose water and other caffeine-free clear liquids. If you have kidney, heart, or liver disease and have to limit fluids, talk with your doctor before you increase the amount of fluids you drink. Exercise regularly to improve your strength, muscle tone, and balance. Walk if you can. Swimming may be a good choice if you cannot walk easily. Have your vision and hearing checked each year or any time you notice a change. If you have trouble seeing and hearing, you might not be able to avoid objects and could lose your balance. Know the side effects of the medicines you take. Ask your doctor or pharmacist whether the medicines you take can affect your balance. Sleeping pills or sedatives can affect your balance. Limit the amount of alcohol you drink. Alcohol can impair your balance and other senses. Ask your doctor whether calluses or corns on your feet need to be removed. If you wear loose-fitting shoes because of calluses or corns, you can lose your balance and fall. Talk to your doctor if you have numbness in your feet. Preventing falls at home Remove raised doorway thresholds, throw rugs, and clutter. Repair loose carpet or raised areas in the floor. Move furniture and electrical cords to keep them out of walking paths. Use nonskid floor wax, and wipe up spills right away, especially on ceramic tile floors. If you use a walker or cane, put rubber tips on it. If you use crutches, clean the bottoms of them regularly with an abrasive pad, such as steel wool. Keep your house well lit, especially stairways, porches, and outside walkways. Use night-lights in areas such as hallways and bathrooms. Add extra light switches or use remote switches (such as switches that go on or off when you clap your hands) to make it easier to turn lights on if you have to get up during the night. Install sturdy handrails on stairways. Move items in your cabinets so that the things you use a lot are on the lower shelves (about waist level). Keep a cordless phone and a flashlight with new batteries by your bed. If possible, put a phone in each of the main rooms of your house, or carry a cell phone in case you fall and cannot reach a phone. Or, you can wear a device around your neck or wrist. You push a button that sends a signal for help. Wear low-heeled shoes that fit well and give your feet good support. Use footwear with nonskid soles. Check the heels and soles of your shoes for wear. Repair or replace worn heels or soles. Do not wear socks without shoes on wood floors. Walk on the grass when the sidewalks are slippery. If you live in an area that gets snow and ice in the winter, sprinkle salt on slippery steps and sidewalks. Preventing falls in the bath Install grab bars and nonskid mats inside and outside your shower or tub and near the toilet and sinks. Use shower chairs and bath benches. Use a hand-held shower head that will allow you to sit while showering. Get into a tub or shower by putting the weaker leg in first. Get out of a tub or shower with your strong side first.  
Repair loose toilet seats and consider installing a raised toilet seat to make getting on and off the toilet easier. Keep your bathroom door unlocked while you are in the shower. Where can you learn more? Go to [employer] Enter [phone] in the search box to learn more about \"Preventing Falls: After Your Visit. \"   
 © 0307-7870 Healthwise, Incorporated. Care instructions adapted under license by [facility] (which disclaims liability or warranty for this information). This care instruction is for use with your licensed healthcare professional. If you have questions about a medical condition or this instruction, always ask your healthcare professional. [address] any warranty or liability for your use of this information. Content Version: 10.1.149173; Current as of: May 15, 2013 Introducing [facility]! [facility] introduces [employer] patient portal. Now you can access parts of your medical record, email your doctor's office, and request medication refills online. 1. In your internet browser, go to [email or web address] 2. Click on the First Time User? Click Here link in the Sign In box. You will see the New Member Sign Up page. 3. Enter your Whim Access Code exactly as it appears below. You will not need to use this code after youve completed the sign-up process. If you do not sign up before the expiration date, you must request a new code. · Whim Access Code: [email or web address] Expires: 10/25/2018  9:11 AM 
 
4. Enter the last four digits of your Social Security Number (xxxx) and Date of Birth (mm/dd/yyyy) as indicated and click Submit. You will be taken to the next sign-up page. 5. Create a Whim ID. This will be your Whim login ID and cannot be changed, so think of one that is secure and easy to remember. 6. Create a Whim password. You can change your password at any time. 7. Enter your Password Reset Question and Answer. This can be used at a later time if you forget your password. 8. Enter your e-mail address. You will receive e-mail notification when new information is available in [address] 9. Click Sign Up. You can now view and download portions of your medical record. 10. Click the Download Summary menu link to download a portable copy of your medical information. If you have questions, please visit the Frequently Asked Questions section of the [employer] website. Remember, [employer] is NOT to be used for urgent needs. For medical emergencies, dial 911. Now available from your iPhone and Android! Please provide this summary of care documentation to your next provider. Your primary care clinician is listed as Safia Lutz. If you have any questions after today's visit, please call [phone].

## 2018-07-27 NOTE — PROGRESS NOTES
Chief Complaint   Patient presents with    Memory Loss     decline in memory since 2012     Patient in rm. 1

## 2018-07-27 NOTE — PROGRESS NOTES
[facility]  [address] [facility], [address], [place], [address]  [address] [place] Back, [address]  Office:  [phone]  Fax: [phone]    Referring: Jazmin Rodriguez MD    Chief Complaint   Patient presents with    Memory Loss     decline in memory since 2012     This is a 78-year-old female who presents for new patient evaluation with chief complaint of memory loss. Patient presents to the office today alone and is the sole provider of the history. She endorses noticing a decline in her memory since approximately 6 years ago. Patient mentions she was sent for evaluation by her new primary care provider. Associated with short-term memory loss. She mentions that she recently buried her . She lost her son sometime ago. She lives at home alone. She endorses having a home health nurse come out several times a week. She does have a niece who will stop by. She drove to the office today and is not accompanied by any family members. She says that she is able to drive without difficulty. Denies getting into any traffic accidents. Endorses obeying traffic laws. She endorses managing her own finances. She takes her medications without any assistance. She says that she puts them in a pillbox for organization. At one point she said that she did leave the stove on. This was years ago. She says she does not cook much now. She says that she will use the microwave. Denies hallucinations. Denies wandering. Denies aggression. She recently was seen by [facility] neurology. She had neuropsychology evaluation this past March. She said the testing noted she had \"improved. \"  She endorses recently changing providers, and just wants a doctor who will \"listen to her. \"  She says in the past she had been with a provider that would make her so mad she was \"scared that she was going to hurt them. \" Endorses history of depression. Endorses being on Zoloft at present.   She says she takes over-the-counter medications specifically to improve memory. Endorses taking an over-the-counter vitamin for her migraine as well. She says this helps occasionally. Positive family history of memory trouble. No other complaints at this time. Memory Questions:  Needs help managing finances? No  Needs help shopping? No  Needs help taking medications correctly? No  Getting lost in familiar places? No  Need help with ADLs? No    Unsafe behaviors:   Aggression-Denies  Wandering- denies   Kitchen-Did leave the stove on once  Driving (obeying signs, etc) Endorses driving and obeying traffic laws. Past Medical History:   Diagnosis Date    Allergic rhinitis     Anemia     Asthma     Candida vaginitis     CKD (chronic kidney disease) stage 3, GFR 30-59 ml/min     COPD (chronic obstructive pulmonary disease) (HCC)     Diabetes (HCC)     Diabetes (HCC)     GERD (gastroesophageal reflux disease)     h pylori gastritis egd 11/14 dr Latoya Nava    Headache     Hx of colonoscopy 11/05/2014    normal rpt 10 years, 11/2024, dr. Latoya Nava    Hypercholesterolemia     Mild dementia     OAB (overactive bladder)     CHRIS on CPAP     Radiculopathy     Urinary incontinence     UTI (urinary tract infection)        Past Surgical History:   Procedure Laterality Date    HX COLONOSCOPY  11/05/2014    Normal/Repeat in 10 years/Dr. Stephanie Barajas    HX HYSTERECTOMY  1970    HX ORTHOPAEDIC Right 02/2012    knee replacement        Current Outpatient Prescriptions   Medication Sig Dispense Refill    sertraline (ZOLOFT) 100 mg tablet Take 1 Tab by mouth daily. 90 Tab 0    pantoprazole (PROTONIX) 40 mg tablet TAKE 1 TABLET BY MOUTH ONCE DAILY 90 Tab 0    fluticasone (FLOVENT DISKUS) 50 mcg/actuation inhaler Take  by inhalation.  sodium chloride (OCEAN) 0.65 % nasal squeeze bottle 1 Spray as needed for Congestion.  docusate sodium (COLACE) 50 mg capsule Take 50 mg by mouth two (2) times a day.       cholecalciferol (VITAMIN D3) 400 unit tab tablet Take  by mouth daily.  B2/magnesium cit,oxid/feverfew (MIGRELIEF PO) Take  by mouth.  prednisoLONE acetate (PRED FORTE) 1 % ophthalmic suspension Administer 1 Drop to both eyes four (4) times daily.  metFORMIN ER (GLUCOPHAGE XR) 500 mg tablet Take 2 Tabs by mouth daily (with dinner). 180 Tab 0    montelukast (SINGULAIR) 10 mg tablet Take 1 Tab by mouth daily. 90 Tab 2    cyclobenzaprine (FLEXERIL) 5 mg tablet Take 5 mg by mouth three (3) times daily as needed for Muscle Spasm(s).  aspirin delayed-release 81 mg tablet Take  by mouth daily.  cyanocobalamin (VITAMIN B12) 500 mcg tablet Take 500 mcg by mouth daily.  fluticasone-vilanterol (BREO ELLIPTA) 200-25 mcg/dose inhaler Take 1 Puff by inhalation daily.  atorvastatin (LIPITOR) 10 mg tablet Take 1 Tab by mouth daily. 90 Tab 0    oxybutynin (DITROPAN) 5 mg tablet Take 1 Tab by mouth two (2) times a day. 60 Tab 0    SITagliptin (JANUVIA) 50 mg tablet 50 mg.      gabapentin (NEURONTIN) 100 mg capsule 100 mg two (2) times a day.  fluticasone (VERAMYST) 27.5 mcg/actuation nasal spray Cosmos  in Nose.  cycloSPORINE (RESTASIS) 0.05 % ophthalmic emulsion Instill 1 Drop in affected eye(s) Every 12 hours.  HYDROcodone-acetaminophen (NORCO) 5-325 mg per tablet   0    ipratropium-albuterol (COMBIVENT RESPIMAT)  mcg/actuation inhaler Take 1 inhalation inhaled by mouth Take As Needed. Allergies   Allergen Reactions    Iodine Swelling    Nabumetone Swelling    Penicillins Unknown (comments)    Shellfish Containing Products Swelling    Sulfa (Sulfonamide Antibiotics) Swelling       Social History   Substance Use Topics    Smoking status: Former Smoker     Years: 20.00    Smokeless tobacco: Former User     Quit date: 1/1/1999    Alcohol use Yes       History reviewed. No pertinent family history.     Review of Systems:  Pertinent positives and negatives as noted otherwise comprehensive review is negative. Physical Examination:    Visit Vitals    /70 (BP 1 Location: Left arm, BP Patient Position: Sitting)    Pulse 66    Temp 97.4 °F (36.3 °C) (Oral)    Resp 18    Ht 5' 1\" (1.549 m)    Wt 68 kg (150 lb)    SpO2 99%    BMI 28.34 kg/m2     General:  Well defined, nourished, and groomed individual in no acute distress. Neck: Supple, nontender, no bruits. Heart: Regular rate and rhythm. Lungs:  Clear to auscultation bilaterally with equal chest expansion, no cough, no wheeze  Musculoskeletal:  Extremities revealed no edema. Psych: Appropriate mood and affect. Neurological Examination:    Today's date: 2018, July 27, Friday  Location: [facility], 1st floor, doctor's office  Current president: Sedonia Pallas  Previous president: Nancie  Recognition: 3/3  Repeat back:  2/3  3 step command: intact  World backwards- intact  How many quarters in $1.50: 6    Mental Status:   Alert and oriented to person, place, and time. Remote memory intact. Recall 2/3. Attention span and concentration are normal.  She is conversant. Her speech is clear. Cranial Nerves:    II, III, IV, VI:  Visual acuity grossly intact. Visual fields are normal.    Pupils are equal and round. Extra-ocular movements are full and fluid. No ptosis or nystagmus. V-XII: Hearing is grossly intact. Facial features are symmetric. The palate rises symmetrically and the tongue protrudes midline. Sternocleidomastoids 5/5. Motor Examination: Normal tone, bulk, and good strength in the upper and lower extremities. No cogwheel rigidity or clonus present. Sensory exam: Deferred. Coordination:  Finger to nose was normal.   No resting or intention tremor    Gait and Station:  Steady gait. Normal arm swing. No Rhomberg or pronator drift. No muscle wasting or fasiculations noted. Reflexes:  DTRs symmetrical throughout. Toes downgoing.       Impression/Plan  [facility] is a 77 y.o. female whose history and physical are consistent with chief complaint of memory loss. Patient presents for evaluation of memory loss. This has been a chronic problem over the past several years. She endorses this has been going on since 2012. She last was seen by [facility] neurology with neurologist Dr. Jeromy Rivas. Documentation available in the care everywhere tab. Documentation to include a recent neuropsychology evaluation from March of 2018 consistent with a mild cognitive impairment. Report consisting of assessment: \"In sum, current findings are most consistent with mild cognitive impairment (MCI). In addition to improved cognitive performance, she notes increased independence in activities of daily living through the use of a more structured system and having a family member check in to ensure accuracy. Since previous testing she was diagnosed with sleep apnea and is reportedly compliant with CPAP, which may account for some of the improvements on testing. I do not suspect a background neurodegenerative process in light of improvement. Rather, I suspect her cognitive weaknesses are related to multiple factors. She continues with severe depression and anxiety though hallucinations have subsided. She described chronic insomnia which can effect cognitive efficiency. Neuroimaging shows vascular changes which could play a role in her performance. Additionally, she is prescribed several medications which can have a negative impact on cognitive function (amitryptyline, Xanax). \"    At this time I would not repeat neuropsychology evaluation. We did discuss that if symptoms change or clinical picture warrants evaluation could be repeated in 18 months. Patient verbalized understanding. Further review of care everywhere tab includes imaging of the head CT from November 2017 with no acute intracranial processes. Mention of nonspecific white matter changes.   There is also an MRI of the brain from August 2016 consistent with \"atrophy and chronic small vessel changes with old areas of infarction, as described above and without significant interval change. Otherwise, unremarkable evaluation.  Specifically, no acute intracranial abnormalities are identified. \"  I would defer additional studies at this time. Patient recently had TSH evaluated WNL. Patient endorses taking B12 supplement. Discussed she is not a candidate at this time for prescriptive therapy including donepezil or Namenda. At this juncture we discussed safety. She endorses having adequate help at home with visiting nurse service. It was recommended by previous neurologist that she should not be driving. She presents in office today with no family members and drove herself. Denies trouble driving. We discussed the importance of management of her insomnia and depression. Endorses compliance with her CPAP. We discussed the importance of doing something daily to keep her mind active. She says that she likes to play games on her phone. She mentions headaches and that she takes an over-the-counter supplement that helps sometimes. No routine follow-up recommended at this time. Diagnoses and all orders for this visit:    1. Memory loss      Signed By: Nicolle Pugh NP    This note will not be viewable in [address] This note was created using voice recognition software. Despite editing, there may be syntax errors.

## 2018-07-27 NOTE — PATIENT INSTRUCTIONS
At this time no additional recommended evaluation for memory loss. Reviewed recent neuropsychology report from March of this year. Consideration for repeat testing recommended in 18 months. Recent MRI reviewed and do not wish to order additional imaging/testing at this time. No routine follow up at this time. Preventing Falls: After Your Visit   Your Care Instructions   Getting around your home safely can be a challenge if you have injuries or health problems that make it easy for you to fall. Loose rugs and furniture in walkways are among the dangers for many older people who have problems walking or who have poor eyesight. People who have conditions such as arthritis, osteoporosis, or dementia also have to be careful not to fall. You can make your home safer with a few simple measures. Follow-up care is a key part of your treatment and safety. Be sure to make and go to all appointments, and call your doctor if you are having problems. It's also a good idea to know your test results and keep a list of the medicines you take. How can you care for yourself at home? Taking care of yourself   You may get dizzy if you do not drink enough water. To prevent dehydration, drink plenty of fluids, enough so that your urine is light yellow or clear like water. Choose water and other caffeine-free clear liquids. If you have kidney, heart, or liver disease and have to limit fluids, talk with your doctor before you increase the amount of fluids you drink. Exercise regularly to improve your strength, muscle tone, and balance. Walk if you can. Swimming may be a good choice if you cannot walk easily. Have your vision and hearing checked each year or any time you notice a change. If you have trouble seeing and hearing, you might not be able to avoid objects and could lose your balance. Know the side effects of the medicines you take.  Ask your doctor or pharmacist whether the medicines you take can affect your balance. Sleeping pills or sedatives can affect your balance. Limit the amount of alcohol you drink. Alcohol can impair your balance and other senses. Ask your doctor whether calluses or corns on your feet need to be removed. If you wear loose-fitting shoes because of calluses or corns, you can lose your balance and fall. Talk to your doctor if you have numbness in your feet. Preventing falls at home   Remove raised doorway thresholds, throw rugs, and clutter. Repair loose carpet or raised areas in the floor. Move furniture and electrical cords to keep them out of walking paths. Use nonskid floor wax, and wipe up spills right away, especially on ceramic tile floors. If you use a walker or cane, put rubber tips on it. If you use crutches, clean the bottoms of them regularly with an abrasive pad, such as steel wool. Keep your house well lit, especially stairways, porches, and outside walkways. Use night-lights in areas such as hallways and bathrooms. Add extra light switches or use remote switches (such as switches that go on or off when you clap your hands) to make it easier to turn lights on if you have to get up during the night. Install sturdy handrails on stairways. Move items in your cabinets so that the things you use a lot are on the lower shelves (about waist level). Keep a cordless phone and a flashlight with new batteries by your bed. If possible, put a phone in each of the main rooms of your house, or carry a cell phone in case you fall and cannot reach a phone. Or, you can wear a device around your neck or wrist. You push a button that sends a signal for help. Wear low-heeled shoes that fit well and give your feet good support. Use footwear with nonskid soles. Check the heels and soles of your shoes for wear. Repair or replace worn heels or soles. Do not wear socks without shoes on wood floors. Walk on the grass when the sidewalks are slippery.  If you live in an area that gets snow and ice in the winter, sprinkle salt on slippery steps and sidewalks. Preventing falls in the bath   Install grab bars and nonskid mats inside and outside your shower or tub and near the toilet and sinks. Use shower chairs and bath benches. Use a hand-held shower head that will allow you to sit while showering. Get into a tub or shower by putting the weaker leg in first. Get out of a tub or shower with your strong side first.   Repair loose toilet seats and consider installing a raised toilet seat to make getting on and off the toilet easier. Keep your bathroom door unlocked while you are in the shower. Where can you learn more? Go to hovelstay.be   Enter G117 in the search box to learn more about \"Preventing Falls: After Your Visit. \"    © 7376-5139 Healthwise, Incorporated. Care instructions adapted under license by [employer] (which disclaims liability or warranty for this information). This care instruction is for use with your licensed healthcare professional. If you have questions about a medical condition or this instruction, always ask your healthcare professional. [place] [address] any warranty or liability for your use of this information. Content Version: 10.1.578890; Current as of:  May 15, 2013

## 2018-08-20 ENCOUNTER — OFFICE VISIT (OUTPATIENT)
Dept: FAMILY MEDICINE CLINIC | Age: 68
End: 2018-08-20

## 2018-08-20 VITALS
SYSTOLIC BLOOD PRESSURE: 138 MMHG | WEIGHT: 151.2 LBS | HEIGHT: 61 IN | OXYGEN SATURATION: 93 % | BODY MASS INDEX: 28.55 KG/M2 | TEMPERATURE: 98.1 F | DIASTOLIC BLOOD PRESSURE: 74 MMHG | RESPIRATION RATE: 14 BRPM | HEART RATE: 72 BPM

## 2018-08-20 DIAGNOSIS — D63.8 ANEMIA OF CHRONIC DISEASE: ICD-10-CM

## 2018-08-20 DIAGNOSIS — N32.81 OVERACTIVE BLADDER: ICD-10-CM

## 2018-08-20 DIAGNOSIS — E11.9 TYPE 2 DIABETES MELLITUS WITHOUT COMPLICATION, WITHOUT LONG-TERM CURRENT USE OF INSULIN (HCC): Primary | ICD-10-CM

## 2018-08-20 DIAGNOSIS — E78.00 PURE HYPERCHOLESTEROLEMIA: ICD-10-CM

## 2018-08-20 DIAGNOSIS — F33.0 MILD EPISODE OF RECURRENT MAJOR DEPRESSIVE DISORDER (HCC): ICD-10-CM

## 2018-08-20 LAB — HBA1C MFR BLD HPLC: 6.1 %

## 2018-08-20 NOTE — MR AVS SNAPSHOT
[facility] [address] 
[phone] Patient: Theresa Todd MRN: [id number] [facility]:[id number]/49/[id number] Visit Information Date & Time Provider Department Dept. Phone Encounter #  
 8/20/2018 10:15 AM Carlos Ingram, [address] [place] [address] Road [id number] Follow-up Instructions Return in about 3 months (around 11/20/2018), or if symptoms worsen or fail to improve. Upcoming Health Maintenance Date Due ZOSTER VACCINE AGE 60> 3/21/2008 Influenza Age 5 to Adult 8/1/2018 HEMOGLOBIN A1C Q6M 8/12/2018 MICROALBUMIN Q1 2/12/2019 FOOT EXAM Q1 4/10/2019 MEDICARE YEARLY EXAM 4/21/2019 EYE EXAM RETINAL OR DILATED Q1 5/21/2019 LIPID PANEL Q1 6/13/2019 BREAST CANCER SCRN MAMMOGRAM 4/23/2020 GLAUCOMA SCREENING Q2Y 5/21/2020 DTaP/Tdap/Td series (2 - Td) 7/29/2024 COLONOSCOPY 11/5/2024 Allergies as of 8/20/2018  Review Complete On: 8/20/2018 By: Carlos Ingram MD  
  
 Severity Noted Reaction Type Reactions Iodine  06/11/2010    Swelling Nabumetone  06/11/2010    Swelling Penicillins  06/11/2010    Unknown (comments) Shellfish Containing Products  06/11/2010    Swelling Sulfa (Sulfonamide Antibiotics)  06/11/2010    Swelling Current Immunizations  Reviewed on 2/14/2018 Name Date Pneumococcal Conjugate (PCV-13) 6/23/2016 Pneumococcal Vaccine (Unspecified Type) 10/3/2017 Tdap 7/29/2014 12:00 AM  
  
 Not reviewed this visit You Were Diagnosed With   
  
 Codes Comments Type 2 diabetes mellitus without complication, without long-term current use of insulin (HCC)    -  Primary ICD-10-CM: E11.9 ICD-9-CM: 250.00 Pure hypercholesterolemia     ICD-10-CM: E78.00 ICD-9-CM: 272.0 Overactive bladder     ICD-10-CM: N32.81 ICD-9-CM: 596.51 Anemia of chronic disease     ICD-10-CM: D63.8 ICD-9-CM: 285.29   
 Mild episode of recurrent major depressive disorder (HCC)     ICD-10-CM: F33.0 ICD-9-CM: 296.31 Vitals BP Pulse Temp Resp Height(growth percentile) Weight(growth percentile) 138/74 (BP 1 Location: Left arm, BP Patient Position: Sitting) 72 98.1 °F (36.7 °C) (Oral) 14 5' 1\" (1.549 m) 151 lb 3.2 oz (68.6 kg) SpO2 BMI OB Status Smoking Status 93% 28.57 kg/m2 Hysterectomy Former Smoker Vitals History BMI and BSA Data Body Mass Index Body Surface Area 28.57 kg/m 2 1.72 m 2 Preferred Pharmacy Pharmacy Name Phone [address], [address] [facility] Your Updated Medication List  
  
   
This list is accurate as of 8/20/18 10:57 AM.  Always use your most recent med list.  
  
  
  
  
 aspirin delayed-release 81 mg tablet Take  by mouth daily. atorvastatin 10 mg tablet Commonly known as:  LIPITOR Take 1 Tab by mouth daily. BREO ELLIPTA 200-25 mcg/dose inhaler Generic drug:  fluticasone-vilanterol Take 1 Puff by inhalation daily. cholecalciferol 400 unit Tab tablet Commonly known as:  VITAMIN D3 Take  by mouth daily. cyanocobalamin 500 mcg tablet Commonly known as:  VITAMIN B12 Take 500 mcg by mouth daily. cyclobenzaprine 5 mg tablet Commonly known as:  FLEXERIL Take 5 mg by mouth three (3) times daily as needed for Muscle Spasm(s). cycloSPORINE 0.05 % ophthalmic emulsion Commonly known as:  RESTASIS Instill 1 Drop in affected eye(s) Every 12 hours. docusate sodium 50 mg capsule Commonly known as:  Doc Peg Take 50 mg by mouth two (2) times a day. fluticasone 27.5 mcg/actuation nasal spray Commonly known as:  VERAMYST Spray  in Nose. fluticasone 50 mcg/actuation inhaler Commonly known as:  FLOVENT DISKUS Take  by inhalation. gabapentin 100 mg capsule Commonly known as:  NEURONTIN  
100 mg two (2) times a day. HYDROcodone-acetaminophen 5-325 mg per tablet Commonly known as:  NORCO  
  
 ipratropium-albuterol  mcg/actuation inhaler Commonly known as:  Keyla Margot Take 1 inhalation inhaled by mouth Take As Needed. metFORMIN  mg tablet Commonly known as:  GLUCOPHAGE XR  
TAKE 2 TABLETS BY MOUTH ONCE DAILY WITH  DINNER  
  
 MIGRELIEF PO Take  by mouth.  
  
 montelukast 10 mg tablet Commonly known as:  SINGULAIR Take 1 Tab by mouth daily. oxybutynin 5 mg tablet Commonly known as:  XIZGWEON Take 1 Tab by mouth two (2) times a day. pantoprazole 40 mg tablet Commonly known as:  PROTONIX  
TAKE 1 TABLET BY MOUTH ONCE DAILY prednisoLONE acetate 1 % ophthalmic suspension Commonly known as:  PRED FORTE Administer 1 Drop to both eyes four (4) times daily. sertraline 100 mg tablet Commonly known as:  ZOLOFT Take 1 Tab by mouth daily. SITagliptin 50 mg tablet Commonly known as:  Eloy Oats 50 mg.  
  
 sodium chloride 0.65 % nasal squeeze bottle Commonly known as:  OCEAN  
1 Spray as needed for Congestion. We Performed the Following AMB POC HEMOGLOBIN A1C [47270 CPT(R)] Follow-up Instructions Return in about 3 months (around 11/20/2018), or if symptoms worsen or fail to improve. To-Do List   
 11/20/2018 Lab:  HEMOGLOBIN A1C W/O EAG   
  
 11/20/2018 Lab:  METABOLIC PANEL, COMPREHENSIVE Patient Instructions Learning About Diabetes Food Guidelines Your Care Instructions Meal planning is important to manage diabetes. It helps keep your blood sugar at a target level (which you set with your doctor). You don't have to eat special foods. You can eat what your family eats, including sweets once in a while. But you do have to pay attention to how often you eat and how much you eat of certain foods.  
You may want to work with a dietitian or a certified diabetes educator (CDE) to help you plan meals and snacks. A dietitian or CDE can also help you lose weight if that is one of your goals. What should you know about eating carbs? Managing the amount of carbohydrate (carbs) you eat is an important part of healthy meals when you have diabetes. Carbohydrate is found in many foods. · Learn which foods have carbs. And learn the amounts of carbs in different foods. ¨ Bread, cereal, pasta, and rice have about 15 grams of carbs in a serving. A serving is 1 slice of bread (1 ounce), ½ cup of cooked cereal, or 1/3 cup of cooked pasta or rice. ¨ Fruits have 15 grams of carbs in a serving. A serving is 1 small fresh fruit, such as an apple or orange; ½ of a banana; ½ cup of cooked or canned fruit; ½ cup of fruit juice; 1 cup of melon or raspberries; or 2 tablespoons of dried fruit. ¨ Milk and no-sugar-added yogurt have 15 grams of carbs in a serving. A serving is 1 cup of milk or 2/3 cup of no-sugar-added yogurt. ¨ Starchy vegetables have 15 grams of carbs in a serving. A serving is ½ cup of mashed potatoes or sweet potato; 1 cup winter squash; ½ of a small baked potato; ½ cup of cooked beans; or ½ cup cooked corn or green peas. · Learn how much carbs to eat each day and at each meal. A dietitian or CDE can teach you how to keep track of the amount of carbs you eat. This is called carbohydrate counting. · If you are not sure how to count carbohydrate grams, use the Plate Method to plan meals. It is a good, quick way to make sure that you have a balanced meal. It also helps you spread carbs throughout the day. ¨ Divide your plate by types of foods. Put non-starchy vegetables on half the plate, meat or other protein food on one-quarter of the plate, and a grain or starchy vegetable in the final quarter of the plate.  To this you can add a small piece of fruit and 1 cup of milk or yogurt, depending on how many carbs you are supposed to eat at a meal. 
 · Try to eat about the same amount of carbs at each meal. Do not \"save up\" your daily allowance of carbs to eat at one meal. 
· Proteins have very little or no carbs per serving. Examples of proteins are beef, chicken, turkey, fish, eggs, tofu, cheese, cottage cheese, and peanut butter. A serving size of meat is 3 ounces, which is about the size of a deck of cards. Examples of meat substitute serving sizes (equal to 1 ounce of meat) are 1/4 cup of cottage cheese, 1 egg, 1 tablespoon of peanut butter, and ½ cup of tofu. How can you eat out and still eat healthy? · Learn to estimate the serving sizes of foods that have carbohydrate. If you measure food at home, it will be easier to estimate the amount in a serving of restaurant food. · If the meal you order has too much carbohydrate (such as potatoes, corn, or baked beans), ask to have a low-carbohydrate food instead. Ask for a salad or green vegetables. · If you use insulin, check your blood sugar before and after eating out to help you plan how much to eat in the future. · If you eat more carbohydrate at a meal than you had planned, take a walk or do other exercise. This will help lower your blood sugar. What else should you know? · Limit saturated fat, such as the fat from meat and dairy products. This is a healthy choice because people who have diabetes are at higher risk of heart disease. So choose lean cuts of meat and nonfat or low-fat dairy products. Use olive or canola oil instead of butter or shortening when cooking. · Don't skip meals. Your blood sugar may drop too low if you skip meals and take insulin or certain medicines for diabetes. · Check with your doctor before you drink alcohol. Alcohol can cause your blood sugar to drop too low. Alcohol can also cause a bad reaction if you take certain diabetes medicines. Follow-up care is a key part of your treatment and safety.  Be sure to make and go to all appointments, and call your doctor if you are having problems. It's also a good idea to know your test results and keep a list of the medicines you take. Where can you learn more? Go to http://danika-sophia.info/. Enter L823 in the search box to learn more about \"Learning About Diabetes Food Guidelines. \" Current as of: December 7, 2017 Content Version: 11.7 © 2640-1303 [employer]. Care instructions adapted under license by [employer] (which disclaims liability or warranty for this information). If you have questions about a medical condition or this instruction, always ask your healthcare professional. [address] any warranty or liability for your use of this information. Introducing [facility]! Timo Ho introduces Powered Now patient portal. Now you can access parts of your medical record, email your doctor's office, and request medication refills online. 1. In your internet browser, go to [email or web address] 2. Click on the First Time User? Click Here link in the Sign In box. You will see the New Member Sign Up page. 3. Enter your Powered Now Access Code exactly as it appears below. You will not need to use this code after youve completed the sign-up process. If you do not sign up before the expiration date, you must request a new code. · Powered Now Access Code: [email or web address] Expires: 10/25/2018  9:11 AM 
 
4. Enter the last four digits of your Social Security Number (xxxx) and Date of Birth (mm/dd/yyyy) as indicated and click Submit. You will be taken to the next sign-up page. 5. Create a Powered Now ID. This will be your Powered Now login ID and cannot be changed, so think of one that is secure and easy to remember. 6. Create a Powered Now password. You can change your password at any time. 7. Enter your Password Reset Question and Answer.  This can be used at a later time if you forget your password. 8. Enter your e-mail address. You will receive e-mail notification when new information is available in [address] 9. Click Sign Up. You can now view and download portions of your medical record. 10. Click the Download Summary menu link to download a portable copy of your medical information. If you have questions, please visit the Frequently Asked Questions section of the [employer] website. Remember, [employer] is NOT to be used for urgent needs. For medical emergencies, dial 911. Now available from your iPhone and Android! Please provide this summary of care documentation to your next provider. Your primary care clinician is listed as Alban Adame. If you have any questions after today's visit, please call [phone].

## 2018-08-20 NOTE — PATIENT INSTRUCTIONS

## 2018-08-20 NOTE — PROGRESS NOTES
1. Have you been to the ER, urgent care clinic since your last visit? Hospitalized since your last visit? No    2. Have you seen or consulted any other health care providers outside of the [address] [place] [address] since your last visit? Include any pap smears or colon screening.  No      Chief Complaint   Patient presents with    Depression    Diabetes    Cholesterol Problem    GERD    Asthma

## 2018-08-20 NOTE — PROGRESS NOTES
Theresa Todd, 69 y.o.,  female    SUBJECTIVE  Ff-up    Depression- reports improvement in increased dose of zoloft. Reports  More energy, able to focus. She is dealing with all the paper work process after husbands passing, keeping her busy. Reviewed neuro consult, for memory loss. No unsafe behavior. Mild cognitive impairment with Neuropsych testing 2018 and chronic atrophic changes on head CT. No indication for medication at this time, prn visits. NIDDM-taking metformin and januvia. denies hypoglycemia. HL- taking lipitor    OAB- reports recently started on oxybutynin and responding well to this. Chronic anemia- says past few years has developed this, on po fe. Denies fatigue, pica, melena. normal EGD/colonoscopy 2014. Previous iron profile more c/w ACD. Trial off po fe for the past few months with stable hgb level     GERD- on prilosec    Chronic neck pain with radiculopathy- reports following dr. Az Corado for this, currently on gabapentin/flexeril and norco prn. 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 Asthma-doing well she says, on ICS and LAMA for maintenance,  following Dr. Gege Castro  CHRIS on CPAP- reports consistent use,  following  Dr. Madison Pena    ROS:  See HPI, all others negative    ROS random episodes of chills, no wt loss/appetite changes, no hot flashes/night sweates/ fever    Patient Active Problem List   Diagnosis Code    Pure hypercholesterolemia E78.00    Overactive bladder N32.81    Chronic allergic rhinitis J30.9    Anemia H12.3    Uncomplicated asthma N25.149    Type 2 diabetes mellitus without complication, without long-term current use of insulin (HCC) E11.9       Current Outpatient Prescriptions   Medication Sig Dispense Refill    pantoprazole (PROTONIX) 40 mg tablet Take 1 Tab by mouth daily. 90 Tab 0    metFORMIN ER (GLUCOPHAGE XR) 500 mg tablet Take 2 Tabs by mouth daily (with dinner).  180 Tab 0    montelukast (SINGULAIR) 10 mg tablet Take 1 Tab by mouth daily. 90 Tab 2    cyclobenzaprine (FLEXERIL) 5 mg tablet Take 5 mg by mouth three (3) times daily as needed for Muscle Spasm(s).  aspirin delayed-release 81 mg tablet Take  by mouth daily.  cyanocobalamin (VITAMIN B12) 500 mcg tablet Take 500 mcg by mouth daily.  fluticasone-vilanterol (BREO ELLIPTA) 200-25 mcg/dose inhaler Take 1 Puff by inhalation daily.  atorvastatin (LIPITOR) 10 mg tablet Take 1 Tab by mouth daily. 90 Tab 0    oxybutynin (DITROPAN) 5 mg tablet Take 1 Tab by mouth two (2) times a day. 60 Tab 0    SITagliptin (JANUVIA) 50 mg tablet 50 mg.      gabapentin (NEURONTIN) 100 mg capsule 100 mg.      fluticasone (VERAMYST) 27.5 mcg/actuation nasal spray [place]  in Nose.  cycloSPORINE (RESTASIS) 0.05 % ophthalmic emulsion Instill 1 Drop in affected eye(s) Every 12 hours.  HYDROcodone-acetaminophen (NORCO) 5-325 mg per tablet   0    ipratropium-albuterol (COMBIVENT RESPIMAT)  mcg/actuation inhaler Take 1 inhalation inhaled by mouth Take As Needed.  ferrous sulfate 325 mg (65 mg iron) tablet 325 mg.      mometasone-formoterol (DULERA) 100-5 mcg/actuation HFA inhaler Take 2 Puffs by inhalation two (2) times a day.          Allergies   Allergen Reactions    Iodine Swelling    Nabumetone Swelling    Penicillins Unknown (comments)    Shellfish Containing Products Swelling    Sulfa (Sulfonamide Antibiotics) Swelling       Past Medical History:   Diagnosis Date    Allergic rhinitis     Anemia     Asthma     Candida vaginitis     CKD (chronic kidney disease) stage 3, GFR 30-59 ml/min     COPD (chronic obstructive pulmonary disease) (HCC)     Diabetes (HCC)     Diabetes ([facility])     GERD (gastroesophageal reflux disease)     h pylori gastritis egd 11/14 dr Anat Olmedo    Headache     Hx of colonoscopy 11/05/2014    normal rpt 10 years, 11/2024, dr. Anat Olmedo    Hypercholesterolemia     Mild dementia     OAB (overactive bladder)     CHRIS on CPAP     Radiculopathy     Urinary incontinence     UTI (urinary tract infection)        Social History     Social History    Marital status:      Spouse name: N/A    Number of children: N/A    Years of education: N/A     Occupational History    Not on file. Social History Main Topics    Smoking status: Former Smoker     Years: 20.00    Smokeless tobacco: Former User     Quit date: 1/1/1999    Alcohol use Yes    Drug use: No    Sexual activity: Not Currently     Partners: Male     Other Topics Concern    Not on file     Social History Narrative       History reviewed. No pertinent family history.       OBJECTIVE    Physical Exam:     Visit Vitals    /74 (BP 1 Location: Left arm, BP Patient Position: Sitting)    Pulse 72    Temp 98.1 °F (36.7 °C) (Oral)    Resp 14    Ht 5' 1\" (1.549 m)    Wt 151 lb 3.2 oz (68.6 kg)    SpO2 93%    BMI 28.57 kg/m2       General: alert, AA, in no apparent distress or pain  CVS: normal rate, regular rhythm, distinct S1 and S2  Lungs:clear to ausculation bilaterally, no crackles, wheezing or rhonchi noted  Skin: + posterior ankle hyperpigmentation  Psych:  mood and affect normal    Results for orders placed or performed in visit on 08/20/18   AMB POC HEMOGLOBIN A1C   Result Value Ref Range    Hemoglobin A1c (POC) 6.1 %         ASSESSMENT/PLAN  Diagnoses and all orders for this visit:    Major depression  Stable, cont zoloft     Type 2 diabetes mellitus without complication, without long-term current use of insulin (HCC)  Controlled  Cont current regimen  Eye exam- 5/18  Foot exam- 2/18  Microalbumin 5/18  Lipid panel 2/18  a1c 8/18  Check cmp/a1c in 3 months     Anemia of chronic disease  Monitoring  Normal GI work up 2014  Iron profile c/w anemia of chronic disease     Pure hypercholesterolemia  Fairly good range on  lipitor     Overactive bladder  Responding to ditropan, to cont    Hyperpigmentation  Referral to derm    Mild cognitive impairment  Supportive treatment  Neuro consult appreciated, no indication for med or further testing, prn visits    BMI 28  Encouraged wt loss    Ff-up in 3 months or sooner prn    Patient understands plan of care. Patient has provided input and agrees with goals.

## 2018-10-25 RX ORDER — GABAPENTIN 100 MG/1
100 CAPSULE ORAL 2 TIMES DAILY
OUTPATIENT
Start: 2018-10-25

## 2018-10-25 RX ORDER — PANTOPRAZOLE SODIUM 40 MG/1
TABLET, DELAYED RELEASE ORAL
Qty: 90 TAB | Refills: 0 | OUTPATIENT
Start: 2018-10-25

## 2018-10-25 NOTE — TELEPHONE ENCOUNTER
This patient contacted office for the following prescriptions to be filled:    Medication requested :   Requested Prescriptions     Pending Prescriptions Disp Refills    pantoprazole (PROTONIX) 40 mg tablet [Pharmacy Med Name: PANTOPRAZOLE SOD 40MG TAB] 90 Tab 0     Sig: TAKE 1 TABLET BY MOUTH ONCE DAILY    gabapentin (NEURONTIN) 100 mg capsule       Si Cap two (2) times a day.    (Neurontin has not been prescribed by Dr. Wayne Lange previously, she states she used to get it from her previous PCP)  PCP: Dr. Noe Fillers or Print: [address] [place] [address]  Mail order or Local pharmacy: [phone]    Scheduled appointment if not seen by current providers in office: Vanessa Sellers 18,next appt 18

## 2018-10-31 NOTE — TELEPHONE ENCOUNTER
Contacted patient and verified identity using name and date of birth (2- identifiers). Patient advised to request neurontin medication from spine doctor. Patient voiced understanding. Request for refill of Protonix medication.

## 2018-11-01 RX ORDER — PANTOPRAZOLE SODIUM 40 MG/1
40 TABLET, DELAYED RELEASE ORAL DAILY
Qty: 90 TAB | Refills: 0 | Status: SHIPPED | OUTPATIENT
Start: 2018-11-01 | End: 2019-02-07 | Stop reason: SDUPTHER

## 2018-11-13 ENCOUNTER — HOSPITAL ENCOUNTER (OUTPATIENT)
Dept: LAB | Age: 68
Discharge: HOME | End: 2018-11-13
Payer: MEDICARE

## 2018-11-13 ENCOUNTER — HOSPITAL ENCOUNTER (OUTPATIENT)
Dept: GENERAL RADIOLOGY | Age: 68
End: 2018-11-13
Payer: MEDICARE

## 2018-11-13 DIAGNOSIS — E11.9 TYPE 2 DIABETES MELLITUS WITHOUT COMPLICATION, WITHOUT LONG-TERM CURRENT USE OF INSULIN (HCC): ICD-10-CM

## 2018-11-13 LAB
ALBUMIN SERPL-MCNC: 3.6 G/DL (ref 3.4–5)
ALBUMIN/GLOB SERPL: 1.1 {RATIO} (ref 0.8–1.7)
ALP SERPL-CCNC: 74 U/L (ref 45–117)
ALT SERPL-CCNC: 14 U/L (ref 13–56)
ANION GAP SERPL CALC-SCNC: 8 MMOL/L (ref 3–18)
AST SERPL-CCNC: 14 U/L (ref 15–37)
BILIRUB SERPL-MCNC: 0.3 MG/DL (ref 0.2–1)
BUN SERPL-MCNC: 21 MG/DL (ref 7–18)
BUN/CREAT SERPL: 13 (ref 12–20)
CALCIUM SERPL-MCNC: 8.9 MG/DL (ref 8.5–10.1)
CHLORIDE SERPL-SCNC: 109 MMOL/L (ref 100–108)
CO2 SERPL-SCNC: 28 MMOL/L (ref 21–32)
CREAT SERPL-MCNC: 1.62 MG/DL (ref 0.6–1.3)
GLOBULIN SER CALC-MCNC: 3.2 G/DL (ref 2–4)
GLUCOSE SERPL-MCNC: 105 MG/DL (ref 74–99)
HBA1C MFR BLD: 6.4 % (ref 4.2–5.6)
POTASSIUM SERPL-SCNC: 3.7 MMOL/L (ref 3.5–5.5)
PROT SERPL-MCNC: 6.8 G/DL (ref 6.4–8.2)
SODIUM SERPL-SCNC: 145 MMOL/L (ref 136–145)

## 2018-11-13 PROCEDURE — 36415 COLL VENOUS BLD VENIPUNCTURE: CPT

## 2018-11-13 PROCEDURE — 80053 COMPREHEN METABOLIC PANEL: CPT

## 2018-11-13 PROCEDURE — 83036 HEMOGLOBIN GLYCOSYLATED A1C: CPT

## 2018-11-19 RX ORDER — SERTRALINE HYDROCHLORIDE 100 MG/1
TABLET, FILM COATED ORAL
Qty: 90 TAB | Refills: 0 | Status: SHIPPED | OUTPATIENT
Start: 2018-11-19 | End: 2019-01-03 | Stop reason: SDUPTHER

## 2018-11-20 NOTE — PROGRESS NOTES
Diabetes is well controlled, however kidney function is worsening  Will discuss on next visit. pls notify pt.

## 2018-11-26 ENCOUNTER — OFFICE VISIT (OUTPATIENT)
Dept: FAMILY MEDICINE CLINIC | Age: 68
End: 2018-11-26

## 2018-11-26 VITALS
SYSTOLIC BLOOD PRESSURE: 142 MMHG | TEMPERATURE: 97.9 F | HEART RATE: 74 BPM | BODY MASS INDEX: 27.56 KG/M2 | DIASTOLIC BLOOD PRESSURE: 72 MMHG | RESPIRATION RATE: 16 BRPM | OXYGEN SATURATION: 98 % | HEIGHT: 61 IN | WEIGHT: 146 LBS

## 2018-11-26 DIAGNOSIS — N32.81 OVERACTIVE BLADDER: ICD-10-CM

## 2018-11-26 DIAGNOSIS — E78.00 PURE HYPERCHOLESTEROLEMIA: ICD-10-CM

## 2018-11-26 DIAGNOSIS — D63.8 ANEMIA OF CHRONIC DISEASE: ICD-10-CM

## 2018-11-26 DIAGNOSIS — R79.89 ELEVATED SERUM CREATININE: Primary | ICD-10-CM

## 2018-11-26 DIAGNOSIS — E11.9 TYPE 2 DIABETES MELLITUS WITHOUT COMPLICATION, WITHOUT LONG-TERM CURRENT USE OF INSULIN (HCC): ICD-10-CM

## 2018-11-26 NOTE — PATIENT INSTRUCTIONS

## 2018-11-26 NOTE — PROGRESS NOTES
1. Have you been to the ER, urgent care clinic since your last visit? Hospitalized since your last visit? No    2. Have you seen or consulted any other health care providers outside of the [address] [place] [address] since your last visit? Include any pap smears or colon screening.  No    Chief Complaint   Patient presents with    Lack Of Coordination     times 2 weeks    Depression    Diabetes    Cholesterol Problem    LOW BACK PAIN     times 3 weeks

## 2018-11-27 NOTE — PROGRESS NOTES
Opal Gallardo, 69 y.o.,  female    SUBJECTIVE  Ff-up    Depression-she says she is doing well, taking zoloft. previous neuro consult for memory loss note no unsafe behavior. Mild cognitive impairment with Neuropsych testing 2018 and chronic atrophic changes on head CT. No indication for medication at this time, prn visits. NIDDM-taking metformin and januvia. denies hypoglycemia. Reviewed labs, creatinine trending up. Denies nsaid use. She reports to be mainly  Taking gabapentin for feet neuropathy and not back pain. She has been off this medication for several months without appreciable change in symptoms. HL- taking lipitor    OAB- reports recently started on oxybutynin and responding well to this. GERD- on prilosec. Has h/o anemia of chronic disease that has been stable. Chronic neck pain with radiculopathy- reports following dr. Tawnya Mina for this, currently on flexeril and norco prn. 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 Asthma-doing well she says, on ICS and LAMA for maintenance,  following Dr. Soumya Zaman  CHRIS on CPAP- reports consistent use,  following  Dr. Henrietta Cordero    ROS:  See HPI, all others negative    ROS random episodes of chills, no wt loss/appetite changes, no hot flashes/night sweates/ fever    Patient Active Problem List   Diagnosis Code    Pure hypercholesterolemia E78.00    Overactive bladder N32.81    Chronic allergic rhinitis J30.9    Anemia D37.7    Uncomplicated asthma U51.170    Type 2 diabetes mellitus without complication, without long-term current use of insulin ([facility]) E11.9       Current Outpatient Prescriptions   Medication Sig Dispense Refill    pantoprazole (PROTONIX) 40 mg tablet Take 1 Tab by mouth daily. 90 Tab 0    metFORMIN ER (GLUCOPHAGE XR) 500 mg tablet Take 2 Tabs by mouth daily (with dinner). 180 Tab 0    montelukast (SINGULAIR) 10 mg tablet Take 1 Tab by mouth daily.  90 Tab 2    cyclobenzaprine (FLEXERIL) 5 mg tablet Take 5 mg by mouth three (3) times daily as needed for Muscle Spasm(s).  aspirin delayed-release 81 mg tablet Take  by mouth daily.  cyanocobalamin (VITAMIN B12) 500 mcg tablet Take 500 mcg by mouth daily.  fluticasone-vilanterol (BREO ELLIPTA) 200-25 mcg/dose inhaler Take 1 Puff by inhalation daily.  atorvastatin (LIPITOR) 10 mg tablet Take 1 Tab by mouth daily. 90 Tab 0    oxybutynin (DITROPAN) 5 mg tablet Take 1 Tab by mouth two (2) times a day. 60 Tab 0    SITagliptin (JANUVIA) 50 mg tablet 50 mg.      gabapentin (NEURONTIN) 100 mg capsule 100 mg.      fluticasone (VERAMYST) 27.5 mcg/actuation nasal spray [place]  in Nose.  cycloSPORINE (RESTASIS) 0.05 % ophthalmic emulsion Instill 1 Drop in affected eye(s) Every 12 hours.  HYDROcodone-acetaminophen (NORCO) 5-325 mg per tablet   0    ipratropium-albuterol (COMBIVENT RESPIMAT)  mcg/actuation inhaler Take 1 inhalation inhaled by mouth Take As Needed.  ferrous sulfate 325 mg (65 mg iron) tablet 325 mg.      mometasone-formoterol (DULERA) 100-5 mcg/actuation HFA inhaler Take 2 Puffs by inhalation two (2) times a day.          Allergies   Allergen Reactions    Iodine Swelling    Nabumetone Swelling    Penicillins Unknown (comments)    Shellfish Containing Products Swelling    Sulfa (Sulfonamide Antibiotics) Swelling       Past Medical History:   Diagnosis Date    Allergic rhinitis     Anemia     Asthma     Candida vaginitis     CKD (chronic kidney disease) stage 3, GFR 30-59 ml/min     COPD (chronic obstructive pulmonary disease) (HCC)     Diabetes (HCC)     Diabetes (HCC)     GERD (gastroesophageal reflux disease)     h pylori gastritis egd 11/14 dr Padmaja Short    Headache     Hx of colonoscopy 11/05/2014    normal rpt 10 years, 11/2024, dr. Padmaja Short    Hypercholesterolemia     Mild dementia     OAB (overactive bladder)     CHRIS on CPAP     Radiculopathy     Urinary incontinence     UTI (urinary tract infection)        Social History     Social History    Marital status:      Spouse name: N/A    Number of children: N/A    Years of education: N/A     Occupational History    Not on file. Social History Main Topics    Smoking status: Former Smoker     Years: 20.00    Smokeless tobacco: Former User     Quit date: 1/1/1999    Alcohol use Yes    Drug use: No    Sexual activity: Not Currently     Partners: Male     Other Topics Concern    Not on file     Social History Narrative       History reviewed. No pertinent family history. OBJECTIVE    Physical Exam:     Visit Vitals  /72 (BP 1 Location: Left arm, BP Patient Position: Sitting)   Pulse 74   Temp 97.9 °F (36.6 °C) (Oral)   Resp 16   Ht 5' 1\" (1.549 m)   Wt 146 lb (66.2 kg)   SpO2 98%   BMI 27.59 kg/m²       General: alert, AA, in no apparent distress or pain  CVS: normal rate, regular rhythm, distinct S1 and S2  Lungs:clear to ausculation bilaterally, no crackles, wheezing or rhonchi noted  Skin: + posterior ankle hyperpigmentation  Psych:  mood and affect normal    Results for orders placed or performed during the hospital encounter of 11/13/18   HEMOGLOBIN A1C W/O EAG   Result Value Ref Range    Hemoglobin A1c 6.4 (H) 4.2 - 5.6 %   METABOLIC PANEL, COMPREHENSIVE   Result Value Ref Range    Sodium 145 136 - 145 mmol/L    Potassium 3.7 3.5 - 5.5 mmol/L    Chloride 109 (H) 100 - 108 mmol/L    CO2 28 21 - 32 mmol/L    Anion gap 8 3.0 - 18 mmol/L    Glucose 105 (H) 74 - 99 mg/dL    BUN 21 (H) 7.0 - 18 MG/DL    Creatinine 1.62 (H) 0.6 - 1.3 MG/DL    BUN/Creatinine ratio 13 12 - 20      GFR est AA 38 (L) >60 ml/min/1.73m2    GFR est non-AA 31 (L) >60 ml/min/1.73m2    Calcium 8.9 8.5 - 10.1 MG/DL    Bilirubin, total 0.3 0.2 - 1.0 MG/DL    ALT (SGPT) 14 13 - 56 U/L    AST (SGOT) 14 (L) 15 - 37 U/L    Alk.  phosphatase 74 45 - 117 U/L    Protein, total 6.8 6.4 - 8.2 g/dL    Albumin 3.6 3.4 - 5.0 g/dL    Globulin 3.2 2.0 - 4.0 g/dL    A-G Ratio 1.1 0.8 - 1.7 ASSESSMENT/PLAN  Diagnoses and all orders for this visit:    Major depression  Stable, cont zoloft     Type 2 diabetes mellitus without complication, without long-term current use of insulin (HCC)  Controlled  Creatinine trending up  Will hold metformin, and cont [place] and [place]  Cont ADA diet  Will stop neurontin with no change in foot paresthesia symptoms and cont to monitor for now. Eye exam- 5/18  Foot exam- 2/18  Microalbumin 5/18  Lipid panel 2/18  a1c 11/18  Recheck BMP In 2 weeks     Anemia of chronic disease  Monitoring  Normal GI work up 2014  Iron profile c/w anemia of chronic disease     Pure hypercholesterolemia  Fairly good range on  lipitor     Overactive bladder  Responding to ditropan, to cont    Mild cognitive impairment  Supportive treatment  Neuro consult noted no indication for med or further testing, prn visits    Elevated serum creatinine  See above    BMI 27  Encouraged wt loss    Ff-up in 1 months or sooner prn    Patient understands plan of care. Patient has provided input and agrees with goals.

## 2018-12-17 ENCOUNTER — HOSPITAL ENCOUNTER (OUTPATIENT)
Dept: LAB | Age: 68
Discharge: HOME | End: 2018-12-17
Payer: MEDICARE

## 2018-12-17 DIAGNOSIS — R79.89 ELEVATED SERUM CREATININE: ICD-10-CM

## 2018-12-17 LAB
ANION GAP SERPL CALC-SCNC: 4 MMOL/L (ref 3–18)
BUN SERPL-MCNC: 11 MG/DL (ref 7–18)
BUN/CREAT SERPL: 8 (ref 12–20)
CALCIUM SERPL-MCNC: 9.1 MG/DL (ref 8.5–10.1)
CHLORIDE SERPL-SCNC: 112 MMOL/L (ref 100–108)
CO2 SERPL-SCNC: 30 MMOL/L (ref 21–32)
CREAT SERPL-MCNC: 1.44 MG/DL (ref 0.6–1.3)
GLUCOSE SERPL-MCNC: 99 MG/DL (ref 74–99)
POTASSIUM SERPL-SCNC: 4.2 MMOL/L (ref 3.5–5.5)
SODIUM SERPL-SCNC: 146 MMOL/L (ref 136–145)

## 2018-12-17 PROCEDURE — 36415 COLL VENOUS BLD VENIPUNCTURE: CPT

## 2018-12-17 PROCEDURE — 80048 BASIC METABOLIC PNL TOTAL CA: CPT

## 2018-12-17 NOTE — PROGRESS NOTES
Kidney function is not worse, still impaired. Cont to avoid NSAIDs. Further discussion on next visit. pls notify pt .

## 2018-12-18 NOTE — PROGRESS NOTES
Patient identified with 2 identifiers (name and ). Patient aware of        Kidney function is not worse, still impaired.  Cont to avoid NSAIDs

## 2019-01-03 ENCOUNTER — OFFICE VISIT (OUTPATIENT)
Dept: FAMILY MEDICINE CLINIC | Age: 69
End: 2019-01-03

## 2019-01-03 VITALS
OXYGEN SATURATION: 96 % | BODY MASS INDEX: 28.21 KG/M2 | HEIGHT: 61 IN | WEIGHT: 149.4 LBS | DIASTOLIC BLOOD PRESSURE: 90 MMHG | TEMPERATURE: 98.1 F | HEART RATE: 64 BPM | RESPIRATION RATE: 16 BRPM | SYSTOLIC BLOOD PRESSURE: 142 MMHG

## 2019-01-03 DIAGNOSIS — N32.81 OVERACTIVE BLADDER: ICD-10-CM

## 2019-01-03 DIAGNOSIS — D63.8 ANEMIA OF CHRONIC DISEASE: ICD-10-CM

## 2019-01-03 DIAGNOSIS — R03.0 ELEVATED BP WITHOUT DIAGNOSIS OF HYPERTENSION: ICD-10-CM

## 2019-01-03 DIAGNOSIS — E11.9 TYPE 2 DIABETES MELLITUS WITHOUT COMPLICATION, WITHOUT LONG-TERM CURRENT USE OF INSULIN (HCC): ICD-10-CM

## 2019-01-03 DIAGNOSIS — F33.9 RECURRENT DEPRESSION (HCC): Primary | ICD-10-CM

## 2019-01-03 DIAGNOSIS — E78.00 PURE HYPERCHOLESTEROLEMIA: ICD-10-CM

## 2019-01-03 RX ORDER — SERTRALINE HYDROCHLORIDE 100 MG/1
200 TABLET, FILM COATED ORAL DAILY
Qty: 180 TAB | Refills: 0
Start: 2019-01-03 | End: 2019-02-12 | Stop reason: SDUPTHER

## 2019-01-03 NOTE — PROGRESS NOTES
Gcuci Fry, 69 y.o.,  female    SUBJECTIVE  Ff-up    Depression-reports worse during the holidays/wintertime. Feeling more sad, alone. She is currently taking zoloft 100 mg. Denies harmful ideation. NIDDM -FBG 's. taking [place]. Discontinued metformin last month with creatinine trending up, improved on recent BMP check. She reports chronic bilateral feet neuropathy is about the same, and decided to discontinue gabapentin. Fall precautions/consistent cane use discussed. HL- taking lipitor    OAB- taking oxybutynin and responding well to this. GERD- on prilosec. Has h/o anemia of chronic disease that has been stable. Chronic neck pain with radiculopathy- reports following dr. Js Pires for this, currently on flexeril and norco prn. 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 Asthma-doing well she says, on ICS and LAMA for maintenance,  following Dr. Rm Brown  CHRIS on CPAP- reports consistent use,  following  Dr. Nicolas Garcia    ROS:  See HPI, all others negative    ROS random episodes of chills, no wt loss/appetite changes, no hot flashes/night sweates/ fever    Patient Active Problem List   Diagnosis Code    Pure hypercholesterolemia E78.00    Overactive bladder N32.81    Chronic allergic rhinitis J30.9    Anemia D08.2    Uncomplicated asthma K28.104    Type 2 diabetes mellitus without complication, without long-term current use of insulin ([facility]) E11.9       Current Outpatient Prescriptions   Medication Sig Dispense Refill    pantoprazole (PROTONIX) 40 mg tablet Take 1 Tab by mouth daily. 90 Tab 0    metFORMIN ER (GLUCOPHAGE XR) 500 mg tablet Take 2 Tabs by mouth daily (with dinner). 180 Tab 0    montelukast (SINGULAIR) 10 mg tablet Take 1 Tab by mouth daily. 90 Tab 2    cyclobenzaprine (FLEXERIL) 5 mg tablet Take 5 mg by mouth three (3) times daily as needed for Muscle Spasm(s).  aspirin delayed-release 81 mg tablet Take  by mouth daily.       cyanocobalamin (VITAMIN B12) 500 mcg tablet Take 500 mcg by mouth daily.  fluticasone-vilanterol (BREO ELLIPTA) 200-25 mcg/dose inhaler Take 1 Puff by inhalation daily.  atorvastatin (LIPITOR) 10 mg tablet Take 1 Tab by mouth daily. 90 Tab 0    oxybutynin (DITROPAN) 5 mg tablet Take 1 Tab by mouth two (2) times a day. 60 Tab 0    SITagliptin (JANUVIA) 50 mg tablet 50 mg.      gabapentin (NEURONTIN) 100 mg capsule 100 mg.      fluticasone (VERAMYST) 27.5 mcg/actuation nasal spray [place]  in Nose.  cycloSPORINE (RESTASIS) 0.05 % ophthalmic emulsion Instill 1 Drop in affected eye(s) Every 12 hours.  HYDROcodone-acetaminophen (NORCO) 5-325 mg per tablet   0    ipratropium-albuterol (COMBIVENT RESPIMAT)  mcg/actuation inhaler Take 1 inhalation inhaled by mouth Take As Needed.  ferrous sulfate 325 mg (65 mg iron) tablet 325 mg.      mometasone-formoterol (DULERA) 100-5 mcg/actuation HFA inhaler Take 2 Puffs by inhalation two (2) times a day.          Allergies   Allergen Reactions    Iodine Swelling    Nabumetone Swelling    Penicillins Unknown (comments)    Shellfish Containing Products Swelling    Sulfa (Sulfonamide Antibiotics) Swelling       Past Medical History:   Diagnosis Date    Allergic rhinitis     Anemia     Asthma     Candida vaginitis     CKD (chronic kidney disease) stage 3, GFR 30-59 ml/min     COPD (chronic obstructive pulmonary disease) (HCC)     Diabetes (HCC)     Diabetes (HCC)     GERD (gastroesophageal reflux disease)     h pylori gastritis egd 11/14 dr Luis Morse    Headache     Hx of colonoscopy 11/05/2014    normal rpt 10 years, 11/2024, dr. Luis Morse    Hypercholesterolemia     Mild dementia     OAB (overactive bladder)     CHRIS on CPAP     Radiculopathy     Urinary incontinence     UTI (urinary tract infection)        Social History     Social History    Marital status:      Spouse name: N/A    Number of children: N/A    Years of education: N/A     Occupational History    Not on file. Social History Main Topics    Smoking status: Former Smoker     Years: 20.00    Smokeless tobacco: Former User     Quit date: 1/1/1999    Alcohol use Yes    Drug use: No    Sexual activity: Not Currently     Partners: Male     Other Topics Concern    Not on file     Social History Narrative       History reviewed. No pertinent family history.       OBJECTIVE    Physical Exam:     Visit Vitals  /90 (BP 1 Location: Left arm, BP Patient Position: Sitting)   Pulse 64   Temp 98.1 °F (36.7 °C) (Oral)   Resp 16   Ht 5' 1\" (1.549 m)   Wt 149 lb 6.4 oz (67.8 kg)   SpO2 96%   BMI 28.23 kg/m²       General: alert, AA, in no apparent distress or pain  CVS: normal rate, regular rhythm, distinct S1 and S2  Lungs:clear to ausculation bilaterally, no crackles, wheezing or rhonchi noted  Psych:  mood and affect normal    Results for orders placed or performed during the hospital encounter of 12/00/22   METABOLIC PANEL, BASIC   Result Value Ref Range    Sodium 146 (H) 136 - 145 mmol/L    Potassium 4.2 3.5 - 5.5 mmol/L    Chloride 112 (H) 100 - 108 mmol/L    CO2 30 21 - 32 mmol/L    Anion gap 4 3.0 - 18 mmol/L    Glucose 99 74 - 99 mg/dL    BUN 11 7.0 - 18 MG/DL    Creatinine 1.44 (H) 0.6 - 1.3 MG/DL    BUN/Creatinine ratio 8 (L) 12 - 20      GFR est AA 44 (L) >60 ml/min/1.73m2    GFR est non-AA 36 (L) >60 ml/min/1.73m2    Calcium 9.1 8.5 - 10.1 MG/DL         ASSESSMENT/PLAN  Diagnoses and all orders for this visit:    Recurrent  depression  Needs better control  Advise to increase zoloft to 200 mg  Monitoring     Type 2 diabetes mellitus without complication, without long-term current use of insulin (HCC)  Controlled  cont januvia (off metformin due to worsening creatinine level)  No change in feet paresthesia symptoms off gabapentin, will monitor (fall precautions, daily foot checks discussed)  Check a1c, cbc, cmp in 6 weeks     Anemia of chronic disease  Monitoring  Normal GI work up 2014  Iron profile c/w anemia of chronic disease     Pure hypercholesterolemia  Fairly good range on  lipitor  Update lipid 6/2019     Overactive bladder  Responding to ditropan, to cont    Elevated BP without diagnosis of hypertension  DASH diet, monitoring    BMI 28  Encouraged wt loss    Ff-up in 6 weeks or sooner prn    Patient understands plan of care. Patient has provided input and agrees with goals.

## 2019-01-03 NOTE — PATIENT INSTRUCTIONS

## 2019-01-03 NOTE — PROGRESS NOTES
1. Have you been to the ER, urgent care clinic since your last visit? Hospitalized since your last visit? No    2. Have you seen or consulted any other health care providers outside of the [address] [place] [address] since your last visit? Include any pap smears or colon screening. No    Chief Complaint   Patient presents with    Headache     times 1 month    Chills     times 1 month    Asthma    Anemia    Overactive Bladder    Diabetes    Neck Pain     states fell on 11/11/18    Back Pain     states fell on 11/11/18     Patient states she got her influenza vaccine at [address] 9/2018.

## 2019-01-31 RX ORDER — SERTRALINE HYDROCHLORIDE 100 MG/1
TABLET, FILM COATED ORAL
Qty: 90 TAB | Refills: 0 | Status: SHIPPED | OUTPATIENT
Start: 2019-01-31 | End: 2019-02-14

## 2019-02-01 ENCOUNTER — TELEPHONE (OUTPATIENT)
Dept: FAMILY MEDICINE CLINIC | Age: 69
End: 2019-02-01

## 2019-02-01 NOTE — TELEPHONE ENCOUNTER
Pt called and would like all her medication to be placed in an mail order service. Please call pt at your earliest convenience.

## 2019-02-04 NOTE — TELEPHONE ENCOUNTER
Patient identified with 2 identifiers (name and ). Patient requesting mail order pharmacy that will deliver her medication. Instructed patient to call her insurance pharmacy line and set up account. Then let us know which mail order pharmacy she is using. Patient verbalized understanding.

## 2019-02-07 RX ORDER — PANTOPRAZOLE SODIUM 40 MG/1
TABLET, DELAYED RELEASE ORAL
Qty: 90 TAB | Refills: 0 | Status: SHIPPED | OUTPATIENT
Start: 2019-02-07 | End: 2019-02-12 | Stop reason: SDUPTHER

## 2019-02-08 ENCOUNTER — HOSPITAL ENCOUNTER (OUTPATIENT)
Dept: LAB | Age: 69
Discharge: HOME | End: 2019-02-08
Payer: MEDICARE

## 2019-02-08 DIAGNOSIS — D63.8 ANEMIA OF CHRONIC DISEASE: ICD-10-CM

## 2019-02-08 DIAGNOSIS — E11.9 TYPE 2 DIABETES MELLITUS WITHOUT COMPLICATION, WITHOUT LONG-TERM CURRENT USE OF INSULIN (HCC): ICD-10-CM

## 2019-02-08 LAB
ANION GAP SERPL CALC-SCNC: 5 MMOL/L (ref 3–18)
BASOPHILS # BLD: 0 K/UL (ref 0–0.1)
BASOPHILS NFR BLD: 0 % (ref 0–2)
BUN SERPL-MCNC: 18 MG/DL (ref 7–18)
BUN/CREAT SERPL: 12 (ref 12–20)
CALCIUM SERPL-MCNC: 8.8 MG/DL (ref 8.5–10.1)
CHLORIDE SERPL-SCNC: 110 MMOL/L (ref 100–108)
CO2 SERPL-SCNC: 30 MMOL/L (ref 21–32)
CREAT SERPL-MCNC: 1.51 MG/DL (ref 0.6–1.3)
DIFFERENTIAL METHOD BLD: ABNORMAL
EOSINOPHIL # BLD: 0.1 K/UL (ref 0–0.4)
EOSINOPHIL NFR BLD: 3 % (ref 0–5)
ERYTHROCYTE [DISTWIDTH] IN BLOOD BY AUTOMATED COUNT: 15.8 % (ref 11.6–14.5)
GLUCOSE SERPL-MCNC: 88 MG/DL (ref 74–99)
HBA1C MFR BLD: 6.6 % (ref 4.2–5.6)
HCT VFR BLD AUTO: 27.6 % (ref 35–45)
HGB BLD-MCNC: 9.3 G/DL (ref 12–16)
LYMPHOCYTES # BLD: 2 K/UL (ref 0.9–3.6)
LYMPHOCYTES NFR BLD: 43 % (ref 21–52)
MCH RBC QN AUTO: 26.9 PG (ref 24–34)
MCHC RBC AUTO-ENTMCNC: 33.7 G/DL (ref 31–37)
MCV RBC AUTO: 79.8 FL (ref 74–97)
MONOCYTES # BLD: 0.5 K/UL (ref 0.05–1.2)
MONOCYTES NFR BLD: 10 % (ref 3–10)
NEUTS SEG # BLD: 2.1 K/UL (ref 1.8–8)
NEUTS SEG NFR BLD: 44 % (ref 40–73)
PLATELET # BLD AUTO: 180 K/UL (ref 135–420)
PMV BLD AUTO: 9.3 FL (ref 9.2–11.8)
POTASSIUM SERPL-SCNC: 4 MMOL/L (ref 3.5–5.5)
RBC # BLD AUTO: 3.46 M/UL (ref 4.2–5.3)
SODIUM SERPL-SCNC: 145 MMOL/L (ref 136–145)
WBC # BLD AUTO: 4.7 K/UL (ref 4.6–13.2)

## 2019-02-08 PROCEDURE — 36415 COLL VENOUS BLD VENIPUNCTURE: CPT

## 2019-02-08 PROCEDURE — 85025 COMPLETE CBC W/AUTO DIFF WBC: CPT

## 2019-02-08 PROCEDURE — 83036 HEMOGLOBIN GLYCOSYLATED A1C: CPT

## 2019-02-08 PROCEDURE — 80048 BASIC METABOLIC PNL TOTAL CA: CPT

## 2019-02-14 ENCOUNTER — HOSPITAL ENCOUNTER (OUTPATIENT)
Dept: LAB | Age: 69
Discharge: HOME | End: 2019-02-14
Payer: MEDICARE

## 2019-02-14 ENCOUNTER — OFFICE VISIT (OUTPATIENT)
Dept: FAMILY MEDICINE CLINIC | Age: 69
End: 2019-02-14

## 2019-02-14 VITALS
DIASTOLIC BLOOD PRESSURE: 74 MMHG | OXYGEN SATURATION: 92 % | WEIGHT: 146.8 LBS | HEIGHT: 61 IN | RESPIRATION RATE: 14 BRPM | SYSTOLIC BLOOD PRESSURE: 136 MMHG | BODY MASS INDEX: 27.72 KG/M2 | HEART RATE: 57 BPM | TEMPERATURE: 97.7 F

## 2019-02-14 DIAGNOSIS — R07.9 CHEST PAIN, UNSPECIFIED TYPE: Primary | ICD-10-CM

## 2019-02-14 DIAGNOSIS — R07.9 CHEST PAIN, UNSPECIFIED TYPE: ICD-10-CM

## 2019-02-14 DIAGNOSIS — R42 DIZZINESS: ICD-10-CM

## 2019-02-14 DIAGNOSIS — E11.9 TYPE 2 DIABETES MELLITUS WITHOUT COMPLICATION, WITHOUT LONG-TERM CURRENT USE OF INSULIN (HCC): ICD-10-CM

## 2019-02-14 DIAGNOSIS — R29.6 FREQUENT FALLS: ICD-10-CM

## 2019-02-14 DIAGNOSIS — F33.9 RECURRENT DEPRESSION (HCC): ICD-10-CM

## 2019-02-14 DIAGNOSIS — G47.33 OSA ON CPAP: ICD-10-CM

## 2019-02-14 DIAGNOSIS — R42 LIGHTHEADEDNESS: ICD-10-CM

## 2019-02-14 DIAGNOSIS — R26.81 GAIT INSTABILITY: ICD-10-CM

## 2019-02-14 DIAGNOSIS — N32.81 OVERACTIVE BLADDER: ICD-10-CM

## 2019-02-14 DIAGNOSIS — Z99.89 OSA ON CPAP: ICD-10-CM

## 2019-02-14 DIAGNOSIS — E78.00 PURE HYPERCHOLESTEROLEMIA: ICD-10-CM

## 2019-02-14 DIAGNOSIS — D63.8 ANEMIA OF CHRONIC DISEASE: ICD-10-CM

## 2019-02-14 DIAGNOSIS — J45.909 UNCOMPLICATED ASTHMA, UNSPECIFIED ASTHMA SEVERITY, UNSPECIFIED WHETHER PERSISTENT: ICD-10-CM

## 2019-02-14 PROCEDURE — 93005 ELECTROCARDIOGRAM TRACING: CPT

## 2019-02-14 RX ORDER — BUPROPION HYDROCHLORIDE 150 MG/1
150 TABLET ORAL
Qty: 30 TAB | Refills: 0 | Status: SHIPPED | OUTPATIENT
Start: 2019-02-14 | End: 2019-06-12 | Stop reason: SDUPTHER

## 2019-02-14 NOTE — PROGRESS NOTES
Brittani Hamm, 69 y.o.,  female    SUBJECTIVE  Ff-up and multiple other concerns    Depression-reports minimal improvement with increase in zoloft dose to 200 mg last month. She reports to have been seeing a therapist or psychiatrist? Yaniv Marroquin will call us with information    NIDDM w/ nephropathy taking januvia. (metformin prev d/mariella with declining gfr) She reports chronic bilateral feet neuropathy is about the same, and decided to discontinue gabapentin. Fall precautions/consistent cane use discussed. Reviewed labs     C/o gait unsteadiness and lightheadedness. Occurs at times when getting up from sitting. She reports chronic vertigo, but feels recent symptoms are worse. Daphene Graces few times due to dizziness. She denies back pain/ spine issues. Denies hearing problems or tinnitus. She also reports intermittent exertional sternal chest pain with shortness of breath past few months. She denies diaphoresis, palpitations. HL- taking lipitor    OAB- taking oxybutynin and responding well to this. GERD- on prilosec. Has h/o anemia of chronic disease that has been stable. Chronic neck pain with radiculopathy- reports following dr. Salena Mercado for this, currently on flexeril and norco prn. 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      Asthma-doing well she says, on ICS and LAMA for maintenance,  following Dr. Katalina Gage    CHRIS on CPAP- reports consistent use,  following  Dr. Radha Richards    ROS:  See HPI, all others negative    ROS random episodes of chills, no wt loss/appetite changes, no hot flashes/night sweates/ fever    Patient Active Problem List   Diagnosis Code    Pure hypercholesterolemia E78.00    Overactive bladder N32.81    Chronic allergic rhinitis J30.9    Anemia E87.5    Uncomplicated asthma C94.337    Type 2 diabetes mellitus without complication, without long-term current use of insulin ([facility] [address] E11.9       Current Outpatient Prescriptions   Medication Sig Dispense Refill    pantoprazole (PROTONIX) 40 mg tablet Take 1 Tab by mouth daily. 90 Tab 0    metFORMIN ER (GLUCOPHAGE XR) 500 mg tablet Take 2 Tabs by mouth daily (with dinner). 180 Tab 0    montelukast (SINGULAIR) 10 mg tablet Take 1 Tab by mouth daily. 90 Tab 2    cyclobenzaprine (FLEXERIL) 5 mg tablet Take 5 mg by mouth three (3) times daily as needed for Muscle Spasm(s).  aspirin delayed-release 81 mg tablet Take  by mouth daily.  cyanocobalamin (VITAMIN B12) 500 mcg tablet Take 500 mcg by mouth daily.  fluticasone-vilanterol (BREO ELLIPTA) 200-25 mcg/dose inhaler Take 1 Puff by inhalation daily.  atorvastatin (LIPITOR) 10 mg tablet Take 1 Tab by mouth daily. 90 Tab 0    oxybutynin (DITROPAN) 5 mg tablet Take 1 Tab by mouth two (2) times a day. 60 Tab 0    SITagliptin (JANUVIA) 50 mg tablet 50 mg.      gabapentin (NEURONTIN) 100 mg capsule 100 mg.      fluticasone (VERAMYST) 27.5 mcg/actuation nasal spray [place]  in Nose.  cycloSPORINE (RESTASIS) 0.05 % ophthalmic emulsion Instill 1 Drop in affected eye(s) Every 12 hours.  HYDROcodone-acetaminophen (NORCO) 5-325 mg per tablet   0    ipratropium-albuterol (COMBIVENT RESPIMAT)  mcg/actuation inhaler Take 1 inhalation inhaled by mouth Take As Needed.  ferrous sulfate 325 mg (65 mg iron) tablet 325 mg.      mometasone-formoterol (DULERA) 100-5 mcg/actuation HFA inhaler Take 2 Puffs by inhalation two (2) times a day.          Allergies   Allergen Reactions    Iodine Swelling    Nabumetone Swelling    Penicillins Unknown (comments)    Shellfish Containing Products Swelling    Sulfa (Sulfonamide Antibiotics) Swelling       Past Medical History:   Diagnosis Date    Allergic rhinitis     Anemia     Asthma     Candida vaginitis     CKD (chronic kidney disease) stage 3, GFR 30-59 ml/min     COPD (chronic obstructive pulmonary disease) (HCC)     Diabetes (HCC)     Diabetes (HCC)     GERD (gastroesophageal reflux disease)     h pylori gastritis egd 11/14 dr Tra Castaneda    Headache     Hx of colonoscopy 11/05/2014    normal rpt 10 years, 11/2024, dr. Tra Castaneda    Hypercholesterolemia     Mild dementia     OAB (overactive bladder)     CHRIS on CPAP     Radiculopathy     Urinary incontinence     UTI (urinary tract infection)        Social History     Social History    Marital status:      Spouse name: N/A    Number of children: N/A    Years of education: N/A     Occupational History    Not on file. Social History Main Topics    Smoking status: Former Smoker     Years: 20.00    Smokeless tobacco: Former User     Quit date: 1/1/1999    Alcohol use Yes    Drug use: No    Sexual activity: Not Currently     Partners: Male     Other Topics Concern    Not on file     Social History Narrative       History reviewed. No pertinent family history.       OBJECTIVE    Physical Exam:     Visit Vitals  /74 (BP 1 Location: Left arm, BP Patient Position: Sitting)   Pulse (!) 57   Temp 97.7 °F (36.5 °C) (Oral)   Resp 14   Ht 5' 1\" (1.549 m)   Wt 146 lb 12.8 oz (66.6 kg)   SpO2 92%   BMI 27.74 kg/m²       General: alert, AA, in no apparent distress or pain  HEENT: throat and pharynx clear, tm intact, eac patent  CVS: normal rate, regular rhythm, distinct S1 and S2  Lungs:clear to ausculation bilaterally, no crackles, wheezing or rhonchi noted  Psych:  mood and affect normal    Results for orders placed or performed during the hospital encounter of 02/08/19   HEMOGLOBIN A1C W/O EAG   Result Value Ref Range    Hemoglobin A1c 6.6 (H) 4.2 - 5.6 %   METABOLIC PANEL, BASIC   Result Value Ref Range    Sodium 145 136 - 145 mmol/L    Potassium 4.0 3.5 - 5.5 mmol/L    Chloride 110 (H) 100 - 108 mmol/L    CO2 30 21 - 32 mmol/L    Anion gap 5 3.0 - 18 mmol/L    Glucose 88 74 - 99 mg/dL    BUN 18 7.0 - 18 MG/DL    Creatinine 1.51 (H) 0.6 - 1.3 MG/DL    BUN/Creatinine ratio 12 12 - 20      GFR est AA 41 (L) >60 ml/min/1.73m2    GFR est non-AA 34 (L) >60 ml/min/1.73m2    Calcium 8.8 8.5 - 10.1 MG/DL   CBC WITH AUTOMATED DIFF   Result Value Ref Range    WBC 4.7 4.6 - 13.2 K/uL    RBC 3.46 (L) 4.20 - 5.30 M/uL    HGB 9.3 (L) 12.0 - 16.0 g/dL    HCT 27.6 (L) 35.0 - 45.0 %    MCV 79.8 74.0 - 97.0 FL    MCH 26.9 24.0 - 34.0 PG    MCHC 33.7 31.0 - 37.0 g/dL    RDW 15.8 (H) 11.6 - 14.5 %    PLATELET 440 238 - 253 K/uL    MPV 9.3 9.2 - 11.8 FL    NEUTROPHILS 44 40 - 73 %    LYMPHOCYTES 43 21 - 52 %    MONOCYTES 10 3 - 10 %    EOSINOPHILS 3 0 - 5 %    BASOPHILS 0 0 - 2 %    ABS. NEUTROPHILS 2.1 1.8 - 8.0 K/UL    ABS. LYMPHOCYTES 2.0 0.9 - 3.6 K/UL    ABS. MONOCYTES 0.5 0.05 - 1.2 K/UL    ABS. EOSINOPHILS 0.1 0.0 - 0.4 K/UL    ABS. BASOPHILS 0.0 0.0 - 0.1 K/UL    DF AUTOMATED           ASSESSMENT/PLAN  Diagnoses and all orders for this visit:    Recurrent  depression  Needs better control  Cont zoloft to 200 mg  Add wellbutrin  Plan to refer to psychiatrist if current specialist she is seeing is a counselor. She will call us when she gets home. Monitoring    Chest pain   Concerning with risk factors  EKG  Referral to cardiology  Ed precautions discussed    Lightheadedness    Gait disturbance  Referral to neurology  Fall precautions/consistent cane use discussed    Frequent falls     Type 2 diabetes mellitus without complication, without long-term current use of insulin (HCC)  Controlled  cont januvia (off metformin due to worsening gfrl)  Check cmp/a1c/microalbumin/ cbc/lipid  in 3 months prior to next visit     Anemia of chronic disease  Monitoring  Normal GI work up 2014  Iron profile c/w anemia of chronic disease     Pure hypercholesterolemia  Fairly good range on  lipitor  Lipid panel prior to next visit     Overactive bladder  Responding to ditropan, to cont    Ff-up in 4 weeks or sooner prn  Ff-up in 3 months, labs prior     Patient understands plan of care. Patient has provided input and agrees with goals.

## 2019-02-14 NOTE — PROGRESS NOTES
Hany Tejada, 78 yo F, 1950  CCs: Dizziness/instability, Loose BMs, and Chest pain    Ms. Larissa Lemos presents with the complain of dizziness that has become accentuated over the past 2 weeks. She has had this to a milder degree for several years. She has notes standing triggers it and sitting relieves and she has not tried to treat it with medication. She notes feeling lightheaded on standing and unstable, sometimes falling. She denies passing out, but admits to headaches, which she treats effectively with MIGRELIEF. She denies fevers or N/V. Notes no connection to time of day. She also complains of loose BMs. This has been going on for the past 2 weeks without progression. She notes notes that eating, particularly fruit seem to bring it on. She notes no palliative factors and hasn't tried to treat it. She notes soft BMs and occasional watery diarrhea with no hematochezia or melena. She denies any new onset dysuria or urinary pain. Notes no connection to time of day. She further complains of chest pain of about 2 weeks duration that has not shown appreciable progression. The pain is a squeezing intermittent pain that is centered on her chest, but does not radiate to the jaw or arm. She notes no connection to time of day and has not tried to treat it. Exertion tends to be a provocative factor, though it can sometimes occur at rest as well. No palliative factors are noted. PMH Discussed Today:  - DM - says sugars are in the 80s to 120s. Continues to take [address] - CKD - Wants to know if it has progressed, which it hasn't to this point  - Depression - Pt mentions that she still feels depressed and anhedonic.   - HTN   - HL - Pending lipid labs to assess statin efficacy.     Patient Active Problem List   Diagnosis Code    Pure hypercholesterolemia E78.00    Overactive bladder N32.81    Chronic allergic rhinitis J30.9    Anemia L62.4    Uncomplicated asthma O15.071    Type 2 diabetes mellitus without complication, without long-term current use of insulin (HCC) E11.9                Current Outpatient Prescriptions   Medication Sig Dispense Refill    pantoprazole (PROTONIX) 40 mg tablet Take 1 Tab by mouth daily. 90 Tab 0    metFORMIN ER (GLUCOPHAGE XR) 500 mg tablet Take 2 Tabs by mouth daily (with dinner). 180 Tab 0    montelukast (SINGULAIR) 10 mg tablet Take 1 Tab by mouth daily. 90 Tab 2    cyclobenzaprine (FLEXERIL) 5 mg tablet Take 5 mg by mouth three (3) times daily as needed for Muscle Spasm(s).        aspirin delayed-release 81 mg tablet Take  by mouth daily.        cyanocobalamin (VITAMIN B12) 500 mcg tablet Take 500 mcg by mouth daily.        fluticasone-vilanterol (BREO ELLIPTA) 200-25 mcg/dose inhaler Take 1 Puff by inhalation daily.        atorvastatin (LIPITOR) 10 mg tablet Take 1 Tab by mouth daily. 90 Tab 0    oxybutynin (DITROPAN) 5 mg tablet Take 1 Tab by mouth two (2) times a day.  60 Tab 0    SITagliptin (JANUVIA) 50 mg tablet 50 mg.        gabapentin (NEURONTIN) 100 mg capsule 100 mg.        fluticasone (VERAMYST) 27.5 mcg/actuation nasal spray [place]  in Nose.        cycloSPORINE (RESTASIS) 0.05 % ophthalmic emulsion Instill 1 Drop in affected eye(s) Every 12 hours.        HYDROcodone-acetaminophen (NORCO) 5-325 mg per tablet     0    ipratropium-albuterol (COMBIVENT RESPIMAT)  mcg/actuation inhaler Take 1 inhalation inhaled by mouth Take As Needed.        ferrous sulfate 325 mg (65 mg iron) tablet 325 mg.        mometasone-formoterol (DULERA) 100-5 mcg/actuation HFA inhaler Take 2 Puffs by inhalation two (2) times a day.                  Allergies   Allergen Reactions    Iodine Swelling    Nabumetone Swelling    Penicillins Unknown (comments)    Shellfish Containing Products Swelling    Sulfa (Sulfonamide Antibiotics) Swelling              Past Medical History:   Diagnosis Date    Allergic rhinitis      Anemia      Asthma      Candida vaginitis      CKD (chronic kidney disease) stage 3, GFR 30-59 ml/min      COPD (chronic obstructive pulmonary disease) (HCC)      Diabetes (HCC)      Diabetes ([facility] [address]      GERD (gastroesophageal reflux disease)       h pylori gastritis egd 11/14 dr Lex Fox    Headache      Hx of colonoscopy 11/05/2014     normal rpt 10 years, 11/2024, dr. Lozano Purpnacho Hypercholesterolemia      Mild dementia      OAB (overactive bladder)      CHRIS on CPAP      Radiculopathy      Urinary incontinence      UTI (urinary tract infection)           Social History            Social History    Marital status:        Spouse name: N/A    Number of children: N/A    Years of education: N/A          Occupational History    Not on file.             Social History Main Topics    Smoking status: Former Smoker       Years: 20.00    Smokeless tobacco: Former User       Quit date: 1/1/1999    Alcohol use Yes     Drug use: No    Sexual activity: Not Currently       Partners: Male           Other Topics Concern    Not on file      Social History Narrative           PHYSICAL EXAM  Vitals 2/14/2019   Blood Pressure 136/74   Pulse 57   Temp 97.7   Resp 14   Height 5' 1\"   Weight 146 lb 12.8 oz   SpO2 92   BSA 1.69 m2   BMI 27.74 kg/m2     - General: Alert and not in distress. Oriented x3, but seems to occasional lose focus as to what is being asked. - HEENT: No erythema of the TMs or ear canal. No erythema, edema of the turbinates or nasal canal, mild nasal congestion. No erythema or edema of the buccal, gingival or pharyngeal mucosa, no tonsillar enlargement, missing lower right front teeth, otherwise no dental erosion. Well demarcated ocular vessels with no hemorrhage or edema.   - Neck: No LAD  - Chest: RRR with no murmurs rubs or gallops  - Lungs: CTA in all fields. - Abdomen: No bruising, erythema or deformities, normoactive bowel sounds in all quadrants, no dullness to percussion, no tenderness to palpation, no hepatosplenomegaly.    - Extremities: 2+ bilateral radial and pedal pulses with no leg edema  - Neuro: 2+ bilateral patellar reflexes    LABS:  WBC 4.7   4.6 - 13.2 K/uL Final   RBC 3.46 Abnormally low   L  4.20 - 5.30 M/uL Final   HGB 9.3 Abnormally low   L  12.0 - 16.0 g/dL Final   HCT 27.6 Abnormally low   L  35.0 - 45.0 % Final   MCV 79.8   74.0 - 97.0 FL Final   MCH 26.9   24.0 - 34.0 PG Final   MCHC 33.7   31.0 - 37.0 g/dL Final   RDW 15.8 Abnormally high   H  11.6 - 14.5 % Final   PLATELET 414   534 - 420 K/uL Final   MPV 9.3   9.2 - 11.8 FL Final   NEUTROPHILS 44   40 - 73 % Final   LYMPHOCYTES 43   21 - 52 % Final   MONOCYTES 10   3 - 10 % Final   EOSINOPHILS 3   0 - 5 % Final   BASOPHILS 0   0 - 2 % Final   ABS. NEUTROPHILS 2.1   1.8 - 8.0 K/UL Final   ABS. LYMPHOCYTES 2.0   0.9 - 3.6 K/UL Final   ABS. MONOCYTES 0.5   0.05 - 1.2 K/UL Final   ABS. EOSINOPHILS 0.1   0.0 - 0.4 K/UL Final   ABS. BASOPHILS 0.0   0.0 - 0.1 K/UL Final   DF AUTOMATED      Final     Sodium 145   136 - 145 mmol/L Final   Potassium 4.0   3.5 - 5.5 mmol/L Final   Chloride 110 Abnormally high   H  100 - 108 mmol/L Final   CO2 30   21 - 32 mmol/L Final   Anion gap 5   3.0 - 18 mmol/L Final   Glucose 88   74 - 99 mg/dL Final   BUN 18   7.0 - 18 MG/DL Final   Creatinine 1.51 Abnormally high   H  0.6 - 1.3 MG/DL Final   BUN/Creatinine ratio 12   12 - 20   Final   GFR est AA 41 Abnormally low   L  >60 ml/min/1.73m2 Final   GFR est non-AA 34 Abnormally low   L  >60 ml/min/1.73m2 Final   Comment:   (NOTE)   Estimated GFR is calculated using the Modification of Diet in Renal   Disease (MDRD) Study equation, reported for both  Americans   (GFRAA) and non- Americans (GFRNA), and normalized to 1.73m2   body surface area. The physician must decide which value applies to   the patient. The MDRD study equation should only be used in   individuals age 25 or older.  It has not been validated for the   following: pregnant women, patients with serious comorbid conditions,   or on certain medications, or persons with extremes of body size,   muscle mass, or nutritional status. Calcium 8.8   8.5 - 10.1 MG/DL Final     Hemoglobin A1c 6.6 Abnormally high   H  4.2 - 5.6 % Final         ASSESSMENT/PLAN:  77year old female with. ... 1. Diabetes Mellitus T2, Well controlled  - Continue Sitagliptan  - Advised patient to continue monitoring sugars and watch diet    2. CKD, stage 3, stable  - Continue to follow CMP to assess for advancement  - Follow up with cardiology complete assessment of CP to gauge further direction, if any. 3. Essential HTN  - Follow up with cardiology to determine if anti-HTN therapy based on their assessment of pts chest pain    4. Chest pain suspicious for CAD  - Referral to cardiology for stress test, ECG, Enzymes and further CAD/CHF workup. 5. Dizziness, seems related to syncope and possible orthostasis  - Gauge further steps based on cardiology assessment  - Referral to Neurology for assessment of possible FND. 6. Depression with poor response to therapy  - Refer to psychiatry   - continue to current meds pending potential adjustment by psychiatrist    7. Anemia of chronic disease:   - Appears stable, continue to follow with serial CBC. 8. Hyperlipidemia  - Stability pending results of upcoming lipid panel. 9. Loose Bowel Movements, likely related to diet  - Advised patient to be conscientious of fiber and appropriate diet. 10. Preventative Care/Vaccines:  - Up to date and normal  - Shingles vaccines to be discussed at a later appt. *ATTENTION:  This note has been created by a medical student for educational purposes only. Please do not refer to the content of this note for clinical decision-making, billing, or other purposes. Please see attending physicians note to obtain clinical information on this patient. *

## 2019-02-14 NOTE — PATIENT INSTRUCTIONS
Recovering From Depression: Care Instructions  Your Care Instructions    Taking good care of yourself is important as you recover from depression. In time, your symptoms will fade as your treatment takes hold. Do not give up. Instead, focus your energy on getting better. Your mood will improve. It just takes some time. Focus on things that can help you feel better, such as being with friends and family, eating well, and getting enough rest. But take things slowly. Do not do too much too soon. You will begin to feel better gradually. Follow-up care is a key part of your treatment and safety. Be sure to make and go to all appointments, and call your doctor if you are having problems. It's also a good idea to know your test results and keep a list of the medicines you take. How can you care for yourself at home? Be realistic  · If you have a large task to do, break it up into smaller steps you can handle, and just do what you can. · You may want to put off important decisions until your depression has lifted. If you have plans that will have a major impact on your life, such as marriage, divorce, or a job change, try to wait a bit. Talk it over with friends and loved ones who can help you look at the overall picture first.  · Reaching out to people for help is important. Do not isolate yourself. Let your family and friends help you. Find someone you can trust and confide in, and talk to that person. · Be patient, and be kind to yourself. Remember that depression is not your fault and is not something you can overcome with willpower alone. Treatment is necessary for depression, just like for any other illness. Feeling better takes time, and your mood will improve little by little. Stay active  · Stay busy and get outside. Take a walk, or try some other light exercise. · Talk with your doctor about an exercise program. Exercise can help with mild depression. · Go to a movie or concert.  Take part in a [employer] activity or other social gathering. Go to a [employer] game. · Ask a friend to have dinner with you. Take care of yourself  · Eat a balanced diet with plenty of fresh fruits and vegetables, whole grains, and lean protein. If you have lost your appetite, eat small snacks rather than large meals. · Avoid drinking alcohol or using illegal drugs. Do not take medicines that have not been prescribed for you. They may interfere with medicines you may be taking for depression, or they may make your depression worse. · Take your medicines exactly as they are prescribed. You may start to feel better within 1 to 3 weeks of taking antidepressant medicine. But it can take as many as 6 to 8 weeks to see more improvement. If you have questions or concerns about your medicines, or if you do not notice any improvement by 3 weeks, talk to your doctor. · If you have any side effects from your medicine, tell your doctor. Antidepressants can make you feel tired, dizzy, or nervous. Some people have dry mouth, constipation, headaches, sexual problems, or diarrhea. Many of these side effects are mild and will go away on their own after you have been taking the medicine for a few weeks. Some may last longer. Talk to your doctor if side effects are bothering you too much. You might be able to try a different medicine. · Get enough sleep. If you have problems sleeping:  ? Go to bed at the same time every night, and get up at the same time every morning. ? Keep your bedroom dark and quiet. ? Do not exercise after 5:00 p.m.  ? Avoid drinks with caffeine after 5:00 p.m. · Avoid sleeping pills unless they are prescribed by the doctor treating your depression. Sleeping pills may make you groggy during the day, and they may interact with other medicine you are taking. · If you have any other illnesses, such as diabetes, heart disease, or high blood pressure, make sure to continue with your treatment.  Tell your doctor about all of the medicines you take, including those with or without a prescription. · Keep the numbers for these national suicide hotlines: 1-910-661-TALK (1-597.503.1969) and 7-347-BCPNETX (1-847.675.1536). If you or someone you know talks about suicide or feeling hopeless, get help right away. When should you call for help? Call 911 anytime you think you may need emergency care. For example, call if:    · You feel like hurting yourself or someone else.     · Someone you know has depression and is about to attempt or is attempting suicide.   [facility] your doctor now or seek immediate medical care if:    · You hear voices.     · Someone you know has depression and:  ? Starts to give away his or her possessions. ? Uses illegal drugs or drinks alcohol heavily. ? Talks or writes about death, including writing suicide notes or talking about guns, knives, or pills. ? Starts to spend a lot of time alone. ? Acts very aggressively or suddenly appears calm.    Watch closely for changes in your health, and be sure to contact your doctor if:    · You do not get better as expected. Where can you learn more? Go to http://danika-sophia.info/. Enter O593 in the search box to learn more about \"Recovering From Depression: Care Instructions. \"  Current as of: September 11, 2018  Content Version: 11.9  © 8177-1304 [employer]. Care instructions adapted under license by [employer] (which disclaims liability or warranty for this information). If you have questions about a medical condition or this instruction, always ask your healthcare professional. [place] [address] any warranty or liability for your use of this information. Chest Pain: Care Instructions  Your Care Instructions    There are many things that can cause chest pain. Some are not serious and will get better on their own in a few days. But some kinds of chest pain need more testing and treatment.  Your doctor may have recommended a follow-up visit in the next 8 to 12 hours. If you are not getting better, you may need more tests or treatment. Even though your doctor has released you, you still need to watch for any problems. The doctor carefully checked you, but sometimes problems can develop later. If you have new symptoms or if your symptoms do not get better, get medical care right away. If you have worse or different chest pain or pressure that lasts more than 5 minutes or you passed out (lost consciousness), call 911 or seek other emergency help right away. A medical visit is only one step in your treatment. Even if you feel better, you still need to do what your doctor recommends, such as going to all suggested follow-up appointments and taking medicines exactly as directed. This will help you recover and help prevent future problems. How can you care for yourself at home? · Rest until you feel better. · Take your medicine exactly as prescribed. Call your doctor if you think you are having a problem with your medicine. · Do not drive after taking a prescription pain medicine. When should you call for help? Call 911 if:    · You passed out (lost consciousness).     · You have severe difficulty breathing.     · You have symptoms of a heart attack. These may include:  ? Chest pain or pressure, or a strange feeling in your chest.  ? Sweating. ? Shortness of breath. ? Nausea or vomiting. ? Pain, pressure, or a strange feeling in your back, neck, jaw, or upper belly or in one or both shoulders or arms. ? Lightheadedness or sudden weakness. ? A fast or irregular heartbeat. After you call 911, the  may tell you to chew 1 adult-strength or 2 to 4 low-dose aspirin. Wait for an ambulance.  Do not try to drive yourself.    Call your doctor today if:    · You have any trouble breathing.     · Your chest pain gets worse.     · You are dizzy or lightheaded, or you feel like you may faint.     · You are not getting better as expected.     · You are having new or different chest pain. Where can you learn more? Go to http://danika-sophia.info/. Enter A120 in the search box to learn more about \"Chest Pain: Care Instructions. \"  Current as of: September 23, 2018  Content Version: 11.9  © 5899-0694 [employer]. Care instructions adapted under license by [employer] (which disclaims liability or warranty for this information). If you have questions about a medical condition or this instruction, always ask your healthcare professional. [address] any warranty or liability for your use of this information.

## 2019-02-14 NOTE — PROGRESS NOTES
1. Have you been to the ER, urgent care clinic since your last visit? Hospitalized since your last visit? No    2. Have you seen or consulted any other health care providers outside of the [address] [place] [address] since your last visit? Include any pap smears or colon screening.  No    Chief Complaint   Patient presents with    Other     \"feeling off balance\" times 1 month    Depression    Diabetes    Elevated Blood Pressure    Cholesterol Problem    Chest Pain     times 3 days    Diarrhea     times 2 weeks

## 2019-02-15 LAB
ATRIAL RATE: 59 BPM
CALCULATED P AXIS, ECG09: 40 DEGREES
CALCULATED R AXIS, ECG10: -5 DEGREES
CALCULATED T AXIS, ECG11: 22 DEGREES
DIAGNOSIS, 93000: NORMAL
P-R INTERVAL, ECG05: 166 MS
Q-T INTERVAL, ECG07: 414 MS
QRS DURATION, ECG06: 80 MS
QTC CALCULATION (BEZET), ECG08: 409 MS
VENTRICULAR RATE, ECG03: 59 BPM

## 2019-02-15 NOTE — PROGRESS NOTES
Patient returned call. Verified identity using name and date of birth (2- identifiers). Patient advised no concerning EKG findings, mildly low HR. Patient advised to see cardiologist for evaluation. Patient given ph# to Dr. Catarina Tapia to call and schedule as well.

## 2019-02-19 ENCOUNTER — TELEPHONE (OUTPATIENT)
Dept: FAMILY MEDICINE CLINIC | Age: 69
End: 2019-02-19

## 2019-02-19 NOTE — TELEPHONE ENCOUNTER
Patient is scheduled for 4 teeth to be pulled on Thursday by Dr. Ely Rodriguez on [facility] would like to speak with a nurse regarding some concerns she has. Patient would also like to discuss when she was asked about floaters at her recent appt.

## 2019-02-20 NOTE — TELEPHONE ENCOUNTER
Patient identified with 2 identifiers (name and ). Patient wanted to make Dr. Washington Comment aware that she is having 4 teeth removed 2019.

## 2019-03-04 ENCOUNTER — HOSPITAL ENCOUNTER (OUTPATIENT)
Dept: LAB | Age: 69
Discharge: HOME | End: 2019-03-04
Payer: MEDICARE

## 2019-03-04 DIAGNOSIS — E11.9 TYPE 2 DIABETES MELLITUS WITHOUT COMPLICATION, WITHOUT LONG-TERM CURRENT USE OF INSULIN (HCC): ICD-10-CM

## 2019-03-04 LAB
ANION GAP SERPL CALC-SCNC: 7 MMOL/L (ref 3–18)
BASOPHILS # BLD: 0 K/UL (ref 0–0.1)
BASOPHILS NFR BLD: 0 % (ref 0–2)
BUN SERPL-MCNC: 12 MG/DL (ref 7–18)
BUN/CREAT SERPL: 7 (ref 12–20)
CALCIUM SERPL-MCNC: 8.5 MG/DL (ref 8.5–10.1)
CHLORIDE SERPL-SCNC: 110 MMOL/L (ref 100–108)
CHOLEST SERPL-MCNC: 186 MG/DL
CO2 SERPL-SCNC: 28 MMOL/L (ref 21–32)
CREAT SERPL-MCNC: 1.77 MG/DL (ref 0.6–1.3)
CREAT UR-MCNC: 146 MG/DL (ref 30–125)
DIFFERENTIAL METHOD BLD: ABNORMAL
EOSINOPHIL # BLD: 0.1 K/UL (ref 0–0.4)
EOSINOPHIL NFR BLD: 3 % (ref 0–5)
ERYTHROCYTE [DISTWIDTH] IN BLOOD BY AUTOMATED COUNT: 15.3 % (ref 11.6–14.5)
GLUCOSE SERPL-MCNC: 143 MG/DL (ref 74–99)
HBA1C MFR BLD: 7 % (ref 4.2–5.6)
HCT VFR BLD AUTO: 29.2 % (ref 35–45)
HDLC SERPL-MCNC: 64 MG/DL (ref 40–60)
HDLC SERPL: 2.9 {RATIO} (ref 0–5)
HGB BLD-MCNC: 9.5 G/DL (ref 12–16)
LDLC SERPL CALC-MCNC: 107.6 MG/DL (ref 0–100)
LIPID PROFILE,FLP: ABNORMAL
LYMPHOCYTES # BLD: 1.2 K/UL (ref 0.9–3.6)
LYMPHOCYTES NFR BLD: 27 % (ref 21–52)
MCH RBC QN AUTO: 25.9 PG (ref 24–34)
MCHC RBC AUTO-ENTMCNC: 32.5 G/DL (ref 31–37)
MCV RBC AUTO: 79.6 FL (ref 74–97)
MICROALBUMIN UR-MCNC: 3.19 MG/DL (ref 0–3)
MICROALBUMIN/CREAT UR-RTO: 22 MG/G (ref 0–30)
MONOCYTES # BLD: 0.3 K/UL (ref 0.05–1.2)
MONOCYTES NFR BLD: 7 % (ref 3–10)
NEUTS SEG # BLD: 2.8 K/UL (ref 1.8–8)
NEUTS SEG NFR BLD: 63 % (ref 40–73)
PLATELET # BLD AUTO: 184 K/UL (ref 135–420)
PMV BLD AUTO: 9.1 FL (ref 9.2–11.8)
POTASSIUM SERPL-SCNC: 3.9 MMOL/L (ref 3.5–5.5)
RBC # BLD AUTO: 3.67 M/UL (ref 4.2–5.3)
SODIUM SERPL-SCNC: 145 MMOL/L (ref 136–145)
TRIGL SERPL-MCNC: 72 MG/DL (ref ?–150)
VLDLC SERPL CALC-MCNC: 14.4 MG/DL
WBC # BLD AUTO: 4.3 K/UL (ref 4.6–13.2)

## 2019-03-04 PROCEDURE — 80061 LIPID PANEL: CPT

## 2019-03-04 PROCEDURE — 85025 COMPLETE CBC W/AUTO DIFF WBC: CPT

## 2019-03-04 PROCEDURE — 82043 UR ALBUMIN QUANTITATIVE: CPT

## 2019-03-04 PROCEDURE — 83036 HEMOGLOBIN GLYCOSYLATED A1C: CPT

## 2019-03-04 PROCEDURE — 80048 BASIC METABOLIC PNL TOTAL CA: CPT

## 2019-03-04 PROCEDURE — 36415 COLL VENOUS BLD VENIPUNCTURE: CPT

## 2019-03-08 ENCOUNTER — OFFICE VISIT (OUTPATIENT)
Dept: FAMILY MEDICINE CLINIC | Age: 69
End: 2019-03-08

## 2019-03-08 ENCOUNTER — HOSPITAL ENCOUNTER (OUTPATIENT)
Dept: LAB | Age: 69
Discharge: HOME | End: 2019-03-08
Payer: MEDICARE

## 2019-03-08 VITALS
DIASTOLIC BLOOD PRESSURE: 60 MMHG | HEIGHT: 61 IN | BODY MASS INDEX: 27.3 KG/M2 | WEIGHT: 144.6 LBS | SYSTOLIC BLOOD PRESSURE: 112 MMHG | TEMPERATURE: 97.8 F | RESPIRATION RATE: 16 BRPM

## 2019-03-08 DIAGNOSIS — R44.3 HALLUCINATIONS: ICD-10-CM

## 2019-03-08 DIAGNOSIS — F33.1 MODERATE EPISODE OF RECURRENT MAJOR DEPRESSIVE DISORDER (HCC): ICD-10-CM

## 2019-03-08 DIAGNOSIS — N32.81 OVERACTIVE BLADDER: ICD-10-CM

## 2019-03-08 DIAGNOSIS — E78.00 PURE HYPERCHOLESTEROLEMIA: ICD-10-CM

## 2019-03-08 DIAGNOSIS — D63.8 ANEMIA OF CHRONIC DISEASE: Primary | ICD-10-CM

## 2019-03-08 DIAGNOSIS — R63.4 WEIGHT LOSS: ICD-10-CM

## 2019-03-08 DIAGNOSIS — D63.8 ANEMIA OF CHRONIC DISEASE: ICD-10-CM

## 2019-03-08 LAB
BASOPHILS # BLD: 0 K/UL (ref 0–0.1)
BASOPHILS NFR BLD: 0 % (ref 0–2)
DIFFERENTIAL METHOD BLD: ABNORMAL
EOSINOPHIL # BLD: 0.2 K/UL (ref 0–0.4)
EOSINOPHIL NFR BLD: 4 % (ref 0–5)
ERYTHROCYTE [DISTWIDTH] IN BLOOD BY AUTOMATED COUNT: 15.5 % (ref 11.6–14.5)
FERRITIN SERPL-MCNC: 174 NG/ML (ref 8–388)
FOLATE SERPL-MCNC: >20 NG/ML (ref 3.1–17.5)
HCT VFR BLD AUTO: 27.8 % (ref 35–45)
HGB BLD-MCNC: 9.2 G/DL (ref 12–16)
IRON SATN MFR SERPL: 18 %
IRON SERPL-MCNC: 41 UG/DL (ref 50–175)
LYMPHOCYTES # BLD: 1.5 K/UL (ref 0.9–3.6)
LYMPHOCYTES NFR BLD: 30 % (ref 21–52)
MCH RBC QN AUTO: 26.4 PG (ref 24–34)
MCHC RBC AUTO-ENTMCNC: 33.1 G/DL (ref 31–37)
MCV RBC AUTO: 79.7 FL (ref 74–97)
MONOCYTES # BLD: 0.4 K/UL (ref 0.05–1.2)
MONOCYTES NFR BLD: 8 % (ref 3–10)
NEUTS SEG # BLD: 2.9 K/UL (ref 1.8–8)
NEUTS SEG NFR BLD: 58 % (ref 40–73)
PLATELET # BLD AUTO: 186 K/UL (ref 135–420)
PMV BLD AUTO: 9.1 FL (ref 9.2–11.8)
RBC # BLD AUTO: 3.49 M/UL (ref 4.2–5.3)
TIBC SERPL-MCNC: 232 UG/DL (ref 250–450)
TSH SERPL DL<=0.05 MIU/L-ACNC: 4.19 UIU/ML (ref 0.36–3.74)
VIT B12 SERPL-MCNC: >2000 PG/ML (ref 211–911)
WBC # BLD AUTO: 5 K/UL (ref 4.6–13.2)

## 2019-03-08 PROCEDURE — 83021 HEMOGLOBIN CHROMOTOGRAPHY: CPT

## 2019-03-08 PROCEDURE — 85025 COMPLETE CBC W/AUTO DIFF WBC: CPT

## 2019-03-08 PROCEDURE — 36415 COLL VENOUS BLD VENIPUNCTURE: CPT

## 2019-03-08 PROCEDURE — 82607 VITAMIN B-12: CPT

## 2019-03-08 PROCEDURE — 82728 ASSAY OF FERRITIN: CPT

## 2019-03-08 PROCEDURE — 83540 ASSAY OF IRON: CPT

## 2019-03-08 PROCEDURE — 84443 ASSAY THYROID STIM HORMONE: CPT

## 2019-03-08 NOTE — PROGRESS NOTES
1. Have you been to the ER, urgent care clinic since your last visit? Hospitalized since your last visit? No    2. Have you seen or consulted any other health care providers outside of the [address] [place] [address] since your last visit? Include any pap smears or colon screening. Dr. Amy Rojas ([address]. [phone]) LOV: 1 week ago tooth extraction and gum surgery. Patient states she starting to hear voices.

## 2019-03-08 NOTE — PATIENT INSTRUCTIONS
PLEASE CALL US WITH THE NAME OF YOUR PSYCHIATRIST/OFFICE     Recovering From Depression: Care Instructions  Your Care Instructions    Taking good care of yourself is important as you recover from depression. In time, your symptoms will fade as your treatment takes hold. Do not give up. Instead, focus your energy on getting better. Your mood will improve. It just takes some time. Focus on things that can help you feel better, such as being with friends and family, eating well, and getting enough rest. But take things slowly. Do not do too much too soon. You will begin to feel better gradually. Follow-up care is a key part of your treatment and safety. Be sure to make and go to all appointments, and call your doctor if you are having problems. It's also a good idea to know your test results and keep a list of the medicines you take. How can you care for yourself at home? Be realistic  · If you have a large task to do, break it up into smaller steps you can handle, and just do what you can. · You may want to put off important decisions until your depression has lifted. If you have plans that will have a major impact on your life, such as marriage, divorce, or a job change, try to wait a bit. Talk it over with friends and loved ones who can help you look at the overall picture first.  · Reaching out to people for help is important. Do not isolate yourself. Let your family and friends help you. Find someone you can trust and confide in, and talk to that person. · Be patient, and be kind to yourself. Remember that depression is not your fault and is not something you can overcome with willpower alone. Treatment is necessary for depression, just like for any other illness. Feeling better takes time, and your mood will improve little by little. Stay active  · Stay busy and get outside. Take a walk, or try some other light exercise.   · Talk with your doctor about an exercise program. Exercise can help with mild depression. · Go to a movie or concert. Take part in a Christian activity or other social gathering. Go to a ball game. · Ask a friend to have dinner with you. Take care of yourself  · Eat a balanced diet with plenty of fresh fruits and vegetables, whole grains, and lean protein. If you have lost your appetite, eat small snacks rather than large meals. · Avoid drinking alcohol or using illegal drugs. Do not take medicines that have not been prescribed for you. They may interfere with medicines you may be taking for depression, or they may make your depression worse. · Take your medicines exactly as they are prescribed. You may start to feel better within 1 to 3 weeks of taking antidepressant medicine. But it can take as many as 6 to 8 weeks to see more improvement. If you have questions or concerns about your medicines, or if you do not notice any improvement by 3 weeks, talk to your doctor. · If you have any side effects from your medicine, tell your doctor. Antidepressants can make you feel tired, dizzy, or nervous. Some people have dry mouth, constipation, headaches, sexual problems, or diarrhea. Many of these side effects are mild and will go away on their own after you have been taking the medicine for a few weeks. Some may last longer. Talk to your doctor if side effects are bothering you too much. You might be able to try a different medicine. · Get enough sleep. If you have problems sleeping:  ? Go to bed at the same time every night, and get up at the same time every morning. ? Keep your bedroom dark and quiet. ? Do not exercise after 5:00 p.m.  ? Avoid drinks with caffeine after 5:00 p.m. · Avoid sleeping pills unless they are prescribed by the doctor treating your depression. Sleeping pills may make you groggy during the day, and they may interact with other medicine you are taking.   · If you have any other illnesses, such as diabetes, heart disease, or high blood pressure, make sure to continue with your treatment. Tell your doctor about all of the medicines you take, including those with or without a prescription. · Keep the numbers for these national suicide hotlines: 6-044-873-TALK (8-982.187.9011) and 6-623-MKKWRJZ (3-434.130.3348). If you or someone you know talks about suicide or feeling hopeless, get help right away. When should you call for help? Call 911 anytime you think you may need emergency care. For example, call if:    · You feel like hurting yourself or someone else.     · Someone you know has depression and is about to attempt or is attempting suicide.   [facility] your doctor now or seek immediate medical care if:    · You hear voices.     · Someone you know has depression and:  ? Starts to give away his or her possessions. ? Uses illegal drugs or drinks alcohol heavily. ? Talks or writes about death, including writing suicide notes or talking about guns, knives, or pills. ? Starts to spend a lot of time alone. ? Acts very aggressively or suddenly appears calm.    Watch closely for changes in your health, and be sure to contact your doctor if:    · You do not get better as expected. Where can you learn more? Go to http://danika-sophia.info/. Enter D994 in the search box to learn more about \"Recovering From Depression: Care Instructions. \"  Current as of: September 11, 2018  Content Version: 11.9  © 6274-2069 [employer]. Care instructions adapted under license by [employer] (which disclaims liability or warranty for this information). If you have questions about a medical condition or this instruction, always ask your healthcare professional. [place] [address] any warranty or liability for your use of this information.

## 2019-03-10 PROBLEM — E11.21 TYPE 2 DIABETES WITH NEPHROPATHY (HCC): Status: ACTIVE | Noted: 2019-03-10

## 2019-03-10 NOTE — PROGRESS NOTES
Hany Tejada, 69 y.o.,  female    SUBJECTIVE  Ff-up     Depression-continues to be symptomatic feeling down, lonely. Trial addition of wellbutrin to zoloft does not seem to be effective she says, no reported improvement in symptoms. She reports history of auditory hallucinations, none currently. Denies harmful ideation. She lives by herself. Issues with memory with mild cognitive impairment. Has seen neurologist and neuropsychiatrist in the past without much concern. She has upcoming appt with neurology again for evaluation of gait problems/frequent falling/dizziness complaint. Chest pain- none since last visit. She does have risk factors for CAD and has upcoming cardiolgy appt this month for this. NIDDM w/ nephropathy taking [place]. (metformin prev d/mariella with declining gfr) She reports chronic bilateral feet neuropathy is about the same, and decided to discontinue gabapentin. Fall precautions/consistent cane use discussed. Reviewed labs-noted anemia with slightly low wbc. She has chronic anemia thought to be due to anemia of chronic disease. She reports GI work up 2014 normal. She has had iron profile last year wnl    HL- taking lipitor    OAB- taking oxybutynin and responding well to this. GERD- on prilosec. Has h/o anemia of chronic disease that has been stable. Chronic neck pain with radiculopathy- reports following dr. Kathy Jeffries for this, currently on flexeril and norco prn. 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      Asthma-doing well she says, on ICS and LAMA for maintenance,  following Dr. Ryan Romero    CHRIS on CPAP- reports consistent use,  following  Dr. Arnaldo Moise    ROS:  See HPI, all others negative    ROS random episodes of chills, no wt loss/appetite changes, no hot flashes/night sweates/ fever    Patient Active Problem List   Diagnosis Code    Pure hypercholesterolemia E78.00    Overactive bladder N32.81    Chronic allergic rhinitis J30.9    Anemia J28.7    Uncomplicated asthma J45.909    Type 2 diabetes mellitus without complication, without long-term current use of insulin ([facility]) E11.9       Current Outpatient Prescriptions   Medication Sig Dispense Refill    pantoprazole (PROTONIX) 40 mg tablet Take 1 Tab by mouth daily. 90 Tab 0    metFORMIN ER (GLUCOPHAGE XR) 500 mg tablet Take 2 Tabs by mouth daily (with dinner). 180 Tab 0    montelukast (SINGULAIR) 10 mg tablet Take 1 Tab by mouth daily. 90 Tab 2    cyclobenzaprine (FLEXERIL) 5 mg tablet Take 5 mg by mouth three (3) times daily as needed for Muscle Spasm(s).  aspirin delayed-release 81 mg tablet Take  by mouth daily.  cyanocobalamin (VITAMIN B12) 500 mcg tablet Take 500 mcg by mouth daily.  fluticasone-vilanterol (BREO ELLIPTA) 200-25 mcg/dose inhaler Take 1 Puff by inhalation daily.  atorvastatin (LIPITOR) 10 mg tablet Take 1 Tab by mouth daily. 90 Tab 0    oxybutynin (DITROPAN) 5 mg tablet Take 1 Tab by mouth two (2) times a day. 60 Tab 0    SITagliptin (JANUVIA) 50 mg tablet 50 mg.      gabapentin (NEURONTIN) 100 mg capsule 100 mg.      fluticasone (VERAMYST) 27.5 mcg/actuation nasal spray [place]  in Nose.  cycloSPORINE (RESTASIS) 0.05 % ophthalmic emulsion Instill 1 Drop in affected eye(s) Every 12 hours.  HYDROcodone-acetaminophen (NORCO) 5-325 mg per tablet   0    ipratropium-albuterol (COMBIVENT RESPIMAT)  mcg/actuation inhaler Take 1 inhalation inhaled by mouth Take As Needed.  ferrous sulfate 325 mg (65 mg iron) tablet 325 mg.      mometasone-formoterol (DULERA) 100-5 mcg/actuation HFA inhaler Take 2 Puffs by inhalation two (2) times a day.          Allergies   Allergen Reactions    Iodine Swelling    Nabumetone Swelling    Penicillins Unknown (comments)    Shellfish Containing Products Swelling    Sulfa (Sulfonamide Antibiotics) Swelling       Past Medical History:   Diagnosis Date    Allergic rhinitis     Anemia     Asthma     Candida vaginitis     CKD (chronic kidney disease) stage 3, GFR 30-59 ml/min     COPD (chronic obstructive pulmonary disease) (HCC)     Diabetes [facility])     Diabetes ([facility] [address]     GERD (gastroesophageal reflux disease)     h pylori gastritis egd 11/14 dr Amairani Landry    Headache     Hx of colonoscopy 11/05/2014    normal rpt 10 years, 11/2024, dr. Amairani Landry    Hypercholesterolemia     Mild dementia     OAB (overactive bladder)     CHRIS on CPAP     Radiculopathy     Urinary incontinence     UTI (urinary tract infection)        Social History     Social History    Marital status:      Spouse name: N/A    Number of children: N/A    Years of education: N/A     Occupational History    Not on file. Social History Main Topics    Smoking status: Former Smoker     Years: 20.00    Smokeless tobacco: Former User     Quit date: 1/1/1999    Alcohol use Yes    Drug use: No    Sexual activity: Not Currently     Partners: Male     Other Topics Concern    Not on file     Social History Narrative       History reviewed. No pertinent family history.       OBJECTIVE    Physical Exam:     Visit Vitals  /60 (BP 1 Location: Left arm, BP Patient Position: Sitting)   Temp 97.8 °F (36.6 °C) (Oral)   Resp 16   Ht 5' 1\" (1.549 m)   Wt 144 lb 9.6 oz (65.6 kg)   BMI 27.32 kg/m²       General: alert, AA, in no apparent distress or pain  HEENT: throat and pharynx clear, tm intact, eac patent  CVS: normal rate, regular rhythm, distinct S1 and S2  Lungs:clear to ausculation bilaterally, no crackles, wheezing or rhonchi noted  Psych:  mood and affect normal    Results for orders placed or performed during the hospital encounter of 03/04/19   CBC WITH AUTOMATED DIFF   Result Value Ref Range    WBC 4.3 (L) 4.6 - 13.2 K/uL    RBC 3.67 (L) 4.20 - 5.30 M/uL    HGB 9.5 (L) 12.0 - 16.0 g/dL    HCT 29.2 (L) 35.0 - 45.0 %    MCV 79.6 74.0 - 97.0 FL    MCH 25.9 24.0 - 34.0 PG    MCHC 32.5 31.0 - 37.0 g/dL    RDW 15.3 (H) 11.6 - 14.5 %    PLATELET 103 266 - 720 K/uL    MPV 9.1 (L) 9.2 - 11.8 FL    NEUTROPHILS 63 40 - 73 %    LYMPHOCYTES 27 21 - 52 %    MONOCYTES 7 3 - 10 %    EOSINOPHILS 3 0 - 5 %    BASOPHILS 0 0 - 2 %    ABS. NEUTROPHILS 2.8 1.8 - 8.0 K/UL    ABS. LYMPHOCYTES 1.2 0.9 - 3.6 K/UL    ABS. MONOCYTES 0.3 0.05 - 1.2 K/UL    ABS. EOSINOPHILS 0.1 0.0 - 0.4 K/UL    ABS. BASOPHILS 0.0 0.0 - 0.1 K/UL    DF AUTOMATED     HEMOGLOBIN A1C W/O EAG   Result Value Ref Range    Hemoglobin A1c 7.0 (H) 4.2 - 5.6 %   METABOLIC PANEL, BASIC   Result Value Ref Range    Sodium 145 136 - 145 mmol/L    Potassium 3.9 3.5 - 5.5 mmol/L    Chloride 110 (H) 100 - 108 mmol/L    CO2 28 21 - 32 mmol/L    Anion gap 7 3.0 - 18 mmol/L    Glucose 143 (H) 74 - 99 mg/dL    BUN 12 7.0 - 18 MG/DL    Creatinine 1.77 (H) 0.6 - 1.3 MG/DL    BUN/Creatinine ratio 7 (L) 12 - 20      GFR est AA 34 (L) >60 ml/min/1.73m2    GFR est non-AA 28 (L) >60 ml/min/1.73m2    Calcium 8.5 8.5 - 10.1 MG/DL   MICROALBUMIN, UR, RAND W/ MICROALB/CREAT RATIO   Result Value Ref Range    Microalbumin,urine random 3.19 (H) 0 - 3.0 MG/DL    Creatinine, urine 146.00 (H) 30 - 125 mg/dL    Microalbumin/Creat ratio (mg/g creat) 22 0 - 30 mg/g   LIPID PANEL   Result Value Ref Range    LIPID PROFILE          Cholesterol, total 186 <200 MG/DL    Triglyceride 72 <150 MG/DL    HDL Cholesterol 64 (H) 40 - 60 MG/DL    LDL, calculated 107.6 (H) 0 - 100 MG/DL    VLDL, calculated 14.4 MG/DL    CHOL/HDL Ratio 2.9 0 - 5.0           ASSESSMENT/PLAN  Diagnoses and all orders for this visit:    Recurrent  depression  Needs better control  Cont zoloft to 200 mg  Recent addition of  wellbutrin without much improvement.  Continue for 2 more weeks and see  Refer to psychiatrist and psychologist    Auditory hallucinations  H/o   Referral to psychiatry    Chest pain   No recent symptoms  +  risk factors DM/HTN/HL  EKG showing sinus bradycardia  Keep appt with cardiology  ED precautions discussed    Gait disturbance  Referral to neurology  Fall precautions/consistent cane use discussed    Frequent falls    Dizziness     Type 2 diabetes mellitus without complication, without long-term current use of insulin (HCC)  Controlled  cont januvia (off metformin due to worsening gfrl)       Anemia of chronic disease  Likely due to nephropathy  WBC slightly low  Will recheck cbc and labs: iron profile, vitamin b12, occult blood, hgb electrophoresis  Consider Nephrology consult     Pure hypercholesterolemia  Fairly good range on  lipitor  Lipid panel prior to next visit     Overactive bladder  Responding to ditropan, to cont    Weight loss  Reports tooth extractions past few months ? Contributing  TSH/CBC  Monitoring    Ff-up in 4 weeks or sooner prn    Patient understands plan of care. Patient has provided input and agrees with goals.

## 2019-03-11 LAB
DEPRECATED HGB OTHER BLD-IMP: 0 %
HGB A MFR BLD: 58.3 % (ref 96.4–98.8)
HGB A2 MFR BLD COLUMN CHROM: 3.7 % (ref 1.8–3.2)
HGB C MFR BLD: 0 %
HGB F MFR BLD: 0 % (ref 0–2)
HGB FRACT BLD-IMP: ABNORMAL
HGB S BLD QL SOLY: POSITIVE
HGB S MFR BLD: 38 %

## 2019-03-12 ENCOUNTER — HOSPITAL ENCOUNTER (OUTPATIENT)
Dept: LAB | Age: 69
Discharge: HOME | End: 2019-03-12
Payer: MEDICARE

## 2019-03-12 ENCOUNTER — OFFICE VISIT (OUTPATIENT)
Dept: CARDIOLOGY CLINIC | Age: 69
End: 2019-03-12

## 2019-03-12 VITALS
HEART RATE: 67 BPM | SYSTOLIC BLOOD PRESSURE: 104 MMHG | OXYGEN SATURATION: 94 % | HEIGHT: 61 IN | DIASTOLIC BLOOD PRESSURE: 62 MMHG | BODY MASS INDEX: 27 KG/M2 | WEIGHT: 143 LBS

## 2019-03-12 DIAGNOSIS — D64.9 ANEMIA, UNSPECIFIED TYPE: ICD-10-CM

## 2019-03-12 DIAGNOSIS — R53.82 CHRONIC FATIGUE: ICD-10-CM

## 2019-03-12 DIAGNOSIS — R00.2 PALPITATION: ICD-10-CM

## 2019-03-12 DIAGNOSIS — R07.9 CHEST PAIN, UNSPECIFIED TYPE: Primary | ICD-10-CM

## 2019-03-12 DIAGNOSIS — R42 LIGHTHEADEDNESS: ICD-10-CM

## 2019-03-12 DIAGNOSIS — D63.8 ANEMIA OF CHRONIC DISEASE: ICD-10-CM

## 2019-03-12 DIAGNOSIS — R79.89 ELEVATED TSH: Primary | ICD-10-CM

## 2019-03-12 PROCEDURE — 82274 ASSAY TEST FOR BLOOD FECAL: CPT

## 2019-03-12 NOTE — PROGRESS NOTES
There seems to be a genetic type of anemia she has and some iron deficiency picture  Advise hematology evaluation  Also thyroid function test appears to be hypothyroid, will need to recheck to confirm  Order placed  pls notify pt .

## 2019-03-12 NOTE — PROGRESS NOTES
HISTORY OF PRESENT ILLNESS  Jesús Batres is a 77 y.o. female. HPI    She is referred to my office for the evaluation of chest pain and palpitation. For the past few years, she has been experiencing occasional episodes of tightness and pressure sensation in the chest which has occurred mostly at rest lasting for a few seconds at a time except for one episode a few weeks ago which lasted for 3-4 minutes. She was standing at the counter when she experienced chest tightness. There was no arm pain or diaphoresis. She also complains of occasional fluttering in the chest as a flip flop lasting for a few seconds at a time. She has had no prolonged palpitation. She complains of dyspnea on exertion. She would have significant dyspnea by walking up one flight of stairs. She has had some dizziness but no syncope. She denies any symptoms of claudication, TIA or amaurosis fugax. She denies any history of hypertension. She has diabetes and hypercholesterolemia. She is known to have had renal dysfunction with a creatinine at 1.77. She also has a history of anemia of unknown etiology. She is a nonsmoker and she has no family history of coronary artery disease. She has had issues with depression and memory loss. Review of Systems   Constitutional: Negative for malaise/fatigue and weight loss. HENT: Negative for hearing loss. Eyes: Negative for blurred vision and double vision. Respiratory: Negative for shortness of breath. Cardiovascular: Positive for chest pain and palpitations. Negative for orthopnea, claudication, leg swelling and PND. Gastrointestinal: Negative for blood in stool, heartburn and melena. Genitourinary: Negative for dysuria, frequency, hematuria and urgency. Musculoskeletal: Negative for back pain and joint pain. Skin: Negative for itching and rash. Neurological: Positive for dizziness. Negative for loss of consciousness. Psychiatric/Behavioral: Positive for depression and memory loss. Physical Exam   Constitutional: She is oriented to person, place, and time. She appears well-developed and well-nourished. HENT:   Head: Normocephalic and atraumatic. Eyes: Conjunctivae are normal. Pupils are equal, round, and reactive to light. Neck: Normal range of motion. Neck supple. No JVD present. Cardiovascular: Normal rate, regular rhythm, S1 normal and S2 normal.  No extrasystoles are present. PMI is not displaced. Exam reveals no gallop and no friction rub. No murmur heard. Pulses:       Carotid pulses are 3+ on the right side, and 3+ on the left side. Pulmonary/Chest: Effort normal. She has no rales. Abdominal: Soft. There is no tenderness. Musculoskeletal: She exhibits no edema. Neurological: She is alert and oriented to person, place, and time. No cranial nerve deficit. Skin: Skin is warm and dry. Psychiatric: She has a normal mood and affect.  Her behavior is normal.     Visit Vitals  /62   Pulse 67   Ht 5' 1\" (1.549 m)   Wt 64.9 kg (143 lb)   SpO2 94%   BMI 27.02 kg/m²       Past Medical History:   Diagnosis Date    Allergic rhinitis     Anemia     Asthma     Candida vaginitis     CKD (chronic kidney disease) stage 3, GFR 30-59 ml/min (HCC)     COPD (chronic obstructive pulmonary disease) (HCC)     Diabetes (HCC)     Diabetes (HCC)     GERD (gastroesophageal reflux disease)     h pylori gastritis egd 11/14 dr Nadir Caraballo    Headache     Hx of colonoscopy 11/05/2014    normal rpt 10 years, 11/2024, dr. Nadir Caraballo    Hypercholesterolemia     Mild dementia     OAB (overactive bladder)     CHRIS on CPAP     Radiculopathy     Urinary incontinence     UTI (urinary tract infection)        Social History     Socioeconomic History    Marital status:      Spouse name: Not on file    Number of children: Not on file    Years of education: Not on file    Highest education level: Not on file   Social Needs    Financial resource strain: Not on file   [employer] insecurity - worry: Not on file    Food insecurity - inability: Not on file    Transportation needs - medical: Not on file   [employer] needs - non-medical: Not on file   Occupational History    Not on file   Tobacco Use    Smoking status: Former Smoker     Years: 20.00    Smokeless tobacco: Former User     Quit date: 1/1/1999   Substance and Sexual Activity    Alcohol use: Yes    Drug use: No    Sexual activity: Not Currently     Partners: Male   Other Topics Concern    Not on file   Social History Narrative    Not on file       History reviewed. No pertinent family history. Past Surgical History:   Procedure Laterality Date    HX COLONOSCOPY  11/05/2014    Normal/Repeat in 10 years/Dr. Elvira Mc    HX HYSTERECTOMY  1970    HX ORTHOPAEDIC Right 02/2012    knee replacement        Current Outpatient Medications   Medication Sig Dispense Refill    oxybutynin (DITROPAN) 5 mg tablet Take 1 Tab by mouth two (2) times a day. 90 Tab 2    SITagliptin (JANUVIA) 50 mg tablet Take 1 Tab by mouth daily. 90 Tab 2    montelukast (SINGULAIR) 10 mg tablet Take 1 Tab by mouth daily. 90 Tab 2    atorvastatin (LIPITOR) 10 mg tablet TAKE 1 TABLET BY MOUTH ONCE DAILY 90 Tab 3    sertraline (ZOLOFT) 100 mg tablet Take 2 Tabs by mouth daily. 180 Tab 0    pantoprazole (PROTONIX) 40 mg tablet TAKE 1 TABLET BY MOUTH ONCE DAILY 90 Tab 3    buPROPion XL (WELLBUTRIN XL) 150 mg tablet Take 1 Tab by mouth every morning. 30 Tab 0    fluticasone (FLOVENT DISKUS) 50 mcg/actuation inhaler Take  by inhalation.  sodium chloride (OCEAN) 0.65 % nasal squeeze bottle 1 Spray as needed for Congestion.  cholecalciferol (VITAMIN D3) 400 unit tab tablet Take  by mouth daily.  cyclobenzaprine (FLEXERIL) 5 mg tablet Take 5 mg by mouth three (3) times daily as needed for Muscle Spasm(s).  aspirin delayed-release 81 mg tablet Take  by mouth daily.       cyanocobalamin (VITAMIN B12) 500 mcg tablet Take 500 mcg by mouth daily.  fluticasone-vilanterol (BREO ELLIPTA) 200-25 mcg/dose inhaler Take 1 Puff by inhalation daily.  fluticasone (VERAMYST) 27.5 mcg/actuation nasal spray [place]  in Nose.  HYDROcodone-acetaminophen (NORCO) 5-325 mg per tablet   0    ipratropium-albuterol (COMBIVENT RESPIMAT)  mcg/actuation inhaler Take 1 inhalation inhaled by mouth Take As Needed.  docusate sodium (COLACE) 50 mg capsule Take 50 mg by mouth two (2) times a day.  B2/magnesium cit,oxid/feverfew (MIGRELIEF PO) Take  by mouth.  prednisoLONE acetate (PRED FORTE) 1 % ophthalmic suspension Administer 1 Drop to both eyes four (4) times daily.  cycloSPORINE (RESTASIS) 0.05 % ophthalmic emulsion Instill 1 Drop in affected eye(s) Every 12 hours. EKG: normal EKG, normal sinus rhythm, unchanged from previous tracings, nonspecific ST and T waves changes. ASSESSMENT and PLAN  Encounter Diagnoses   Name Primary?  Chest pain, unspecified type Yes    Palpitation     Lightheadedness    This patient does not have physical findings or EKG evidence of structural heart disease. Her chest pain sounds atypical and probably noncardiac in nature. However, she does have increased risk factors for coronary artery disease and at this point, I would proceed with a stress test. She is unable to exercise well on the treadmill and I would proceed with pharmacologic cardiac nuclear stress imaging. Her palpitation seems to be related to benign PVCs or PACs which is not significant enough to warrant further diagnostic workup at this time. She has had issues with memory loss which is not noticeable on today's examination. She does have renal dysfunction with a creatinine of 1.77 and also moderately severe anemia of perhaps chronic disease and this will be followed by her primary care physician. The patient was made aware of her renal dysfunction and anemia and advised to follow up with her primary care physician.

## 2019-03-12 NOTE — PROGRESS NOTES
Narayan Wilkins presents today for   Chief Complaint   Patient presents with   [facility] New Patient     referred by Dr. Josef Su for lightheadedness and chest pain    Chest Pain     tightness and pressure in the center radiates to right arm 4 times a week for the past month last episode yesterday usually lasts for a couple of seconds     Palpitations     racing twice a week 2 years    Shortness of Breath     everyday with and without exertion     Dizziness     everyday on changing positions for the past 6-8 months     Leg Swelling     both feet on/off        Narayan Wilkins preferred language for health care discussion is english/other. Is someone accompanying this pt? No     Is the patient using any DME equipment during OV?  No     Depression Screening:  3 most recent PHQ Screens 7/27/2018   Little interest or pleasure in doing things More than half the days   Feeling down, depressed, irritable, or hopeless More than half the days   Total Score PHQ 2 4   Trouble falling or staying asleep, or sleeping too much More than half the days   Feeling tired or having little energy More than half the days   Poor appetite, weight loss, or overeating More than half the days   Feeling bad about yourself - or that you are a failure or have let yourself or your family down Several days   Trouble concentrating on things such as school, work, reading, or watching TV More than half the days   Moving or speaking so slowly that other people could have noticed; or the opposite being so fidgety that others notice More than half the days   Thoughts of being better off dead, or hurting yourself in some way Not at all   PHQ 9 Score 15   How difficult have these problems made it for you to do your work, take care of your home and get along with others Somewhat difficult       Learning Assessment:  Learning Assessment 3/8/2019   PRIMARY LEARNER Patient   HIGHEST LEVEL OF EDUCATION - PRIMARY LEARNER  DID NOT GRADUATE HIGH SCHOOL   BARRIERS PRIMARY LEARNER NONE   CO-LEARNER CAREGIVER No   PRIMARY LANGUAGE ENGLISH    NEED No   LEARNER PREFERENCE PRIMARY DEMONSTRATION     LISTENING   LEARNING SPECIAL TOPICS No   ANSWERED BY patient   RELATIONSHIP SELF       Abuse Screening:  Abuse Screening Questionnaire 3/8/2019   Do you ever feel afraid of your partner? N   Are you in a relationship with someone who physically or mentally threatens you? N   Is it safe for you to go home? Y       Fall Risk  Fall Risk Assessment, last 12 mths 7/27/2018   Able to walk? Yes   Fall in past 12 months? Yes   Fall with injury? No   Number of falls in past 12 months 5   Fall Risk Score 5       Pt currently taking Antiplatelet therapy? ASA     Coordination of Care:  1. Have you been to the ER, urgent care clinic since your last visit? Hospitalized since your last visit? No     2. Have you seen or consulted any other health care providers outside of the [address] [place] [address] since your last visit? Include any pap smears or colon screening.  No

## 2019-03-13 NOTE — PROGRESS NOTES
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 Patient was advised she will be referred to hematology for evaluation; aware hematology office will contact her directly to schedule. Also, patient was advised thyroid function test appears to be hypothyroid, will need to recheck to confirm. Patient verbalized understanding.  She is aware lab order will be mailed to her and told she must complete labs no later than one week prior to next visit with Dr. Zeferino Garsia, which is scheduled for 4/05/19 at 1:00PM.

## 2019-03-19 LAB — HEMOCCULT STL QL IA: NEGATIVE

## 2019-03-25 ENCOUNTER — HOSPITAL ENCOUNTER (OUTPATIENT)
Dept: NON INVASIVE DIAGNOSTICS | Age: 69
Discharge: HOME | End: 2019-03-25
Attending: INTERNAL MEDICINE
Payer: MEDICARE

## 2019-03-25 VITALS
DIASTOLIC BLOOD PRESSURE: 60 MMHG | WEIGHT: 143 LBS | BODY MASS INDEX: 27 KG/M2 | SYSTOLIC BLOOD PRESSURE: 110 MMHG | HEIGHT: 61 IN

## 2019-03-25 DIAGNOSIS — R07.9 CHEST PAIN, UNSPECIFIED TYPE: ICD-10-CM

## 2019-03-25 LAB
STRESS BASELINE DIAS BP: 60 MMHG
STRESS BASELINE HR: 60 BPM
STRESS BASELINE SYS BP: 100 MMHG
STRESS ESTIMATED WORKLOAD: 1 METS
STRESS EXERCISE DUR MIN: NORMAL
STRESS PEAK DIAS BP: 60 MMHG
STRESS PEAK SYS BP: 120 MMHG
STRESS PERCENT HR ACHIEVED: 58 %
STRESS POST PEAK HR: 87 BPM
STRESS RATE PRESSURE PRODUCT: NORMAL BPM*MMHG
STRESS ST DEPRESSION: 0 MM
STRESS ST ELEVATION: 0 MM
STRESS TARGET HR: 150 BPM

## 2019-03-25 PROCEDURE — 93017 CV STRESS TEST TRACING ONLY: CPT

## 2019-03-25 PROCEDURE — 74011250636 HC RX REV CODE- 250/636: Performed by: INTERNAL MEDICINE

## 2019-03-25 RX ORDER — SODIUM CHLORIDE 9 MG/ML
250 INJECTION, SOLUTION INTRAVENOUS ONCE
Status: COMPLETED | OUTPATIENT
Start: 2019-03-25 | End: 2019-03-25

## 2019-03-25 RX ADMIN — REGADENOSON 0.4 MG: 0.08 INJECTION, SOLUTION INTRAVENOUS at 10:30

## 2019-03-25 RX ADMIN — SODIUM CHLORIDE 250 ML: 900 INJECTION, SOLUTION INTRAVENOUS at 10:30

## 2019-04-02 ENCOUNTER — OFFICE VISIT (OUTPATIENT)
Dept: NEUROLOGY | Age: 69
End: 2019-04-02

## 2019-04-02 VITALS
BODY MASS INDEX: 27.75 KG/M2 | RESPIRATION RATE: 18 BRPM | OXYGEN SATURATION: 99 % | TEMPERATURE: 97.7 F | SYSTOLIC BLOOD PRESSURE: 110 MMHG | HEART RATE: 65 BPM | WEIGHT: 147 LBS | DIASTOLIC BLOOD PRESSURE: 50 MMHG | HEIGHT: 61 IN

## 2019-04-02 DIAGNOSIS — R42 LIGHTHEADEDNESS: Primary | ICD-10-CM

## 2019-04-02 DIAGNOSIS — R26.9 GAIT DISTURBANCE: ICD-10-CM

## 2019-04-02 DIAGNOSIS — G31.84 MCI (MILD COGNITIVE IMPAIRMENT): ICD-10-CM
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
## 2019-04-02 DIAGNOSIS — R42 DIZZY: ICD-10-CM

## 2019-04-02 NOTE — PROGRESS NOTES
Chief Complaint   Patient presents with    Neurologic Problem     follow up     Fall Risk Assessment, last 12 mths 4/2/2019   Able to walk? Yes   Fall in past 12 months? Yes   Fall with injury?  No   Number of falls in past 12 months 3   Fall Risk Score 3

## 2019-04-02 NOTE — PATIENT INSTRUCTIONS
Preventing Falls: After Your Visit   Your Care Instructions   Getting around your home safely can be a challenge if you have injuries or health problems that make it easy for you to fall. Loose rugs and furniture in walkways are among the dangers for many older people who have problems walking or who have poor eyesight. People who have conditions such as arthritis, osteoporosis, or dementia also have to be careful not to fall. You can make your home safer with a few simple measures. Follow-up care is a key part of your treatment and safety. Be sure to make and go to all appointments, and call your doctor if you are having problems. It's also a good idea to know your test results and keep a list of the medicines you take. How can you care for yourself at home? Taking care of yourself   You may get dizzy if you do not drink enough water. To prevent dehydration, drink plenty of fluids, enough so that your urine is light yellow or clear like water. Choose water and other caffeine-free clear liquids. If you have kidney, heart, or liver disease and have to limit fluids, talk with your doctor before you increase the amount of fluids you drink. Exercise regularly to improve your strength, muscle tone, and balance. Walk if you can. Swimming may be a good choice if you cannot walk easily. Have your vision and hearing checked each year or any time you notice a change. If you have trouble seeing and hearing, you might not be able to avoid objects and could lose your balance. Know the side effects of the medicines you take. Ask your doctor or pharmacist whether the medicines you take can affect your balance. Sleeping pills or sedatives can affect your balance. Limit the amount of alcohol you drink. Alcohol can impair your balance and other senses. Ask your doctor whether calluses or corns on your feet need to be removed.  If you wear loose-fitting shoes because of calluses or corns, you can lose your balance and fall.   Talk to your doctor if you have numbness in your feet. Preventing falls at home   Remove raised doorway thresholds, throw rugs, and clutter. Repair loose carpet or raised areas in the floor. Move furniture and electrical cords to keep them out of walking paths. Use nonskid floor wax, and wipe up spills right away, especially on ceramic tile floors. If you use a walker or cane, put rubber tips on it. If you use crutches, clean the bottoms of them regularly with an abrasive pad, such as steel wool. Keep your house well lit, especially stairways, porches, and outside walkways. Use night-lights in areas such as hallways and bathrooms. Add extra light switches or use remote switches (such as switches that go on or off when you clap your hands) to make it easier to turn lights on if you have to get up during the night. Install sturdy handrails on stairways. Move items in your cabinets so that the things you use a lot are on the lower shelves (about waist level). Keep a cordless phone and a flashlight with new batteries by your bed. If possible, put a phone in each of the main rooms of your house, or carry a cell phone in case you fall and cannot reach a phone. Or, you can wear a device around your neck or wrist. You push a button that sends a signal for help. Wear low-heeled shoes that fit well and give your feet good support. Use footwear with nonskid soles. Check the heels and soles of your shoes for wear. Repair or replace worn heels or soles. Do not wear socks without shoes on wood floors. Walk on the grass when the sidewalks are slippery. If you live in an area that gets snow and ice in the winter, sprinkle salt on slippery steps and sidewalks. Preventing falls in the bath   Install grab bars and nonskid mats inside and outside your shower or tub and near the toilet and sinks. Use shower chairs and bath benches. Use a hand-held shower head that will allow you to sit while showering. Get into a tub or shower by putting the weaker leg in first. Get out of a tub or shower with your strong side first.   Repair loose toilet seats and consider installing a raised toilet seat to make getting on and off the toilet easier. Keep your bathroom door unlocked while you are in the shower. Where can you learn more? Go to [employer]   Enter G117 in the search box to learn more about \"Preventing Falls: After Your Visit. \"    © 4062-6252 [employer], [employer]orporated. Care instructions adapted under license by [employer] (which disclaims liability or warranty for this information). This care instruction is for use with your licensed healthcare professional. If you have questions about a medical condition or this instruction, always ask your healthcare professional. [address] any warranty or liability for your use of this information. Content Version: 10.1.291354; Current as of: May 15, 2013              Thank you for choosing [employer] and [facility] for your     care. You may receive a survey about your visit. We appreciate you taking time     to complete this survey as we use your feedback to improve our services. We     realize we are not perfect, but we strive to provide excellent care.

## 2019-04-02 NOTE — PROGRESS NOTES
[address], [address], [place], [address]  27 Ankita Ramsay. [facility], [address]  Office:  [phone]  Fax: [phone]  Chief Complaint   Patient presents with    Neurologic Problem     follow up     This is a 70-year-old female who presents for follow-up falls and dizziness. Significant medical history including mild cognitive impairment, diabetes mellitus, and depression. Last seen in office back in July of 2018. Symptoms started several years ago. Associated with dizziness upon standing. Feels this is more of a sensation of lightheadedness. She has fallen. She will get up and \"just keep going. \"  Having blurred vision. Says she needs to make an eye appointment. She missed this in December. Denies hitting her head or loss of consciousness. Denies waking up on the floor unsure how she got there. She does mention an episode of palpitations. She was recently seen by cardiologist.  She follows up next week. Denies chest pain. Denies diplopia. Denies vision loss. Previously seen for memory loss diagnosed with mild cognitive impairment with previous neuropsychological evaluation from [facility]. Last evaluation March 2018. History of depression, hallucinations and is seen by psychiatrist Dr. Dustin Marroquin. Previous head CT from November 2017 for complaints of dizziness. It was recommended she no longer drive. She denies driving any longer. Denies subsequent hallucinations, SI/HI. She has not completed physical therapy of recent. It was offered to her by her orthopedist but she denied this. She had home health coming out but told them to no longer come due to her concerns for work and what they were actually doing. She said \"they just sat on the couch. \" She is finding another agency. She lives alone. She has history of R foot drop and wears AFO. She follows with podiatry.      Past Medical History:   Diagnosis Date    Allergic rhinitis     Anemia     Asthma     Candida vaginitis     CKD (chronic kidney disease) stage 3, GFR 30-59 ml/min ([facility]     COPD (chronic obstructive pulmonary disease) ([facility])     [facility]     Diabetes [facility]     GERD (gastroesophageal reflux disease)     h pylori gastritis egd 11/14 dr Charmaine Estrella    Headache     Hx of colonoscopy 11/05/2014    normal rpt 10 years, 11/2024, dr. Charmaine Estrella    Hypercholesterolemia     Mild dementia     OAB (overactive bladder)     CHRIS on CPAP     Radiculopathy     Urinary incontinence     UTI (urinary tract infection)        Past Surgical History:   Procedure Laterality Date    HX COLONOSCOPY  11/05/2014    Normal/Repeat in 10 years/Dr. Mariann Gomez    HX HYSTERECTOMY  1970    HX ORTHOPAEDIC Right 02/2012    knee replacement        Current Outpatient Medications   Medication Sig Dispense Refill    olopatadine (PAZEO) 0.7 % drop Apply  to eye.  SITagliptin (JANUVIA) 50 mg tablet Take 1 Tab by mouth daily. 90 Tab 2    montelukast (SINGULAIR) 10 mg tablet Take 1 Tab by mouth daily. 90 Tab 2    atorvastatin (LIPITOR) 10 mg tablet TAKE 1 TABLET BY MOUTH ONCE DAILY 90 Tab 3    sertraline (ZOLOFT) 100 mg tablet Take 2 Tabs by mouth daily. 180 Tab 0    pantoprazole (PROTONIX) 40 mg tablet TAKE 1 TABLET BY MOUTH ONCE DAILY 90 Tab 3    fluticasone (FLOVENT DISKUS) 50 mcg/actuation inhaler Take  by inhalation.  sodium chloride (OCEAN) 0.65 % nasal squeeze bottle 1 Spray as needed for Congestion.  docusate sodium (COLACE) 50 mg capsule Take 50 mg by mouth two (2) times a day.  cholecalciferol (VITAMIN D3) 400 unit tab tablet Take  by mouth daily.  B2/magnesium cit,oxid/feverfew (MIGRELIEF PO) Take  by mouth.  cyclobenzaprine (FLEXERIL) 5 mg tablet Take 5 mg by mouth three (3) times daily as needed for Muscle Spasm(s).  aspirin delayed-release 81 mg tablet Take  by mouth daily.  cyanocobalamin (VITAMIN B12) 500 mcg tablet Take 500 mcg by mouth daily.       fluticasone-vilanterol (BREO ELLIPTA) 200-25 mcg/dose inhaler Take 1 Puff by inhalation daily.  fluticasone (VERAMYST) 27.5 mcg/actuation nasal spray [place]  in Nose.  HYDROcodone-acetaminophen (NORCO) 5-325 mg per tablet   0    ipratropium-albuterol (COMBIVENT RESPIMAT)  mcg/actuation inhaler Take 1 inhalation inhaled by mouth Take As Needed.  oxybutynin (DITROPAN) 5 mg tablet Take 1 Tab by mouth two (2) times a day. 90 Tab 2    buPROPion XL (WELLBUTRIN XL) 150 mg tablet Take 1 Tab by mouth every morning. 30 Tab 0    prednisoLONE acetate (PRED FORTE) 1 % ophthalmic suspension Administer 1 Drop to both eyes four (4) times daily.  cycloSPORINE (RESTASIS) 0.05 % ophthalmic emulsion Instill 1 Drop in affected eye(s) Every 12 hours. Allergies   Allergen Reactions    Flexeril [Cyclobenzaprine] Other (comments)     Patient states not allergic to this medication.  Iodine Swelling    Nabumetone Swelling    Penicillins Unknown (comments)    Shellfish Containing Products Swelling    Sulfa (Sulfonamide Antibiotics) Swelling    Tramadol Itching       Social History     Tobacco Use    Smoking status: Former Smoker     Years: 20.00    Smokeless tobacco: Former User     Quit date: 1/1/1999   Substance Use Topics    Alcohol use: Yes    Drug use: No       History reviewed. No pertinent family history. Review of Systems  GENERAL: Denies fever or fatigue  CARDIAC: No CP or SOB  PULMONARY: No cough of SOB  MUSCULOSKELETAL: No new joint pain  NEURO: SEE HPI    Examination  Visit Vitals  /50 (BP 1 Location: Left arm, BP Patient Position: Sitting)   Pulse 65   Temp 97.7 °F (36.5 °C)   Resp 18   Ht 5' 1\" (1.549 m)   Wt 66.7 kg (147 lb)   SpO2 99%   BMI 27.78 kg/m²       This is a very pleasant 78-year-old female she is alert and in no apparent distress. Speech is clear and she is conversant. She is oriented to time, place, and current president. Heart is regular.  EOM's are full, PERRL, no facial asymmetries. Strength and tone are normal. DTR's +2, gait symmetric. L AFO in place. No signs of incoordination or ataxia. Impression/Plan  Asia Miles is a 77 y.o. female whose history and physical are consistent with dizziness, lightheadedness, and falls. Patient presents for follow-up dizziness. She endorses this has been a complaint for several years now. She endorses recent falling. She says she mostly falls after she gets out from a seated position. She did have an episode of palpitations. She was recently seen by cardiology. Denies loss of consciousness, diplopia, or focal deficits. She has no recent head imaging. She does endorse worsening lightheadedness  At this time it would be prudent to obtain imaging of the brain looking for any ischemic etiology that would be consistent with her dizziness. Obtain EEG looking for any underlying abnormality or epileptiform. Obtain carotid Doppler looking for any significant stenosis due to reports of lightheadedness. We discussed safety. Review of medications reveals that she is on muscle relaxer and this can contribute to risk of falls. Defer management to prescribing physician. She does have a history of diabetes mellitus and certainly there can be an underlying sensory ataxia that may be contributing to risk of falls. She endorses tight glucose control. She is seen by Dr. Ruelas Self her psychiatrist for depression. Denies subsequent hallucinations, SI/HI. She does have previous diagnosis of mild cognitive impairment with neuropsychological evaluation. Denies dangerous behaviors. We will follow-up after testing is complete. All questions addressed and patient is agreeable with plan of care. Diagnoses and all orders for this visit:    1. Lightheadedness  -     MRI BRAIN WO CONT; Future  -     EEG AWAKE; Future  -     DUPLEX CAROTID BILATERAL; Future    2.  Fall, subsequent encounter  -     MRI BRAIN WO CONT; Future  -     EEG AWAKE; Future  -     DUPLEX CAROTID BILATERAL; Future    3. Dizzy  -     MRI BRAIN WO CONT; Future  -     EEG AWAKE; Future  -     DUPLEX CAROTID BILATERAL; Future    4. Gait disturbance  -     MRI BRAIN WO CONT; Future  -     EEG AWAKE; Future  -     DUPLEX CAROTID BILATERAL; Future    5. MCI (mild cognitive impairment)  -     MRI BRAIN WO CONT; Future  -     EEG AWAKE; Future  -     DUPLEX CAROTID BILATERAL; Future      Total time: 30 min   Counseling / coordination time: 25 min   > 50% counseling / coordination?: Yes re: symptoms, management, safety, answer questions, and plan of care. Signed By: Cristhian Villalba NP    This note will not be viewable in [address] PLEASE NOTE:   Portions of this document may have been produced using voice recognition software. Unrecognized errors in transcription may be present.

## 2019-04-05 ENCOUNTER — HOSPITAL ENCOUNTER (OUTPATIENT)
Dept: LAB | Age: 69
Discharge: HOME | End: 2019-04-05
Payer: MEDICARE

## 2019-04-05 DIAGNOSIS — R53.82 CHRONIC FATIGUE: ICD-10-CM

## 2019-04-05 DIAGNOSIS — R79.89 ELEVATED TSH: ICD-10-CM

## 2019-04-05 LAB
T3FREE SERPL-MCNC: 2.3 PG/ML (ref 2.18–3.98)
T4 FREE SERPL-MCNC: 0.7 NG/DL (ref 0.7–1.5)
TSH SERPL DL<=0.05 MIU/L-ACNC: 3.4 UIU/ML (ref 0.36–3.74)

## 2019-04-05 PROCEDURE — 36415 COLL VENOUS BLD VENIPUNCTURE: CPT

## 2019-04-05 PROCEDURE — 84443 ASSAY THYROID STIM HORMONE: CPT

## 2019-04-05 PROCEDURE — 84481 FREE ASSAY (FT-3): CPT

## 2019-04-05 PROCEDURE — 86376 MICROSOMAL ANTIBODY EACH: CPT

## 2019-04-05 PROCEDURE — 84439 ASSAY OF FREE THYROXINE: CPT

## 2019-04-07 LAB — THYROPEROXIDASE AB SERPL-ACNC: 587 IU/ML (ref 0–34)

## 2019-04-08 DIAGNOSIS — E11.9 TYPE 2 DIABETES MELLITUS WITHOUT COMPLICATION, WITHOUT LONG-TERM CURRENT USE OF INSULIN (HCC): ICD-10-CM

## 2019-04-08 DIAGNOSIS — R79.89 ELEVATED TSH: Primary | ICD-10-CM

## 2019-04-08 DIAGNOSIS — R41.3 MEMORY DIFFICULTY: ICD-10-CM

## 2019-04-08 DIAGNOSIS — F33.9 RECURRENT DEPRESSION (HCC): ICD-10-CM

## 2019-04-08 NOTE — PROGRESS NOTES
+ antibodies toward the thyroid gland, which is typically found with hypothyroidism. Your repeat thyroid level is normal, will still recommend endocrinology consult for further evaluation, especially with increasing depression and forgetfullness. pls notify pt.

## 2019-04-15 ENCOUNTER — PATIENT OUTREACH (OUTPATIENT)
Dept: FAMILY MEDICINE CLINIC | Age: 69
End: 2019-04-15

## 2019-04-23 NOTE — PROGRESS NOTES
Patient identified with 2 identifiers (name and ). Patient aware of     + antibodies toward the thyroid gland, which is typically found with hypothyroidism.  Your repeat thyroid level is normal, will still recommend endocrinology consult for further evaluation, especially with increasing depression and forgetfullness

## 2019-05-07 ENCOUNTER — HOSPITAL ENCOUNTER (OUTPATIENT)
Dept: MRI IMAGING | Age: 69
Discharge: HOME | End: 2019-05-07
Attending: NURSE PRACTITIONER
Payer: MEDICARE

## 2019-05-07 ENCOUNTER — HOSPITAL ENCOUNTER (OUTPATIENT)
Dept: VASCULAR SURGERY | Age: 69
Discharge: HOME | End: 2019-05-07
Attending: NURSE PRACTITIONER
Payer: MEDICARE

## 2019-05-07 ENCOUNTER — HOSPITAL ENCOUNTER (OUTPATIENT)
Dept: NEUROLOGY | Age: 69
Discharge: HOME | End: 2019-05-07
Attending: NURSE PRACTITIONER
Payer: MEDICARE

## 2019-05-07 DIAGNOSIS — R42 LIGHTHEADEDNESS: ICD-10-CM

## 2019-05-07 DIAGNOSIS — R42 DIZZY: ICD-10-CM
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
## 2019-05-07 DIAGNOSIS — G31.84 MCI (MILD COGNITIVE IMPAIRMENT): ICD-10-CM

## 2019-05-07 DIAGNOSIS — R26.9 GAIT DISTURBANCE: ICD-10-CM

## 2019-05-07 PROCEDURE — 70551 MRI BRAIN STEM W/O DYE: CPT

## 2019-05-07 PROCEDURE — 93880 EXTRACRANIAL BILAT STUDY: CPT

## 2019-05-08 LAB
LEFT CCA DIST DIAS: 18.7 CM/S
LEFT CCA DIST SYS: 70.4 CM/S
LEFT CCA MID DIAS: 21.97 CM/S
LEFT CCA MID SYS: 99.51 CM/S
LEFT CCA PROX DIAS: 20.4 CM/S
LEFT CCA PROX SYS: 91 CM/S
LEFT ECA DIAS: 0 CM/S
LEFT ECA SYS: 61 CM/S
LEFT ICA DIST DIAS: 17.6 CM/S
LEFT ICA DIST SYS: 70.7 CM/S
LEFT ICA MID DIAS: 26.7 CM/S
LEFT ICA MID SYS: 82.3 CM/S
LEFT ICA PROX DIAS: 20.6 CM/S
LEFT ICA PROX SYS: 90.5 CM/S
LEFT ICA/CCA SYS: 1.28
LEFT SUBCLAVIAN SYS: 74.9 CM/S
LEFT VERTEBRAL DIAS: 21.48 CM/S
LEFT VERTEBRAL SYS: 81 CM/S
RIGHT CCA DIST DIAS: 13.8 CM/S
RIGHT CCA DIST SYS: 66 CM/S
RIGHT CCA MID DIAS: 13.83 CM/S
RIGHT CCA MID SYS: 93.39 CM/S
RIGHT CCA PROX DIAS: 0 CM/S
RIGHT CCA PROX SYS: 75.5 CM/S
RIGHT ECA DIAS: 0 CM/S
RIGHT ECA SYS: 47.5 CM/S
RIGHT ICA DIST DIAS: 20 CM/S
RIGHT ICA DIST SYS: 67.3 CM/S
RIGHT ICA MID DIAS: 23.4 CM/S
RIGHT ICA MID SYS: 72.6 CM/S
RIGHT ICA PROX DIAS: 16.1 CM/S
RIGHT ICA PROX SYS: 58.1 CM/S
RIGHT ICA/CCA SYS: 1.1
RIGHT SUBCLAVIAN SYS: 65.3 CM/S
RIGHT VERTEBRAL DIAS: 18.9 CM/S
RIGHT VERTEBRAL SYS: 83.6 CM/S

## 2019-05-10 NOTE — PROCEDURES
[facility]  EEG    Name:  Gino Coombs  MR#:   [id number]  :  1950  ACCOUNT #:  [de-identified]  DATE OF SERVICE:  2019    REFERRING PROVIDER:  Kacie Dumont NP    EEG Number:  Ralph Love    CLINICAL HISTORY:  This is an apparently wakeful EEG on a 77year-old patient, who presents with memory loss. She has had a progressive memory loss for about six years. MEDICATIONS:  Include Zoloft, Protonix, Glucophage, Lipitor, Ditropan, Neurontin, Norco, Restasis, aspirin. EEG REPORT:  The predominant apparently wakeful background consists of 20-25 microvolt, sinusoidal and symmetrical, 9-10 Hz waves which attenuate well with eye opening. Bifrontal 3-5 microvolts, 16-20 Hz waves are seen, which are symmetrical in their occurrence. Step flash photic stimulation caused driving between 8-03 flashes per second. IMPRESSION:  This is a normal wakeful EEG. No epileptiform or focal abnormality was identified.       Jose Mcdaniel MD      [id number]  D:  2019 15:37  T:  05/10/2019 8:34  JOB #:  [id number]  CC:  Kacie Dumont NP

## 2019-05-13 ENCOUNTER — OFFICE VISIT (OUTPATIENT)
Dept: FAMILY MEDICINE CLINIC | Age: 69
End: 2019-05-13

## 2019-05-13 VITALS
TEMPERATURE: 98.5 F | RESPIRATION RATE: 16 BRPM | BODY MASS INDEX: 27.75 KG/M2 | SYSTOLIC BLOOD PRESSURE: 128 MMHG | OXYGEN SATURATION: 97 % | HEART RATE: 62 BPM | WEIGHT: 147 LBS | DIASTOLIC BLOOD PRESSURE: 64 MMHG | HEIGHT: 61 IN

## 2019-05-13 DIAGNOSIS — E11.21 TYPE 2 DIABETES WITH NEPHROPATHY (HCC): ICD-10-CM

## 2019-05-13 DIAGNOSIS — N32.81 OVERACTIVE BLADDER: ICD-10-CM

## 2019-05-13 DIAGNOSIS — D63.8 ANEMIA OF CHRONIC DISEASE: ICD-10-CM

## 2019-05-13 DIAGNOSIS — I65.23 BILATERAL CAROTID ARTERY STENOSIS: ICD-10-CM

## 2019-05-13 DIAGNOSIS — D57.3 SICKLE CELL TRAIT (HCC): ICD-10-CM

## 2019-05-13 DIAGNOSIS — R29.6 FREQUENT FALLS: ICD-10-CM

## 2019-05-13 DIAGNOSIS — Z00.00 MEDICARE ANNUAL WELLNESS VISIT, SUBSEQUENT: Primary | ICD-10-CM

## 2019-05-13 DIAGNOSIS — F33.9 RECURRENT DEPRESSION (HCC): ICD-10-CM

## 2019-05-13 DIAGNOSIS — R26.9 GAIT DISTURBANCE: ICD-10-CM

## 2019-05-13 DIAGNOSIS — E78.00 PURE HYPERCHOLESTEROLEMIA: ICD-10-CM

## 2019-05-13 DIAGNOSIS — I67.89 CEREBRAL MICROVASCULAR DISEASE: ICD-10-CM

## 2019-05-13 DIAGNOSIS — E11.9 TYPE 2 DIABETES MELLITUS WITHOUT COMPLICATION, WITHOUT LONG-TERM CURRENT USE OF INSULIN (HCC): ICD-10-CM

## 2019-05-13 DIAGNOSIS — J42 CHRONIC BRONCHITIS, UNSPECIFIED CHRONIC BRONCHITIS TYPE (HCC): ICD-10-CM

## 2019-05-13 DIAGNOSIS — R79.89 ABNORMAL TSH: ICD-10-CM

## 2019-05-13 NOTE — PATIENT INSTRUCTIONS
Medicare Wellness Visit, Female     The best way to live healthy is to have a lifestyle where you eat a well-balanced diet, exercise regularly, limit alcohol use, and quit all forms of tobacco/nicotine, if applicable. Regular preventive services are another way to keep healthy. Preventive services (vaccines, screening tests, monitoring & exams) can help personalize your care plan, which helps you manage your own care. Screening tests can find health problems at the earliest stages, when they are easiest to treat. [facility] follows the current, evidence-based guidelines published by the [facility] ([facility]) when recommending preventive services for our patients. Because we follow these guidelines, sometimes recommendations change over time as research supports it. (For example, mammograms used to be recommended annually. Even though Medicare will still pay for an annual mammogram, the newer guidelines recommend a mammogram every two years for women of average risk.)  Of course, you and your doctor may decide to screen more often for some diseases, based on your risk and your health status. Preventive services for you include:  - Medicare offers their members a free annual wellness visit, which is time for you and your primary care provider to discuss and plan for your preventive service needs. Take advantage of this benefit every year!  -All adults over the age of 72 should receive the recommended pneumonia vaccines. Current USPSTF guidelines recommend a series of two vaccines for the best pneumonia protection.   -All adults should have a flu vaccine yearly and a tetanus vaccine every 10 years. All adults age 61 and older should receive a shingles vaccine once in their lifetime.    -A bone mass density test is recommended when a woman turns 65 to screen for osteoporosis. This test is only recommended one time, as a screening.  Some providers will use this same test as a disease monitoring tool if you already have osteoporosis. -All adults age 38-68 who are overweight should have a diabetes screening test once every three years.   -Other screening tests and preventive services for persons with diabetes include: an eye exam to screen for diabetic retinopathy, a kidney function test, a foot exam, and stricter control over your cholesterol.   -Cardiovascular screening for adults with routine risk involves an electrocardiogram (ECG) at intervals determined by your doctor.   -Colorectal cancer screenings should be done for adults age 54-65 with no increased risk factors for colorectal cancer. There are a number of acceptable methods of screening for this type of cancer. Each test has its own benefits and drawbacks. Discuss with your doctor what is most appropriate for you during your annual wellness visit. The different tests include: colonoscopy (considered the best screening method), a fecal occult blood test, a fecal DNA test, and sigmoidoscopy. -Breast cancer screenings are recommended every other year for women of normal risk, age 54-69.  -Cervical cancer screenings for women over age 72 are only recommended with certain risk factors.   -All adults born between [facility] should be screened once for Hepatitis C.      Here is a list of your current Health Maintenance items (your personalized list of preventive services) with a due date:  Health Maintenance Due   Topic Date Due    Shingles Vaccine (1 of 2) 05/21/1998    Diabetic Foot Care  04/10/2019    Annual Well Visit  04/21/2019

## 2019-05-13 NOTE — PROGRESS NOTES
1. Have you been to the ER, urgent care clinic since your last visit? Hospitalized since your last visit? No    2. Have you seen or consulted any other health care providers outside of the [address] [place] [address] since your last visit? Include any pap smears or colon screening. Charisma Jasmine   Patient has been fitted with hearing aid      This is the Subsequent Medicare Annual Wellness Exam, performed 12 months or more after the Initial AWV or the last Subsequent AWV    I have reviewed the patient's medical history in detail and updated the computerized patient record. Dr. Martinez     Past Medical History:   Diagnosis Date    Allergic rhinitis     Anemia     Asthma     Candida vaginitis     CKD (chronic kidney disease) stage 3, GFR 30-59 ml/min ([facility])     COPD (chronic obstructive pulmonary disease) [facility]     Diabetes ([facility]     Diabetes ([facility]     GERD (gastroesophageal reflux disease)     h pylori gastritis egd 11/14 dr Fay Diaz    Headache     Hx of colonoscopy 11/05/2014    normal rpt 10 years, 11/2024, dr. Fay Diaz    Hypercholesterolemia     Mild dementia     OAB (overactive bladder)     CHRIS on CPAP     Radiculopathy     Urinary incontinence     UTI (urinary tract infection)       Past Surgical History:   Procedure Laterality Date    HX COLONOSCOPY  11/05/2014    Normal/Repeat in 10 years/Dr. Mary Schultz    HX HYSTERECTOMY  1970    HX ORTHOPAEDIC Right 02/2012    knee replacement      Current Outpatient Medications   Medication Sig Dispense Refill    oxybutynin (DITROPAN) 5 mg tablet Take 1 Tab by mouth two (2) times a day. 90 Tab 2    SITagliptin (JANUVIA) 50 mg tablet Take 1 Tab by mouth daily. 90 Tab 2    montelukast (SINGULAIR) 10 mg tablet Take 1 Tab by mouth daily. 90 Tab 2    atorvastatin (LIPITOR) 10 mg tablet TAKE 1 TABLET BY MOUTH ONCE DAILY 90 Tab 3    sertraline (ZOLOFT) 100 mg tablet Take 2 Tabs by mouth daily.  180 Tab 0    pantoprazole (PROTONIX) 40 mg tablet TAKE 1 TABLET BY MOUTH ONCE DAILY 90 Tab 3    buPROPion XL (WELLBUTRIN XL) 150 mg tablet Take 1 Tab by mouth every morning. 30 Tab 0    cholecalciferol (VITAMIN D3) 400 unit tab tablet Take  by mouth daily.  cyclobenzaprine (FLEXERIL) 5 mg tablet Take 5 mg by mouth three (3) times daily as needed for Muscle Spasm(s).  cyanocobalamin (VITAMIN B12) 500 mcg tablet Take 500 mcg by mouth daily.  olopatadine (PAZEO) 0.7 % drop Apply  to eye.  fluticasone (FLOVENT DISKUS) 50 mcg/actuation inhaler Take  by inhalation.  sodium chloride (OCEAN) 0.65 % nasal squeeze bottle 1 Spray as needed for Congestion.  docusate sodium (COLACE) 50 mg capsule Take 50 mg by mouth two (2) times a day.  B2/magnesium cit,oxid/feverfew (MIGRELIEF PO) Take  by mouth.  prednisoLONE acetate (PRED FORTE) 1 % ophthalmic suspension Administer 1 Drop to both eyes four (4) times daily.  aspirin delayed-release 81 mg tablet Take  by mouth daily.  fluticasone-vilanterol (BREO ELLIPTA) 200-25 mcg/dose inhaler Take 1 Puff by inhalation daily.  fluticasone (VERAMYST) 27.5 mcg/actuation nasal spray [place]  in Nose.  cycloSPORINE (RESTASIS) 0.05 % ophthalmic emulsion Instill 1 Drop in affected eye(s) Every 12 hours.  HYDROcodone-acetaminophen (NORCO) 5-325 mg per tablet   0    ipratropium-albuterol (COMBIVENT RESPIMAT)  mcg/actuation inhaler Take 1 inhalation inhaled by mouth Take As Needed. Allergies   Allergen Reactions    Flexeril [Cyclobenzaprine] Other (comments)     Patient states not allergic to this medication.  Iodine Swelling    Nabumetone Swelling    Penicillins Unknown (comments)    Shellfish Containing Products Swelling    Sulfa (Sulfonamide Antibiotics) Swelling    Tramadol Itching     No family history on file.   Social History     Tobacco Use    Smoking status: Former Smoker     Years: 20.00    Smokeless tobacco: Former User     Quit date: 1/1/1999   Substance Use Topics    Alcohol use: Yes     Patient Active Problem List   Diagnosis Code    Pure hypercholesterolemia E78.00    Overactive bladder N32.81    Chronic allergic rhinitis J30.9    Anemia D57.5    Uncomplicated asthma B12.438    Type 2 diabetes mellitus without complication, without long-term current use of insulin (HCC) E11.9    Anemia of chronic disease D63.8    Recurrent depression ([facility] [address] F33.9    Type 2 diabetes with nephropathy ([facility] E11.21       Depression Risk Factor Screening:     3 most recent PHQ Screens 7/27/2018   Little interest or pleasure in doing things More than half the days   Feeling down, depressed, irritable, or hopeless More than half the days   Total Score PHQ 2 4   Trouble falling or staying asleep, or sleeping too much More than half the days   Feeling tired or having little energy More than half the days   Poor appetite, weight loss, or overeating More than half the days   Feeling bad about yourself - or that you are a failure or have let yourself or your family down Several days   Trouble concentrating on things such as school, work, reading, or watching TV More than half the days   Moving or speaking so slowly that other people could have noticed; or the opposite being so fidgety that others notice More than half the days   Thoughts of being better off dead, or hurting yourself in some way Not at all   PHQ 9 Score 15   How difficult have these problems made it for you to do your work, take care of your home and get along with others Somewhat difficult     Alcohol Risk Factor Screening:   Does not drink     Functional Ability and Level of Safety:   Hearing Loss  Hearing is good. The patient wears hearing aids. Activities of Daily Living  The home contains: no safety equipment. Patient does total self care    Fall Risk  Fall Risk Assessment, last 12 mths 4/2/2019   Able to walk? Yes   Fall in past 12 months? Yes   Fall with injury?  No   Number of falls in past 12 months 3   Fall Risk Score 3       Abuse Screen  Patient is not abused    Cognitive Screening   Evaluation of Cognitive Function:  Has your family/caregiver stated any concerns about your memory: yes patient is seeing neurology     Patient Care Team   Patient Care Team:  Carmen Charlton MD as PCP - General (Family Practice)  Sera Cornell MD (Orthopedic Surgery)  Arleth Borges MD (Pulmonary Disease)  Amy Carver MD (Sleep Medicine)  Tariq Hutchinson PsyD (Psychology)  Max Gutierrez MD (Gastroenterology)  Anisha Dowling MD (Neurology)  Agapito Nelson DPM (Podiatry)  Willie Hernandez MD (Ophthalmology)  Nolan Monahan DPM (Podiatry)  Mariela Scott MD (Dermatology)  Estevan Ramon MD (Hematology and Oncology)    Assessment/Plan   Education and counseling provided:  Are appropriate based on today's review and evaluation  End-of-Life planning (with patient's consent)- discussed, provided form, encouraged to complete sooner than later with mild cognitive impairment. Pneumococcal Vaccine- 13 and 23 completed  Screening Mammography- 4/2019  Colorectal cancer screening tests- FIT test neg 3/2019  Cardiovascular screening blood test- 3/2019  Bone mass measurement (DEXA) 2/2018  Screening for glaucoma- 5/2018    Diagnoses and all orders for this visit:    1. Medicare annual wellness visit, subsequent        Health Maintenance Due   Topic Date Due    Shingrix Vaccine Age 49> (1 of 2) 05/21/1998    FOOT EXAM Q1  04/10/2019    MEDICARE YEARLY EXAM  04/21/2019       Zakia Farmer, 69 y.o.,  female    SUBJECTIVE  Ff-up     Depression-reports improved mood with wellbutrin, she continues to take  Zoloft. She reports recent stressors of family relationship strain. Denies harmful ideation. She lives by herself. Issues with memory with mild cognitive impairment. Has seen neurologist and neuropsychiatrist in the past without much concern.   Neuro evaluation for freq falls/gait issues/dizziness, showing chronic ischemic changes on brain MRI, mild bilateral carotid stenosis and EEG wnl. Has upcoming neuro ff-up this month. She has not pursued psychiatric evaluation as recommended on last visit. She was given # today. Chest pain- this has resolved. Reviewed cardiac stress test 3/2019 and cards notes atypical in nature. Franco Vicente NIDDM w/ nephropathy taking [place]. (metformin prev d/mariella with declining gfr) She reports chronic bilateral feet neuropathy is about the same, previous gabapentin without much improvement. She has chronic anemia thought to be due to anemia of chronic disease. She reports GI work up 2014 normal. Reviewed [facility] notes, likely due to renal insufficiency, to consider aranesp after her work up from neuro has been completed. HL- taking lipitor    OAB- taking oxybutynin and responding well to this. GERD- on prilosec. Chronic neck pain with radiculopathy- reports following dr. Andrés Bo for this, currently on flexeril and norco prn. 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 Asthma-doing well she says, on ICS and LAMA for maintenance,  following Dr. Jesusita Fournier    CHRIS on CPAP- reports consistent use,  following  Dr. Maribeth Adam    ROS:  See HPI, all others negative    ROS random episodes of chills, no wt loss/appetite changes, no hot flashes/night sweates/ fever    Patient Active Problem List   Diagnosis Code    Pure hypercholesterolemia E78.00    Overactive bladder N32.81    Chronic allergic rhinitis J30.9    Anemia F16.9    Uncomplicated asthma O52.528    Type 2 diabetes mellitus without complication, without long-term current use of insulin ([facility]) E11.9       Current Outpatient Prescriptions   Medication Sig Dispense Refill    pantoprazole (PROTONIX) 40 mg tablet Take 1 Tab by mouth daily. 90 Tab 0    metFORMIN ER (GLUCOPHAGE XR) 500 mg tablet Take 2 Tabs by mouth daily (with dinner).  180 Tab 0    montelukast (SINGULAIR) 10 mg tablet Take 1 Tab by mouth daily. 90 Tab 2    cyclobenzaprine (FLEXERIL) 5 mg tablet Take 5 mg by mouth three (3) times daily as needed for Muscle Spasm(s).  aspirin delayed-release 81 mg tablet Take  by mouth daily.  cyanocobalamin (VITAMIN B12) 500 mcg tablet Take 500 mcg by mouth daily.  fluticasone-vilanterol (BREO ELLIPTA) 200-25 mcg/dose inhaler Take 1 Puff by inhalation daily.  atorvastatin (LIPITOR) 10 mg tablet Take 1 Tab by mouth daily. 90 Tab 0    oxybutynin (DITROPAN) 5 mg tablet Take 1 Tab by mouth two (2) times a day. 60 Tab 0    SITagliptin (JANUVIA) 50 mg tablet 50 mg.      gabapentin (NEURONTIN) 100 mg capsule 100 mg.      fluticasone (VERAMYST) 27.5 mcg/actuation nasal spray [place]  in Nose.  cycloSPORINE (RESTASIS) 0.05 % ophthalmic emulsion Instill 1 Drop in affected eye(s) Every 12 hours.  HYDROcodone-acetaminophen (NORCO) 5-325 mg per tablet   0    ipratropium-albuterol (COMBIVENT RESPIMAT)  mcg/actuation inhaler Take 1 inhalation inhaled by mouth Take As Needed.  ferrous sulfate 325 mg (65 mg iron) tablet 325 mg.      mometasone-formoterol (DULERA) 100-5 mcg/actuation HFA inhaler Take 2 Puffs by inhalation two (2) times a day.          Allergies   Allergen Reactions    Iodine Swelling    Nabumetone Swelling    Penicillins Unknown (comments)    Shellfish Containing Products Swelling    Sulfa (Sulfonamide Antibiotics) Swelling       Past Medical History:   Diagnosis Date    Allergic rhinitis     Anemia     Asthma     Candida vaginitis     CKD (chronic kidney disease) stage 3, GFR 30-59 ml/min     COPD (chronic obstructive pulmonary disease) (HCC)     Diabetes (HCC)     Diabetes ([facility]     GERD (gastroesophageal reflux disease)     h pylori gastritis egd 11/14 dr Lomeli Reach    Headache     Hx of colonoscopy 11/05/2014    normal rpt 10 years, 11/2024, dr. Lomeli Reach    Hypercholesterolemia     Mild dementia     OAB (overactive bladder)     CHRIS on CPAP     Radiculopathy     Urinary incontinence     UTI (urinary tract infection)        Social History     Social History    Marital status:      Spouse name: N/A    Number of children: N/A    Years of education: N/A     Occupational History    Not on file. Social History Main Topics    Smoking status: Former Smoker     Years: 20.00    Smokeless tobacco: Former User     Quit date: 1/1/1999    Alcohol use Yes    Drug use: No    Sexual activity: Not Currently     Partners: Male     Other Topics Concern    Not on file     Social History Narrative       History reviewed. No pertinent family history. OBJECTIVE    Physical Exam:     Visit Vitals  /64 (BP 1 Location: Left arm, BP Patient Position: Sitting)   Pulse 62   Temp 98.5 °F (36.9 °C) (Oral)   Resp 16   Ht 5' 1\" (1.549 m)   Wt 147 lb (66.7 kg)   SpO2 97%   BMI 27.78 kg/m²       General: alert, AA, in no apparent distress or pain  HEENT: throat and pharynx clear, tm intact, eac patent  Breasts: breasts appear normal, no suspicious masses, no skin or nipple changes or axillary nodes.   CVS: normal rate, regular rhythm, distinct S1 and S2  Lungs:clear to ausculation bilaterally, no crackles, wheezing or rhonchi noted  Abdomen: soft, NT, ND  Extremities: No edema, no cyanosis  Psych:  mood and affect normal    Results for orders placed or performed during the hospital encounter of 05/07/19   DUPLEX CAROTID BILATERAL   Result Value Ref Range    Right cca dist sys 66.0 cm/s    Right CCA dist barriga 13.8 cm/s    RIGHT COMMON CAROTID ARTERY MID S 93.39 cm/s    RIGHT COMMON CAROTID ARTERY MID D 13.83 cm/s    Right CCA prox sys 75.5 cm/s    Right CCA prox barriga 0.0 cm/s    Right eca sys 47.5 cm/s    RIGHT EXTERNAL CAROTID ARTERY D 0.00 cm/s    Right ICA dist sys 67.3 cm/s    Right ICA dist barriga 20.0 cm/s    Right ICA mid sys 72.6 cm/s    Right ICA mid barriga 23.4 cm/s    Right ICA prox sys 58.1 cm/s    Right ICA prox barriga 16.1 cm/s    Right vertebral sys 83.6 cm/s    RIGHT VERTEBRAL ARTERY D 18.90 cm/s    Right ICA/CCA sys 1.1     Left CCA dist sys 70.4 cm/s    Left CCA dist barriga 18.7 cm/s    LEFT COMMON CAROTID ARTERY MID S 99.51 cm/s    LEFT COMMON CAROTID ARTERY MID D 21.97 cm/s    Left CCA prox sys 91.0 cm/s    Left CCA prox barriga 20.4 cm/s    Left ECA sys 61.0 cm/s    LEFT EXTERNAL CAROTID ARTERY D 0.00 cm/s    Left ICA dist sys 70.7 cm/s    Left ICA dist barriga 17.6 cm/s    Left ICA mid sys 82.3 cm/s    Left ICA mid barriga 26.7 cm/s    Left ICA prox sys 90.5 cm/s    Left ICA prox barriga 20.6 cm/s    Left vertebral sys 81.0 cm/s    LEFT VERTEBRAL ARTERY D 21.48 cm/s    Left ICA/CCA sys 1.28     Right subclavian sys 65.3 cm/s    Left subclavian sys 74.9 cm/s         ASSESSMENT/PLAN  Diagnoses and all orders for this visit:    Recurrent  depression  Improving  Cont wellbutrin and zoloft  Cont zoloft to 200 mg  I still recommended to consult psychiatry and pursue counseling to optimize care, provided number on previous referral.   Perhaps contributory to her memory    Gait disturbance  No significant findings on work up  Optimize vascular/metabolic syndrome control  On statin, ASA  Perhaps due to her DM neuropathy  Following neurology    Frequent falls  Fall precautions discussed    Bilateral carotid stenosis  Mild  Optimize med management  Monitoring    Cerebral microvascular disease  Optimize metabolic syndrome mgt     Type 2 diabetes mellitus without complication, without long-term current use of insulin ([facility] [address]  Controlled  cont januvia (off metformin due to worsening gfrl)     Anemia of chronic disease  Likely due to nephropathy  Heme considering aranesp after neuro evaluation as medication carries risk for clots.    Monitoring, check CBC, BMP in 3 months'     Pure hypercholesterolemia  On lipitor     Overactive bladder  Responding to ditropan, to cont    Sickle cell trait    Chronic bronchitis, unspecified chronic bronchitis type ([address]  Controlled  On ICS, prn WILLARD/ipatropium  Following pulm dr. Zeeshan Preston in 3 months or sooner prn  I spent greater than 40 minutes with pt with greater than 50% of the face to face time counseling and coordinating care       Patient understands plan of care. Patient has provided input and agrees with goals.

## 2019-05-21 ENCOUNTER — OFFICE VISIT (OUTPATIENT)
Dept: NEUROLOGY | Age: 69
End: 2019-05-21

## 2019-05-21 VITALS
HEIGHT: 61 IN | OXYGEN SATURATION: 97 % | WEIGHT: 151 LBS | RESPIRATION RATE: 18 BRPM | DIASTOLIC BLOOD PRESSURE: 60 MMHG | TEMPERATURE: 97.2 F | BODY MASS INDEX: 28.51 KG/M2 | HEART RATE: 65 BPM | SYSTOLIC BLOOD PRESSURE: 104 MMHG

## 2019-05-21 DIAGNOSIS — R42 LIGHTHEADEDNESS: ICD-10-CM

## 2019-05-21 DIAGNOSIS — R42 DIZZY: Primary | ICD-10-CM

## 2019-05-21 NOTE — PROGRESS NOTES
[employer]  [address] [facility], [address], [place], [address]  [address] [facility]. [address], [address]  Office:  [phone]  Fax: [phone]  Chief Complaint   Patient presents with   [address] [facility] Fall    Dizziness     follow up MRI, doppers, EEG     This is a 68year old female who presents for follow dizziness. Endorses intermittent dizziness. She has fallen once. She says she was recently seen by hematology and they are recommending a new medication called Aranesp. She said she is awaiting approval by her primary care provider. She will call on the status and recommendation of this. She believed her symptoms are related to her anemia. Denies seeing the room spinning, waking up on the floor unsure how she got there, loss of consciousness, or focal deficits. Denies chest pain or shortness of breath. Here to discuss results. Endorses upcoming appointment with cardiology and psychiatry. She was referred for her depression. Denies SI/HI. Past Medical History:   Diagnosis Date    Allergic rhinitis     Anemia     Asthma     Candida vaginitis     CKD (chronic kidney disease) stage 3, GFR 30-59 ml/min [facility]     COPD (chronic obstructive pulmonary disease) [facility]     Diabetes ([facility] [address]     Diabetes ([facility]     GERD (gastroesophageal reflux disease)     h pylori gastritis egd 11/14 dr Dionisio Florence    Headache     Hx of colonoscopy 11/05/2014    normal rpt 10 years, 11/2024, dr. Dionisio Florence    Hypercholesterolemia     Mild dementia     OAB (overactive bladder)     CHRIS on CPAP     Radiculopathy     Urinary incontinence     UTI (urinary tract infection)        Past Surgical History:   Procedure Laterality Date    HX COLONOSCOPY  11/05/2014    Normal/Repeat in 10 years/Dr. Willow Rice    HX HYSTERECTOMY  1970    HX ORTHOPAEDIC Right 02/2012    knee replacement        Current Outpatient Medications   Medication Sig Dispense Refill    olopatadine (PAZEO) 0.7 % drop Apply  to eye.       oxybutynin (DITROPAN) 5 mg tablet Take 1 Tab by mouth two (2) times a day. 90 Tab 2    SITagliptin (JANUVIA) 50 mg tablet Take 1 Tab by mouth daily. 90 Tab 2    montelukast (SINGULAIR) 10 mg tablet Take 1 Tab by mouth daily. 90 Tab 2    atorvastatin (LIPITOR) 10 mg tablet TAKE 1 TABLET BY MOUTH ONCE DAILY 90 Tab 3    sertraline (ZOLOFT) 100 mg tablet Take 2 Tabs by mouth daily. 180 Tab 0    pantoprazole (PROTONIX) 40 mg tablet TAKE 1 TABLET BY MOUTH ONCE DAILY 90 Tab 3    buPROPion XL (WELLBUTRIN XL) 150 mg tablet Take 1 Tab by mouth every morning. 30 Tab 0    fluticasone (FLOVENT DISKUS) 50 mcg/actuation inhaler Take  by inhalation.  sodium chloride (OCEAN) 0.65 % nasal squeeze bottle 1 Spray as needed for Congestion.  docusate sodium (COLACE) 50 mg capsule Take 50 mg by mouth two (2) times a day.  cholecalciferol (VITAMIN D3) 400 unit tab tablet Take  by mouth daily.  B2/magnesium cit,oxid/feverfew (MIGRELIEF PO) Take  by mouth.  cyclobenzaprine (FLEXERIL) 5 mg tablet Take 5 mg by mouth three (3) times daily as needed for Muscle Spasm(s).  aspirin delayed-release 81 mg tablet Take  by mouth daily.  cyanocobalamin (VITAMIN B12) 500 mcg tablet Take 500 mcg by mouth daily.  fluticasone-vilanterol (BREO ELLIPTA) 200-25 mcg/dose inhaler Take 1 Puff by inhalation daily.  fluticasone (VERAMYST) 27.5 mcg/actuation nasal spray [employer]  in Nose.  HYDROcodone-acetaminophen (NORCO) 5-325 mg per tablet   0    ipratropium-albuterol (COMBIVENT RESPIMAT)  mcg/actuation inhaler Take 1 inhalation inhaled by mouth Take As Needed.  prednisoLONE acetate (PRED FORTE) 1 % ophthalmic suspension Administer 1 Drop to both eyes four (4) times daily.  cycloSPORINE (RESTASIS) 0.05 % ophthalmic emulsion Instill 1 Drop in affected eye(s) Every 12 hours.           Allergies   Allergen Reactions    Flexeril [Cyclobenzaprine] Other (comments)     Patient states not allergic to this medication.  Iodine Swelling    Nabumetone Swelling    Penicillins Unknown (comments)    Shellfish Containing Products Swelling    Sulfa (Sulfonamide Antibiotics) Swelling    Tramadol Itching       Social History     Tobacco Use    Smoking status: Former Smoker     Years: 20.00    Smokeless tobacco: Former User     Quit date: 1/1/1999   Substance Use Topics    Alcohol use: Yes    Drug use: No       History reviewed. No pertinent family history. Review of Systems  GENERAL: Denies fever or fatigue  CARDIAC: No CP or SOB  PULMONARY: No cough of SOB  MUSCULOSKELETAL: No new joint pain  NEURO: SEE HPI    Examination  Visit Vitals  /60 (BP 1 Location: Left arm, BP Patient Position: Sitting)   Pulse 65   Temp 97.2 °F (36.2 °C)   Resp 18   Ht 5' 1\" (1.549 m)   Wt 68.5 kg (151 lb)   SpO2 97%   BMI 28.53 kg/m²       Pleasant 78-year-old female. She is somewhat of a poor historian and has difficulty discussing her medical history. No abnormal movements. No signs of ataxia or incoordination. Steady gait. Impression/Plan  Tram Peña is a 68 y.o. female whose history and physical are consistent with dizziness. Patient endorses intermittent dizziness without focal deficits. Here to discuss results. We discussed unremarkable findings on MRI of the brain. Carotid Doppler without any significant stenosis. EEG unremarkable for any epileptiform. At this juncture no additional work-up recommended at this time. Maintain recommended follow-ups with hematology, primary care provider, and cardiologist.  She endorses being anemic and awaiting clearance by her primary care provider to start medication management for this. Follow-up on an as-needed basis. All questions addressed and patient is agreeable with plan of care. Diagnoses and all orders for this visit:    1. Dizzy    2.  Lightheadedness    Total time: 30 min   Counseling / coordination time: 25 min   > 50% counseling / coordination?: Yes re: symptoms, management, diagnostic test results, answering questions, and plan of care. Signed By: Alize Garcia NP    This note will not be viewable in [address] PLEASE NOTE:   Portions of this document may have been produced using voice recognition software. Unrecognized errors in transcription may be present.

## 2019-05-21 NOTE — PATIENT INSTRUCTIONS
Preventing Falls: After Your Visit   Your Care Instructions   Getting around your home safely can be a challenge if you have injuries or health problems that make it easy for you to fall. Loose rugs and furniture in walkways are among the dangers for many older people who have problems walking or who have poor eyesight. People who have conditions such as arthritis, osteoporosis, or dementia also have to be careful not to fall. You can make your home safer with a few simple measures. Follow-up care is a key part of your treatment and safety. Be sure to make and go to all appointments, and call your doctor if you are having problems. It's also a good idea to know your test results and keep a list of the medicines you take. How can you care for yourself at home? Taking care of yourself   You may get dizzy if you do not drink enough water. To prevent dehydration, drink plenty of fluids, enough so that your urine is light yellow or clear like water. Choose water and other caffeine-free clear liquids. If you have kidney, heart, or liver disease and have to limit fluids, talk with your doctor before you increase the amount of fluids you drink. Exercise regularly to improve your strength, muscle tone, and balance. Walk if you can. Swimming may be a good choice if you cannot walk easily. Have your vision and hearing checked each year or any time you notice a change. If you have trouble seeing and hearing, you might not be able to avoid objects and could lose your balance. Know the side effects of the medicines you take. Ask your doctor or pharmacist whether the medicines you take can affect your balance. Sleeping pills or sedatives can affect your balance. Limit the amount of alcohol you drink. Alcohol can impair your balance and other senses. Ask your doctor whether calluses or corns on your feet need to be removed.  If you wear loose-fitting shoes because of calluses or corns, you can lose your balance and fall.   Talk to your doctor if you have numbness in your feet. Preventing falls at home   Remove raised doorway thresholds, throw rugs, and clutter. Repair loose carpet or raised areas in the floor. Move furniture and electrical cords to keep them out of walking paths. Use nonskid floor wax, and wipe up spills right away, especially on ceramic tile floors. If you use a walker or cane, put rubber tips on it. If you use crutches, clean the bottoms of them regularly with an abrasive pad, such as steel wool. Keep your house well lit, especially stairways, porches, and outside walkways. Use night-lights in areas such as hallways and bathrooms. Add extra light switches or use remote switches (such as switches that go on or off when you clap your hands) to make it easier to turn lights on if you have to get up during the night. Install sturdy handrails on stairways. Move items in your cabinets so that the things you use a lot are on the lower shelves (about waist level). Keep a cordless phone and a flashlight with new batteries by your bed. If possible, put a phone in each of the main rooms of your house, or carry a cell phone in case you fall and cannot reach a phone. Or, you can wear a device around your neck or wrist. You push a button that sends a signal for help. Wear low-heeled shoes that fit well and give your feet good support. Use footwear with nonskid soles. Check the heels and soles of your shoes for wear. Repair or replace worn heels or soles. Do not wear socks without shoes on wood floors. Walk on the grass when the sidewalks are slippery. If you live in an area that gets snow and ice in the winter, sprinkle salt on slippery steps and sidewalks. Preventing falls in the bath   Install grab bars and nonskid mats inside and outside your shower or tub and near the toilet and sinks. Use shower chairs and bath benches. Use a hand-held shower head that will allow you to sit while showering. Get into a tub or shower by putting the weaker leg in first. Get out of a tub or shower with your strong side first.   Repair loose toilet seats and consider installing a raised toilet seat to make getting on and off the toilet easier. Keep your bathroom door unlocked while you are in the shower. Where can you learn more? Go to [employer]   Enter G117 in the search box to learn more about \"Preventing Falls: After Your Visit. \"    © 0058-9769 [employer]wise, [employer]. Care instructions adapted under license by [employer] (which disclaims liability or warranty for this information). This care instruction is for use with your licensed healthcare professional. If you have questions about a medical condition or this instruction, always ask your healthcare professional. [address] any warranty or liability for your use of this information. Content Version: 10.1.656861; Current as of: May 15, 2013              Thank you for choosing [employer] and [facility] for your     care. You may receive a survey about your visit. We appreciate you taking time     to complete this survey as we use your feedback to improve our services. We     realize we are not perfect, but we strive to provide excellent care.

## 2019-06-12 DIAGNOSIS — F33.9 RECURRENT DEPRESSION (HCC): ICD-10-CM

## 2019-06-12 NOTE — TELEPHONE ENCOUNTER
This patient contacted office for the following prescriptions to be filled:    Medication requested :   Requested Prescriptions     Pending Prescriptions Disp Refills    buPROPion XL (WELLBUTRIN XL) 150 mg tablet 30 Tab 0     Sig: Take 1 Tab by mouth every morning.      PCP: [address] or Print: [facility]  Mail order or Local pharmacy [phone]    Scheduled appointment if not seen by current providers in office: [id number]

## 2019-06-13 ENCOUNTER — TELEPHONE (OUTPATIENT)
Dept: FAMILY MEDICINE CLINIC | Age: 69
End: 2019-06-13

## 2019-06-13 RX ORDER — BUPROPION HYDROCHLORIDE 150 MG/1
150 TABLET ORAL
Qty: 90 TAB | Refills: 0 | Status: SHIPPED | OUTPATIENT
Start: 2019-06-13 | End: 2019-10-21 | Stop reason: SDUPTHER

## 2019-06-13 NOTE — TELEPHONE ENCOUNTER
Pt states that she had a bowel movement this morning and when she wiped she has some mucus with a little blood in it. She states that she has fallen about 4 times since the last time that she was in to Dr Leonardo Farrell and would like to know if she needs to be concerned. Please advise.

## 2019-06-26 ENCOUNTER — TELEPHONE (OUTPATIENT)
Dept: FAMILY MEDICINE CLINIC | Age: 69
End: 2019-06-26

## 2019-06-26 NOTE — TELEPHONE ENCOUNTER
Pt states that she needs to speak with Dr Matti Vazquez about an upcoming surgery for her drop foot. She is seeing a neurologist on the 7/15/2019 for the podiatrist to know where exactly she needs the surgery. Please advise.

## 2019-07-08 ENCOUNTER — HOSPITAL ENCOUNTER (OUTPATIENT)
Dept: LAB | Age: 69
Discharge: HOME | End: 2019-07-08
Payer: MEDICARE

## 2019-07-08 DIAGNOSIS — D63.8 ANEMIA OF CHRONIC DISEASE: ICD-10-CM

## 2019-07-08 LAB
ANION GAP SERPL CALC-SCNC: 3 MMOL/L (ref 3–18)
BASOPHILS # BLD: 0 K/UL (ref 0–0.1)
BASOPHILS NFR BLD: 0 % (ref 0–2)
BUN SERPL-MCNC: 12 MG/DL (ref 7–18)
BUN/CREAT SERPL: 7 (ref 12–20)
CALCIUM SERPL-MCNC: 8.9 MG/DL (ref 8.5–10.1)
CHLORIDE SERPL-SCNC: 111 MMOL/L (ref 100–108)
CO2 SERPL-SCNC: 30 MMOL/L (ref 21–32)
CREAT SERPL-MCNC: 1.83 MG/DL (ref 0.6–1.3)
DIFFERENTIAL METHOD BLD: ABNORMAL
EOSINOPHIL # BLD: 0.1 K/UL (ref 0–0.4)
EOSINOPHIL NFR BLD: 3 % (ref 0–5)
ERYTHROCYTE [DISTWIDTH] IN BLOOD BY AUTOMATED COUNT: 16.6 % (ref 11.6–14.5)
GLUCOSE SERPL-MCNC: 138 MG/DL (ref 74–99)
HCT VFR BLD AUTO: 31 % (ref 35–45)
HGB BLD-MCNC: 10 G/DL (ref 12–16)
LYMPHOCYTES # BLD: 0.9 K/UL (ref 0.9–3.6)
LYMPHOCYTES NFR BLD: 22 % (ref 21–52)
MCH RBC QN AUTO: 25.8 PG (ref 24–34)
MCHC RBC AUTO-ENTMCNC: 32.3 G/DL (ref 31–37)
MCV RBC AUTO: 80.1 FL (ref 74–97)
MONOCYTES # BLD: 0.4 K/UL (ref 0.05–1.2)
MONOCYTES NFR BLD: 10 % (ref 3–10)
NEUTS SEG # BLD: 2.5 K/UL (ref 1.8–8)
NEUTS SEG NFR BLD: 65 % (ref 40–73)
PLATELET # BLD AUTO: 153 K/UL (ref 135–420)
PMV BLD AUTO: 9 FL (ref 9.2–11.8)
POTASSIUM SERPL-SCNC: 4.3 MMOL/L (ref 3.5–5.5)
RBC # BLD AUTO: 3.87 M/UL (ref 4.2–5.3)
SODIUM SERPL-SCNC: 144 MMOL/L (ref 136–145)
WBC # BLD AUTO: 3.9 K/UL (ref 4.6–13.2)

## 2019-07-08 PROCEDURE — 85025 COMPLETE CBC W/AUTO DIFF WBC: CPT

## 2019-07-08 PROCEDURE — 80048 BASIC METABOLIC PNL TOTAL CA: CPT

## 2019-07-08 PROCEDURE — 36415 COLL VENOUS BLD VENIPUNCTURE: CPT

## 2019-07-08 NOTE — TELEPHONE ENCOUNTER
Patient identified with 2 identifiers (name and ). Patient has decided not to have surgery. She will discuss this with DR. Fortunato Chirinos at her appt 2019

## 2019-07-11 RX ORDER — SERTRALINE HYDROCHLORIDE 100 MG/1
TABLET, FILM COATED ORAL
Qty: 180 TAB | Refills: 0 | Status: SHIPPED | OUTPATIENT
Start: 2019-07-11 | End: 2019-09-12 | Stop reason: ALTCHOICE

## 2019-07-12 NOTE — PROGRESS NOTES
Patient identifier verified. Patient advised that her PCP will review labs at her follow-up. Similar abnormalities to before. Patient voices understanding.

## 2019-08-15 ENCOUNTER — OFFICE VISIT (OUTPATIENT)
Dept: FAMILY MEDICINE CLINIC | Age: 69
End: 2019-08-15

## 2019-08-15 ENCOUNTER — HOSPITAL ENCOUNTER (OUTPATIENT)
Dept: GENERAL RADIOLOGY | Age: 69
Discharge: HOME | End: 2019-08-15
Payer: MEDICARE

## 2019-08-15 VITALS
WEIGHT: 149 LBS | BODY MASS INDEX: 28.13 KG/M2 | DIASTOLIC BLOOD PRESSURE: 66 MMHG | RESPIRATION RATE: 16 BRPM | HEART RATE: 63 BPM | OXYGEN SATURATION: 97 % | HEIGHT: 61 IN | TEMPERATURE: 97.9 F | SYSTOLIC BLOOD PRESSURE: 124 MMHG

## 2019-08-15 DIAGNOSIS — R42 VERTIGO: ICD-10-CM

## 2019-08-15 DIAGNOSIS — R26.9 GAIT DISTURBANCE: Primary | ICD-10-CM

## 2019-08-15 DIAGNOSIS — I67.89 CEREBRAL MICROVASCULAR DISEASE: ICD-10-CM

## 2019-08-15 DIAGNOSIS — D63.8 ANEMIA OF CHRONIC DISEASE: ICD-10-CM

## 2019-08-15 DIAGNOSIS — N32.81 OVERACTIVE BLADDER: ICD-10-CM

## 2019-08-15 DIAGNOSIS — M54.50 LUMBAR SPINE PAIN: ICD-10-CM

## 2019-08-15 DIAGNOSIS — M54.9 MID BACK PAIN: ICD-10-CM

## 2019-08-15 DIAGNOSIS — J42 CHRONIC BRONCHITIS, UNSPECIFIED CHRONIC BRONCHITIS TYPE (HCC): ICD-10-CM

## 2019-08-15 DIAGNOSIS — M54.2 NECK PAIN: ICD-10-CM

## 2019-08-15 DIAGNOSIS — N18.30 CKD (CHRONIC KIDNEY DISEASE) STAGE 3, GFR 30-59 ML/MIN (HCC): ICD-10-CM

## 2019-08-15 DIAGNOSIS — R29.6 FREQUENT FALLS: ICD-10-CM

## 2019-08-15 DIAGNOSIS — F33.9 RECURRENT DEPRESSION (HCC): ICD-10-CM

## 2019-08-15 DIAGNOSIS — E78.00 PURE HYPERCHOLESTEROLEMIA: ICD-10-CM

## 2019-08-15 DIAGNOSIS — I65.23 BILATERAL CAROTID ARTERY STENOSIS: ICD-10-CM

## 2019-08-15 DIAGNOSIS — R42 DIZZINESS: ICD-10-CM

## 2019-08-15 DIAGNOSIS — E11.9 TYPE 2 DIABETES MELLITUS WITHOUT COMPLICATION, WITHOUT LONG-TERM CURRENT USE OF INSULIN (HCC): ICD-10-CM

## 2019-08-15 LAB — HBA1C MFR BLD HPLC: 6.3 %

## 2019-08-15 PROCEDURE — 72040 X-RAY EXAM NECK SPINE 2-3 VW: CPT

## 2019-08-15 PROCEDURE — 72070 X-RAY EXAM THORAC SPINE 2VWS: CPT

## 2019-08-15 PROCEDURE — 72100 X-RAY EXAM L-S SPINE 2/3 VWS: CPT

## 2019-08-15 PROCEDURE — 72072 X-RAY EXAM THORAC SPINE 3VWS: CPT

## 2019-08-15 RX ORDER — GLUCOSAMINE/CHONDR SU A SOD 750-600 MG
TABLET ORAL
COMMUNITY
End: 2019-08-19

## 2019-08-15 RX ORDER — ACETAMINOPHEN, DIPHENHYDRAMINE HCL 500; 25 MG/1; MG/1
TABLET, COATED ORAL
COMMUNITY
End: 2019-09-12

## 2019-08-15 RX ORDER — BISMUTH SUBSALICYLATE 262 MG
1 TABLET,CHEWABLE ORAL DAILY
COMMUNITY
End: 2019-09-12

## 2019-08-15 NOTE — PROGRESS NOTES
Susie Edwards, 69 y.o.,  female    SUBJECTIVE  Ff-up      Frequent falls- says she has fallen multiple times since last visit. Says at times she gets up feeling dizzy, at times she feels unsteady with walking, at times she does not even remember if she has tripped, but finds herself on the ground. Most recent fall a last week she fell backwards and reports worsening back pain of the entire back. This has been ongoing for years now. She does have chronic neuropathy, R foot drop and uses her walker consistently. She says she has a shower chair and rails to keep her safe. She has a alert necklace. She lives by herself and brother who used to check on her is now sick. she had recent neuro evaluation for these complaints. freq falls/gait issues/dizziness and essentially no concerning findings with no further recommendations. MRI: showing chronic ischemic changes on brain MRI, mild bilateral carotid stenosis and EEG wnl. She has been evaluated by cardiology and has had normal cardiac stress test 3/19. She says she had home health nurse/aid a few years ago, however she decided to discontinue this as she was feeling uneasy with strangers around, and frankly she did not find helpful for her at all. She is not the best historian either, as she jumps from topic to topic. Depression-says following [facility], reviewed note to continue current medication and concern for home safety with her frequent falls    NIDDM w/ nephropathy taking [place] [facility] [place]. (metformin prev d/mariella with declining gfr) She reports chronic bilateral feet neuropathy is about the same, refractory to neurontin. Reviewed labs anemia is stable, Creatinine is worsening     She has chronic anemia thought to be due to anemia of chronic disease. She reports GI work up 2014 normal.VOA notes state likely due to renal insufficiency, to consider aranesp, she has not followed through with this.     HL- taking lipitor    OAB- taking oxybutynin and responding well to this.     GERD- on prilosec. Chronic neck pain with radiculopathy- reports following dr. Leonel Wan for this, currently on flexeril and norco prn. 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 Asthma-doing well she says, on ICS and LAMA for maintenance,  following Dr. Willian Hdz    CHRIS on CPAP- reports consistent use,  following  Dr. Sanaz Gonzalez    ROS:  See HPI, all others negative    ROS random episodes of chills, no wt loss/appetite changes, no hot flashes/night sweates/ fever    Patient Active Problem List   Diagnosis Code    Pure hypercholesterolemia E78.00    Overactive bladder N32.81    Chronic allergic rhinitis J30.9    Anemia B64.1    Uncomplicated asthma S34.526    Type 2 diabetes mellitus without complication, without long-term current use of insulin (HCC) E11.9       Current Outpatient Prescriptions   Medication Sig Dispense Refill    pantoprazole (PROTONIX) 40 mg tablet Take 1 Tab by mouth daily. 90 Tab 0    metFORMIN ER (GLUCOPHAGE XR) 500 mg tablet Take 2 Tabs by mouth daily (with dinner). 180 Tab 0    montelukast (SINGULAIR) 10 mg tablet Take 1 Tab by mouth daily. 90 Tab 2    cyclobenzaprine (FLEXERIL) 5 mg tablet Take 5 mg by mouth three (3) times daily as needed for Muscle Spasm(s).  aspirin delayed-release 81 mg tablet Take  by mouth daily.  cyanocobalamin (VITAMIN B12) 500 mcg tablet Take 500 mcg by mouth daily.  fluticasone-vilanterol (BREO ELLIPTA) 200-25 mcg/dose inhaler Take 1 Puff by inhalation daily.  atorvastatin (LIPITOR) 10 mg tablet Take 1 Tab by mouth daily. 90 Tab 0    oxybutynin (DITROPAN) 5 mg tablet Take 1 Tab by mouth two (2) times a day. 60 Tab 0    SITagliptin (JANUVIA) 50 mg tablet 50 mg.      gabapentin (NEURONTIN) 100 mg capsule 100 mg.      fluticasone (VERAMYST) 27.5 mcg/actuation nasal spray [place]  in Nose.  cycloSPORINE (RESTASIS) 0.05 % ophthalmic emulsion Instill 1 Drop in affected eye(s) Every 12 hours.       HYDROcodone-acetaminophen (NORCO) 5-325 mg per tablet   0    ipratropium-albuterol (COMBIVENT RESPIMAT)  mcg/actuation inhaler Take 1 inhalation inhaled by mouth Take As Needed.  ferrous sulfate 325 mg (65 mg iron) tablet 325 mg.      mometasone-formoterol (DULERA) 100-5 mcg/actuation HFA inhaler Take 2 Puffs by inhalation two (2) times a day. Allergies   Allergen Reactions    Iodine Swelling    Nabumetone Swelling    Penicillins Unknown (comments)    Shellfish Containing Products Swelling    Sulfa (Sulfonamide Antibiotics) Swelling       Past Medical History:   Diagnosis Date    Allergic rhinitis     Anemia     Asthma     Candida vaginitis     CKD (chronic kidney disease) stage 3, GFR 30-59 ml/min     COPD (chronic obstructive pulmonary disease) (HCC)     Diabetes (HCC)     Diabetes (HCC)     GERD (gastroesophageal reflux disease)     h pylori gastritis egd 11/14 dr Fish Goins    Headache     Hx of colonoscopy 11/05/2014    normal rpt 10 years, 11/2024, dr. Fish Goins    Hypercholesterolemia     Mild dementia     OAB (overactive bladder)     CHRIS on CPAP     Radiculopathy     Urinary incontinence     UTI (urinary tract infection)        Social History     Social History    Marital status:      Spouse name: N/A    Number of children: N/A    Years of education: N/A     Occupational History    Not on file. Social History Main Topics    Smoking status: Former Smoker     Years: 20.00    Smokeless tobacco: Former User     Quit date: 1/1/1999    Alcohol use Yes    Drug use: No    Sexual activity: Not Currently     Partners: Male     Other Topics Concern    Not on file     Social History Narrative       History reviewed. No pertinent family history.       OBJECTIVE    Physical Exam:     Visit Vitals  /66 (BP 1 Location: Left arm, BP Patient Position: Sitting)   Pulse 63   Temp 97.9 °F (36.6 °C) (Oral)   Resp 16   Ht 5' 1\" (1.549 m)   Wt 149 lb (67.6 kg)   SpO2 97%   BMI 28.15 kg/m²       General: alert, AA, in no apparent distress or pain  HEENT: throat and pharynx clear, tm intact, eac patent  Neck: +MTTP on entire pack, posterior neck extending to lumbar spine, no gross deformity, UE/LE DTRs, motor intact   CVS: normal rate, regular rhythm, distinct S1 and S2  Lungs:clear to ausculation bilaterally, no crackles, wheezing or rhonchi noted  Abdomen: soft, NT, ND  Extremities: No edema, no cyanosis  Psych:  mood and affect normal    Results for orders placed or performed in visit on 08/15/19   AMB POC HEMOGLOBIN A1C   Result Value Ref Range    Hemoglobin A1c (POC) 6.3 %         ASSESSMENT/PLAN  Diagnoses and all orders for this visit:    Frequent falls  Likely multifactorial: peripheral/DM neuropathy, foot drop, she may have some vascular dementia setting in as well. Her neuro/cards eval was essentially neg. Mild carotid stenosis, chronic cerebral ischemic changes  Concern for safety as she lives by herself  Advised consistent walker use, fall precautions discussed  Will have ENT evaluate dizziness/vertigo, will get second opinion on cervical stenosis if contributing and how to optimize care, will refer to dr. Boudreaux Shoulder  Referral to home health for safety evaluation  Advised to bring family member on next visit, seriously consider living with family, she is very hesitant to do this.      Gait disturbance  No significant findings on neuro work up  Optimize vascular/metabolic syndrome control  On statin, ASA  Refractory to gabapentin    Recurrent  depression  Fair control  Cont wellbutrin and zoloft  Following [facility] psychiatry    Bilateral carotid stenosis  Mild  Optimize med management  Monitoring    Cerebral microvascular disease  Optimize metabolic syndrome mgt     Type 2 diabetes mellitus without complication, without long-term current use of insulin (HCC)  Controlled  a1c 6.3  cont januvia (off metformin due to worsening gfrl)     Anemia of chronic disease  Likely due to nephropathy  Heme considering aranesp after neuro evaluation as medication carries risk for clots. Advised to follow through with ff-ups      CKD 3  Worsening  Referral to nephrology    Neck pain  S/p fall  Acute on chronic  Xray neck    Mid back pain  Xray thoracic spine    Low back pain  Xray lumbar spine    Dizziness  Referral to ENT     Pure hypercholesterolemia  On lipitor     Overactive bladder  Responding to ditropan, to cont      Chronic bronchitis, unspecified chronic bronchitis type ([address]  Controlled  On ICS, prn WILLARD/ipatropium  Following pulm dr. Brandon Uriostegui in 1 months or sooner prn  I spent greater than 40 minutes with pt with greater than 50% of the face to face time counseling and coordinating care       Patient understands plan of care. Patient has provided input and agrees with goals.

## 2019-08-15 NOTE — PROGRESS NOTES
1. Have you been to the ER, urgent care clinic since your last visit? Hospitalized since your last visit? No    2. Have you seen or consulted any other health care providers outside of the [address] [place] [address] since your last visit? Include any pap smears or colon screening.  No

## 2019-08-16 NOTE — PROGRESS NOTES
No fracture on spine xray. There is arthritis in multiple levels, most pronounced on neck spine, which is chronic and I know she has been following dr. Cornell Dyer for this  On  Her last visit we discussed perhaps seeing a different spine specialist to evaluate her spine, if possibly contributing to her falls. The referral was placed on last visit. pls notify pt.

## 2019-08-19 ENCOUNTER — OFFICE VISIT (OUTPATIENT)
Dept: FAMILY MEDICINE CLINIC | Age: 69
End: 2019-08-19

## 2019-08-19 ENCOUNTER — HOSPITAL ENCOUNTER (OUTPATIENT)
Dept: LAB | Age: 69
Discharge: HOME | End: 2019-08-19
Payer: MEDICARE

## 2019-08-19 VITALS
OXYGEN SATURATION: 98 % | BODY MASS INDEX: 27.38 KG/M2 | HEIGHT: 61 IN | TEMPERATURE: 98.3 F | HEART RATE: 60 BPM | SYSTOLIC BLOOD PRESSURE: 138 MMHG | WEIGHT: 145 LBS | DIASTOLIC BLOOD PRESSURE: 64 MMHG | RESPIRATION RATE: 16 BRPM

## 2019-08-19 DIAGNOSIS — F31.9 BIPOLAR AFFECTIVE DISORDER, REMISSION STATUS UNSPECIFIED (HCC): ICD-10-CM

## 2019-08-19 DIAGNOSIS — R11.2 NON-INTRACTABLE VOMITING WITH NAUSEA, UNSPECIFIED VOMITING TYPE: ICD-10-CM

## 2019-08-19 DIAGNOSIS — R26.9 GAIT DISTURBANCE: ICD-10-CM

## 2019-08-19 DIAGNOSIS — N32.81 OVERACTIVE BLADDER: ICD-10-CM

## 2019-08-19 DIAGNOSIS — N18.30 CKD (CHRONIC KIDNEY DISEASE) STAGE 3, GFR 30-59 ML/MIN (HCC): ICD-10-CM

## 2019-08-19 DIAGNOSIS — I67.89 CEREBRAL MICROVASCULAR DISEASE: ICD-10-CM

## 2019-08-19 DIAGNOSIS — J42 CHRONIC BRONCHITIS, UNSPECIFIED CHRONIC BRONCHITIS TYPE (HCC): ICD-10-CM

## 2019-08-19 DIAGNOSIS — F20.9 SCHIZOPHRENIA, UNSPECIFIED TYPE (HCC): ICD-10-CM

## 2019-08-19 DIAGNOSIS — R29.6 FREQUENT FALLS: ICD-10-CM

## 2019-08-19 DIAGNOSIS — R11.2 NON-INTRACTABLE VOMITING WITH NAUSEA, UNSPECIFIED VOMITING TYPE: Primary | ICD-10-CM

## 2019-08-19 DIAGNOSIS — I65.23 BILATERAL CAROTID ARTERY STENOSIS: ICD-10-CM

## 2019-08-19 DIAGNOSIS — E11.9 TYPE 2 DIABETES MELLITUS WITHOUT COMPLICATION, WITHOUT LONG-TERM CURRENT USE OF INSULIN (HCC): ICD-10-CM

## 2019-08-19 DIAGNOSIS — D63.8 ANEMIA OF CHRONIC DISEASE: ICD-10-CM

## 2019-08-19 DIAGNOSIS — E78.00 PURE HYPERCHOLESTEROLEMIA: ICD-10-CM

## 2019-08-19 LAB
ALBUMIN SERPL-MCNC: 3.2 G/DL (ref 3.4–5)
ALBUMIN/GLOB SERPL: 1 {RATIO} (ref 0.8–1.7)
ALP SERPL-CCNC: 87 U/L (ref 45–117)
ALT SERPL-CCNC: 18 U/L (ref 13–56)
ANION GAP SERPL CALC-SCNC: 5 MMOL/L (ref 3–18)
AST SERPL-CCNC: 17 U/L (ref 10–38)
BILIRUB SERPL-MCNC: 0.2 MG/DL (ref 0.2–1)
BUN SERPL-MCNC: 13 MG/DL (ref 7–18)
BUN/CREAT SERPL: 8 (ref 12–20)
CALCIUM SERPL-MCNC: 8.1 MG/DL (ref 8.5–10.1)
CHLORIDE SERPL-SCNC: 111 MMOL/L (ref 100–111)
CO2 SERPL-SCNC: 29 MMOL/L (ref 21–32)
CREAT SERPL-MCNC: 1.56 MG/DL (ref 0.6–1.3)
GLOBULIN SER CALC-MCNC: 3.1 G/DL (ref 2–4)
GLUCOSE SERPL-MCNC: 104 MG/DL (ref 74–99)
LIPASE SERPL-CCNC: 181 U/L (ref 73–393)
POTASSIUM SERPL-SCNC: 4.2 MMOL/L (ref 3.5–5.5)
PROT SERPL-MCNC: 6.3 G/DL (ref 6.4–8.2)
SODIUM SERPL-SCNC: 145 MMOL/L (ref 136–145)

## 2019-08-19 PROCEDURE — 83690 ASSAY OF LIPASE: CPT

## 2019-08-19 PROCEDURE — 80053 COMPREHEN METABOLIC PANEL: CPT

## 2019-08-19 PROCEDURE — 36415 COLL VENOUS BLD VENIPUNCTURE: CPT

## 2019-08-19 RX ORDER — PROMETHAZINE HYDROCHLORIDE 12.5 MG/1
TABLET ORAL
Refills: 0 | COMMUNITY
Start: 2019-08-18 | End: 2019-09-12

## 2019-08-19 NOTE — PROGRESS NOTES
Steve Montero, 69 y.o.,  female    SUBJECTIVE  Ff-up ED    Pt reports acute onset vomiting, about 4 times, about 30 mins after eating dinner sausage and mac and cheese. She denies fever, diarrhea, abdominal pain, flank pain or urinary symptoms. .  She was evaluated in the ED, reviewed note IVF and antiemetics given. hgb 10.4, lipase 126, ua neg LE, nitrites, creatinine 1.5. CT abdomen without acute findings: descending colon diverticulosis and mild nonspecific enteritis. She is feeling better today, able to tolerate breakfast.   She has anemia of chronic disease and CKD 3 with ED levels even improved from previous checks. Pt says she has stopped all medications worried these may have contributed to symptoms. No recent medication changes. I am thankful today she comes with her daughter Sebastián Edmondson ([phone]) and grand daughter Errol Bustillos ([phone]). Who is now caring for her since vomiting episode last night, and pt has stayed with her daughter last night. I discussed regarding safety concern with recurrent falls and memory issues. Daughter says she has been this way for years, says she has a diagnosis of bipolar disorder and schizophrenia. Pt denies hallucinations or manic symptoms currently. They agree regarding concern for safety and will now assist with medical supervision moving forward. I updated family members regarding medications list, specialists and future appointments. Advised they join her to these appointments  Provided #s   COPD- pulm Dr. Callahan Heading  Memory/ dizzinessNeurology- NP bibealt/ Cardiology- Dr. Lay Reid  Depression/bipolar/ schizophrenia- [facility] Janine mindania  Anemia of chronic disease- SHAYNE Montelongo  CHRIS-Dr. Young  CKD- dr. Benny Rousseau (upcoming)  Vertigo- ENT Dr. Shruti Lewis (Upcoming)  Cervical DDD- JOLENE Dr. Kimberly Ang (upcoming)  Home health nurse (upcoming)     From visit 8.16.19  Frequent falls- says she has fallen multiple times since last visit.  Says at times she gets up feeling dizzy, at times she feels unsteady with walking, at times she does not even remember if she has tripped, but finds herself on the ground. Most recent fall a last week she fell backwards and reports worsening back pain of the entire back. This has been ongoing for years now. She does have chronic neuropathy, R foot drop and uses her walker consistently. She says she has a shower chair and rails to keep her safe. She has a alert necklace. She lives by herself and brother who used to check on her is now sick. she had recent neuro evaluation for these complaints. freq falls/gait issues/dizziness and essentially no concerning findings with no further recommendations. MRI: showing chronic ischemic changes on brain MRI, mild bilateral carotid stenosis and EEG wnl. She has been evaluated by cardiology and has had normal cardiac stress test 3/19. She says she had home health nurse/aid a few years ago, however she decided to discontinue this as she was feeling uneasy with strangers around, and frankly she did not find helpful for her at all. She is not the best historian either, as she jumps from topic to topic. Depression-says following [facility], reviewed note to continue current medication and concern for home safety with her frequent falls    NIDDM w/ nephropathy taking [place]. (metformin prev d/mariella with declining gfr) She reports chronic bilateral feet neuropathy is about the same, refractory to neurontin. Reviewed labs anemia is stable, Creatinine is worsening     She has chronic anemia thought to be due to anemia of chronic disease. She reports GI work up 2014 normal.VOA notes state likely due to renal insufficiency, to consider aranesp, she has not followed through with this. HL- taking lipitor    OAB- taking oxybutynin and responding well to this. GERD- on prilosec. Chronic neck pain with radiculopathy- reports following dr. Leonel Wan for this, currently on flexeril and norco prn.  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 Asthma-doing well she says, on ICS and LAMA for maintenance,  following Dr. Rose Malik    CHRIS on CPAP- reports consistent use,  following  Dr. Heather Webster    ROS:  See HPI, all others negative    ROS random episodes of chills, no wt loss/appetite changes, no hot flashes/night sweates/ fever    Patient Active Problem List   Diagnosis Code    Pure hypercholesterolemia E78.00    Overactive bladder N32.81    Chronic allergic rhinitis J30.9    Anemia R83.4    Uncomplicated asthma V93.129    Type 2 diabetes mellitus without complication, without long-term current use of insulin ([facility]) E11.9       Current Outpatient Prescriptions   Medication Sig Dispense Refill    pantoprazole (PROTONIX) 40 mg tablet Take 1 Tab by mouth daily. 90 Tab 0    metFORMIN ER (GLUCOPHAGE XR) 500 mg tablet Take 2 Tabs by mouth daily (with dinner). 180 Tab 0    montelukast (SINGULAIR) 10 mg tablet Take 1 Tab by mouth daily. 90 Tab 2    cyclobenzaprine (FLEXERIL) 5 mg tablet Take 5 mg by mouth three (3) times daily as needed for Muscle Spasm(s).  aspirin delayed-release 81 mg tablet Take  by mouth daily.  cyanocobalamin (VITAMIN B12) 500 mcg tablet Take 500 mcg by mouth daily.  fluticasone-vilanterol (BREO ELLIPTA) 200-25 mcg/dose inhaler Take 1 Puff by inhalation daily.  atorvastatin (LIPITOR) 10 mg tablet Take 1 Tab by mouth daily. 90 Tab 0    oxybutynin (DITROPAN) 5 mg tablet Take 1 Tab by mouth two (2) times a day. 60 Tab 0    SITagliptin (JANUVIA) 50 mg tablet 50 mg.      gabapentin (NEURONTIN) 100 mg capsule 100 mg.      fluticasone (VERAMYST) 27.5 mcg/actuation nasal spray [place]  in Nose.  cycloSPORINE (RESTASIS) 0.05 % ophthalmic emulsion Instill 1 Drop in affected eye(s) Every 12 hours.       HYDROcodone-acetaminophen (NORCO) 5-325 mg per tablet   0    ipratropium-albuterol (COMBIVENT RESPIMAT)  mcg/actuation inhaler Take 1 inhalation inhaled by mouth Take As Needed.  ferrous sulfate 325 mg (65 mg iron) tablet 325 mg.      mometasone-formoterol (DULERA) 100-5 mcg/actuation HFA inhaler Take 2 Puffs by inhalation two (2) times a day. Allergies   Allergen Reactions    Iodine Swelling    Nabumetone Swelling    Penicillins Unknown (comments)    Shellfish Containing Products Swelling    Sulfa (Sulfonamide Antibiotics) Swelling       Past Medical History:   Diagnosis Date    Allergic rhinitis     Anemia     Asthma     Candida vaginitis     CKD (chronic kidney disease) stage 3, GFR 30-59 ml/min     COPD (chronic obstructive pulmonary disease) (HCC)     Diabetes (HCC)     Diabetes (HCC)     GERD (gastroesophageal reflux disease)     h pylori gastritis egd 11/14 dr Puja Hutchison    Headache     Hx of colonoscopy 11/05/2014    normal rpt 10 years, 11/2024, dr. Puja Hutchison    Hypercholesterolemia     Mild dementia     OAB (overactive bladder)     CHRIS on CPAP     Radiculopathy     Urinary incontinence     UTI (urinary tract infection)        Social History     Social History    Marital status:      Spouse name: N/A    Number of children: N/A    Years of education: N/A     Occupational History    Not on file. Social History Main Topics    Smoking status: Former Smoker     Years: 20.00    Smokeless tobacco: Former User     Quit date: 1/1/1999    Alcohol use Yes    Drug use: No    Sexual activity: Not Currently     Partners: Male     Other Topics Concern    Not on file     Social History Narrative       History reviewed. No pertinent family history.       OBJECTIVE    Physical Exam:     Visit Vitals  /64 (BP 1 Location: Left arm, BP Patient Position: Sitting)   Pulse 60   Temp 98.3 °F (36.8 °C) (Oral)   Resp 16   Ht 5' 1\" (1.549 m)   Wt 145 lb (65.8 kg)   SpO2 98%   BMI 27.40 kg/m²       General: alert, AA, in no apparent distress or pain  HEENT: throat and pharynx clear, tm intact, eac patentt   CVS: normal rate, regular rhythm, distinct S1 and S2  Lungs:clear to ausculation bilaterally, no crackles, wheezing or rhonchi noted  Abdomen: soft, NT, ND  Extremities: No edema, no cyanosis  Psych:  mood and affect normal    ASSESSMENT/PLAN  Diagnoses and all orders for this visit:    Non-intractable vomiting with nausea, unspecified vomiting type  Clinically improved  Dyspepsia? Cont increased hydration, prn phenergan  Will recheck lipase mildly elevated in ED, CT abdomen without acute findings  -     LIPASE; Future  -     METABOLIC PANEL, COMPREHENSIVE; Future  She may resume her medications, hold januvia until we check lipase levels again    Schizophrenia, unspecified type ([facility]  Advised to inform her psychiatrist [facility] of these diagnosis and to have daughter/granddaughter join her during visits for improved communication/care    Bipolar affective disorder, remission status unspecified ([facility] [facility]    Anemia of chronic disease  Stable  hgb baseline 10's  Consideration of aransep, following [facility] dr. Jen Curiel    CKD 3  Avoid nsaids  Monitoring  Was referred to nephrology on last visit, provided #    Frequent falls  Family members now involved with care  Updated them regarding medications, specialists, follow-ups  Provided #s, home health to be instituted    Likely multifactorial: peripheral/DM neuropathy, foot drop, she may have some vascular dementia setting in as well. Her neuro/cards eval was essentially neg.  Mild carotid stenosis, chronic cerebral ischemic changes  Concern for safety as she lives by herself  Advised consistent walker use, fall precautions discussed  Will have ENT evaluate dizziness/vertigo, will get second opinion on cervical stenosis if contributing and how to optimize care, will refer to dr. Odilia Winters  Referral to home health for safety evaluation      Gait disturbance  No significant findings on neuo work up  Optimize vascular/metabolic syndrome control  On statin, ASA  Refractory to gabapentin    Recurrent  depression  Fair control  Cont wellbutrin and zoloft  Following [facility] psychiatry    Bilateral carotid stenosis  Mild  Optimize med management  Monitoring    Cerebral microvascular disease  Optimize metabolic syndrome mgt     Type 2 diabetes mellitus without complication, without long-term current use of insulin (HCC)  Controlled  a1c 6.3  Hold  januvia with elevated lipase  Monitoring (off metformin due to worsening gfrl)     Pure hypercholesterolemia  On lipitor     Overactive bladder  Responding to ditropan, to cont    Chronic bronchitis, unspecified chronic bronchitis type ([address]  Controlled  On ICS, prn WILLARD/ipatropium  Following pulm dr. Lui Landry    Keep appt in sept or sooner prn    I spent greater than 40 minutes with pt with greater than 50% of the face to face time counseling and coordinating care       Patient understands plan of care. Patient has provided input and agrees with goals.

## 2019-08-19 NOTE — PROGRESS NOTES
1. Have you been to the ER, urgent care clinic since your last visit? Hospitalized since your last visit? Yes Er 08/19/2019    2. Have you seen or consulted any other health care providers outside of the [address] [place] [address] since your last visit? Include any pap smears or colon screening.  No

## 2019-08-19 NOTE — PATIENT INSTRUCTIONS
Nausea and Vomiting: Care Instructions  Your Care Instructions    When you are nauseated, you may feel weak and sweaty and notice a lot of saliva in your mouth. Nausea often leads to vomiting. Most of the time you do not need to worry about nausea and vomiting, but they can be signs of other illnesses. Two common causes of nausea and vomiting are stomach flu and food poisoning. Nausea and vomiting from viral stomach flu will usually start to improve within 24 hours. Nausea and vomiting from food poisoning may last from 12 to 48 hours. The doctor has checked you carefully, but problems can develop later. If you notice any problems or new symptoms, get medical treatment right away. Follow-up care is a key part of your treatment and safety. Be sure to make and go to all appointments, and call your doctor if you are having problems. It's also a good idea to know your test results and keep a list of the medicines you take. How can you care for yourself at home? · To prevent dehydration, drink plenty of fluids, enough so that your urine is light yellow or clear like water. Choose water and other caffeine-free clear liquids until you feel better. If you have kidney, heart, or liver disease and have to limit fluids, talk with your doctor before you increase the amount of fluids you drink. · Rest in bed until you feel better. · When you are able to eat, try clear soups, mild foods, and liquids until all symptoms are gone for 12 to 48 hours. Other good choices include dry toast, crackers, cooked cereal, and gelatin dessert, such as Jell-O. When should you call for help? Call 911 anytime you think you may need emergency care. For example, call if:    · You passed out (lost consciousness).    Call your doctor now or seek immediate medical care if:    · You have symptoms of dehydration, such as:  ? Dry eyes and a dry mouth. ? Passing only a little dark urine. ?  Feeling thirstier than usual.     · You have new or worsening belly pain.     · You have a new or higher fever.     · You vomit blood or what looks like coffee grounds.    Watch closely for changes in your health, and be sure to contact your doctor if:    · You have ongoing nausea and vomiting.     · Your vomiting is getting worse.     · Your vomiting lasts longer than 2 days.     · You are not getting better as expected. Where can you learn more? Go to http://danika-sophia.info/. Enter 25 [id number] in the search box to learn more about \"Nausea and Vomiting: Care Instructions. \"  Current as of: September 23, 2018  Content Version: 12.1  © 8248-0948 Health[employer], [employer]. Care instructions adapted under license by [employer] (which disclaims liability or warranty for this information). If you have questions about a medical condition or this instruction, always ask your healthcare professional. [address] any warranty or liability for your use of this information.

## 2019-08-26 ENCOUNTER — TELEPHONE (OUTPATIENT)
Dept: FAMILY MEDICINE CLINIC | Age: 69
End: 2019-08-26

## 2019-08-26 DIAGNOSIS — R26.9 GAIT DISTURBANCE: ICD-10-CM

## 2019-08-26 DIAGNOSIS — E11.9 TYPE 2 DIABETES MELLITUS WITHOUT COMPLICATION, WITHOUT LONG-TERM CURRENT USE OF INSULIN (HCC): ICD-10-CM

## 2019-08-26 DIAGNOSIS — N18.30 CKD (CHRONIC KIDNEY DISEASE) STAGE 3, GFR 30-59 ML/MIN (HCC): Primary | ICD-10-CM

## 2019-08-26 DIAGNOSIS — R29.6 FREQUENT FALLS: ICD-10-CM

## 2019-08-26 NOTE — TELEPHONE ENCOUNTER
Pt daughter [facility] is calling in [facility]. home care orders that suppose to be place. Please call [facility]  at your earliest convenience.

## 2019-08-27 ENCOUNTER — HOME HEALTH ADMISSION (OUTPATIENT)
Dept: HOME HEALTH SERVICES | Facility: HOME HEALTH | Age: 69
End: 2019-08-27
Payer: MEDICARE

## 2019-08-28 ENCOUNTER — HOME CARE VISIT (OUTPATIENT)
Dept: HOME HEALTH SERVICES | Facility: HOME HEALTH | Age: 69
End: 2019-08-28

## 2019-08-28 ENCOUNTER — HOME CARE VISIT (OUTPATIENT)
Dept: SCHEDULING | Facility: HOME HEALTH | Age: 69
End: 2019-08-28
Payer: MEDICARE

## 2019-08-28 VITALS
TEMPERATURE: 96.7 F | SYSTOLIC BLOOD PRESSURE: 138 MMHG | RESPIRATION RATE: 25 BRPM | HEART RATE: 71 BPM | OXYGEN SATURATION: 96 % | DIASTOLIC BLOOD PRESSURE: 68 MMHG

## 2019-08-28 DIAGNOSIS — F33.9 RECURRENT DEPRESSION (HCC): ICD-10-CM

## 2019-08-28 PROCEDURE — G0151 HHCP-SERV OF PT,EA 15 MIN: HCPCS

## 2019-08-28 PROCEDURE — 3331090001 HH PPS REVENUE CREDIT

## 2019-08-28 PROCEDURE — 3331090002 HH PPS REVENUE DEBIT

## 2019-08-28 PROCEDURE — 400013 HH SOC

## 2019-08-28 NOTE — TELEPHONE ENCOUNTER
This pharmacy faxed over request for the following prescriptions to be filled:    Medication requested :   Requested Prescriptions     Pending Prescriptions Disp Refills    pantoprazole (PROTONIX) 40 mg tablet 90 Tab 3     Sig: TAKE 1 TABLET BY MOUTH ONCE DAILY    montelukast (SINGULAIR) 10 mg tablet 90 Tab 2     Sig: Take 1 Tab by mouth daily.  oxybutynin (DITROPAN) 5 mg tablet 90 Tab 2     Sig: Take 1 Tab by mouth two (2) times a day.  buPROPion XL (WELLBUTRIN XL) 150 mg tablet 90 Tab 0     Sig: Take 1 Tab by mouth every morning.     sertraline (ZOLOFT) 100 mg tablet 180 Tab 0    atorvastatin (LIPITOR) 10 mg tablet 90 Tab 3     Sig: TAKE 1 TABLET BY MOUTH ONCE DAILY     PCP: [address] [place] [address] or Print: [employer]   Mail order or Local [id number]    Scheduled appointment if not seen by current providers in office: lov 8/19/19 anthony 9/16/19

## 2019-08-29 ENCOUNTER — HOME CARE VISIT (OUTPATIENT)
Dept: HOME HEALTH SERVICES | Facility: HOME HEALTH | Age: 69
End: 2019-08-29
Payer: MEDICARE

## 2019-08-29 PROCEDURE — 3331090001 HH PPS REVENUE CREDIT

## 2019-08-29 PROCEDURE — 3331090002 HH PPS REVENUE DEBIT

## 2019-08-29 RX ORDER — OXYBUTYNIN CHLORIDE 5 MG/1
5 TABLET ORAL 2 TIMES DAILY
Qty: 180 TAB | Refills: 0 | Status: SHIPPED | OUTPATIENT
Start: 2019-08-29 | End: 2019-10-13 | Stop reason: SDUPTHER

## 2019-08-29 RX ORDER — BUPROPION HYDROCHLORIDE 150 MG/1
150 TABLET ORAL
Qty: 90 TAB | Refills: 0 | OUTPATIENT
Start: 2019-08-29

## 2019-08-29 RX ORDER — ATORVASTATIN CALCIUM 10 MG/1
10 TABLET, FILM COATED ORAL DAILY
Qty: 90 TAB | Refills: 3 | Status: SHIPPED | OUTPATIENT
Start: 2019-08-29 | End: 2019-11-26 | Stop reason: SDUPTHER

## 2019-08-29 RX ORDER — PANTOPRAZOLE SODIUM 40 MG/1
40 TABLET, DELAYED RELEASE ORAL DAILY
Qty: 90 TAB | Refills: 3 | Status: SHIPPED | OUTPATIENT
Start: 2019-08-29 | End: 2019-11-26 | Stop reason: SDUPTHER

## 2019-08-29 RX ORDER — MONTELUKAST SODIUM 10 MG/1
10 TABLET ORAL DAILY
Qty: 90 TAB | Refills: 2 | Status: SHIPPED | OUTPATIENT
Start: 2019-08-29 | End: 2019-11-26 | Stop reason: SDUPTHER

## 2019-08-29 RX ORDER — SERTRALINE HYDROCHLORIDE 100 MG/1
TABLET, FILM COATED ORAL
Qty: 180 TAB | Refills: 0 | OUTPATIENT
Start: 2019-08-29

## 2019-08-30 ENCOUNTER — HOME CARE VISIT (OUTPATIENT)
Dept: SCHEDULING | Facility: HOME HEALTH | Age: 69
End: 2019-08-30
Payer: MEDICARE

## 2019-08-30 VITALS
HEART RATE: 66 BPM | TEMPERATURE: 96.8 F | OXYGEN SATURATION: 99 % | DIASTOLIC BLOOD PRESSURE: 68 MMHG | RESPIRATION RATE: 14 BRPM | SYSTOLIC BLOOD PRESSURE: 125 MMHG

## 2019-08-30 PROCEDURE — G0151 HHCP-SERV OF PT,EA 15 MIN: HCPCS

## 2019-08-30 PROCEDURE — 3331090002 HH PPS REVENUE DEBIT

## 2019-08-30 PROCEDURE — 3331090001 HH PPS REVENUE CREDIT

## 2019-08-31 PROCEDURE — 3331090002 HH PPS REVENUE DEBIT

## 2019-08-31 PROCEDURE — 3331090001 HH PPS REVENUE CREDIT

## 2019-09-01 PROCEDURE — 3331090002 HH PPS REVENUE DEBIT

## 2019-09-01 PROCEDURE — 3331090001 HH PPS REVENUE CREDIT

## 2019-09-02 PROCEDURE — 3331090002 HH PPS REVENUE DEBIT

## 2019-09-02 PROCEDURE — 3331090001 HH PPS REVENUE CREDIT

## 2019-09-03 ENCOUNTER — HOME CARE VISIT (OUTPATIENT)
Dept: SCHEDULING | Facility: HOME HEALTH | Age: 69
End: 2019-09-03
Payer: MEDICARE

## 2019-09-03 PROCEDURE — 3331090001 HH PPS REVENUE CREDIT

## 2019-09-03 PROCEDURE — 3331090002 HH PPS REVENUE DEBIT

## 2019-09-04 ENCOUNTER — HOME CARE VISIT (OUTPATIENT)
Dept: SCHEDULING | Facility: HOME HEALTH | Age: 69
End: 2019-09-04
Payer: MEDICARE

## 2019-09-04 VITALS
HEART RATE: 72 BPM | OXYGEN SATURATION: 97 % | SYSTOLIC BLOOD PRESSURE: 122 MMHG | RESPIRATION RATE: 18 BRPM | TEMPERATURE: 96.8 F | DIASTOLIC BLOOD PRESSURE: 67 MMHG

## 2019-09-04 DIAGNOSIS — F33.9 RECURRENT DEPRESSION (HCC): ICD-10-CM

## 2019-09-04 PROCEDURE — 3331090001 HH PPS REVENUE CREDIT

## 2019-09-04 PROCEDURE — 3331090002 HH PPS REVENUE DEBIT

## 2019-09-04 PROCEDURE — G0151 HHCP-SERV OF PT,EA 15 MIN: HCPCS

## 2019-09-04 NOTE — TELEPHONE ENCOUNTER
Patient called and states [facility] has never filled any medication. Dr. Reyes Miller does not prescribed Zoloft or Wellbutrin.  Please advise

## 2019-09-04 NOTE — PROGRESS NOTES
Patient identified with 2 identifiers (name and ).   Patient aware of Improving kidney function test, pancreatic enzyme level is normal

## 2019-09-05 ENCOUNTER — HOME CARE VISIT (OUTPATIENT)
Dept: SCHEDULING | Facility: HOME HEALTH | Age: 69
End: 2019-09-05
Payer: MEDICARE

## 2019-09-05 VITALS
RESPIRATION RATE: 17 BRPM | HEART RATE: 70 BPM | TEMPERATURE: 97 F | SYSTOLIC BLOOD PRESSURE: 133 MMHG | OXYGEN SATURATION: 99 % | DIASTOLIC BLOOD PRESSURE: 64 MMHG

## 2019-09-05 PROCEDURE — 3331090002 HH PPS REVENUE DEBIT

## 2019-09-05 PROCEDURE — G0151 HHCP-SERV OF PT,EA 15 MIN: HCPCS

## 2019-09-05 PROCEDURE — 3331090001 HH PPS REVENUE CREDIT

## 2019-09-05 RX ORDER — BUPROPION HYDROCHLORIDE 150 MG/1
150 TABLET ORAL
Qty: 90 TAB | Refills: 0 | OUTPATIENT
Start: 2019-09-05

## 2019-09-05 RX ORDER — SERTRALINE HYDROCHLORIDE 100 MG/1
TABLET, FILM COATED ORAL
Qty: 180 TAB | Refills: 0 | OUTPATIENT
Start: 2019-09-05

## 2019-09-06 ENCOUNTER — HOME CARE VISIT (OUTPATIENT)
Dept: SCHEDULING | Facility: HOME HEALTH | Age: 69
End: 2019-09-06
Payer: MEDICARE

## 2019-09-06 PROCEDURE — 3331090001 HH PPS REVENUE CREDIT

## 2019-09-06 PROCEDURE — 3331090002 HH PPS REVENUE DEBIT

## 2019-09-07 PROCEDURE — 3331090001 HH PPS REVENUE CREDIT

## 2019-09-07 PROCEDURE — 3331090002 HH PPS REVENUE DEBIT

## 2019-09-08 PROCEDURE — 3331090002 HH PPS REVENUE DEBIT

## 2019-09-08 PROCEDURE — 3331090001 HH PPS REVENUE CREDIT

## 2019-09-09 PROCEDURE — 3331090001 HH PPS REVENUE CREDIT

## 2019-09-09 PROCEDURE — 3331090002 HH PPS REVENUE DEBIT

## 2019-09-10 ENCOUNTER — HOME CARE VISIT (OUTPATIENT)
Dept: SCHEDULING | Facility: HOME HEALTH | Age: 69
End: 2019-09-10
Payer: MEDICARE

## 2019-09-10 DIAGNOSIS — F33.9 RECURRENT DEPRESSION (HCC): ICD-10-CM

## 2019-09-10 PROCEDURE — G0151 HHCP-SERV OF PT,EA 15 MIN: HCPCS

## 2019-09-10 PROCEDURE — 3331090002 HH PPS REVENUE DEBIT

## 2019-09-10 PROCEDURE — 3331090001 HH PPS REVENUE CREDIT

## 2019-09-10 RX ORDER — BUPROPION HYDROCHLORIDE 150 MG/1
150 TABLET ORAL
Qty: 90 TAB | Refills: 0 | OUTPATIENT
Start: 2019-09-10

## 2019-09-10 RX ORDER — OXYBUTYNIN CHLORIDE 5 MG/1
5 TABLET ORAL 2 TIMES DAILY
Qty: 180 TAB | Refills: 0 | OUTPATIENT
Start: 2019-09-10

## 2019-09-10 NOTE — TELEPHONE ENCOUNTER
This patient contacted office for the following prescriptions to be filled:    Medication requested :   Requested Prescriptions     Pending Prescriptions Disp Refills    buPROPion XL (WELLBUTRIN XL) 150 mg tablet 90 Tab 0     Sig: Take 1 Tab by mouth every morning.      PCP:  "[employer] [address] or Print: [address] [facility]  Mail order or Local pharmacy [phone]    Scheduled appointment if not seen by current providers in office: LOV:8-19-19 NOV:9-12-19

## 2019-09-11 ENCOUNTER — HOME CARE VISIT (OUTPATIENT)
Dept: SCHEDULING | Facility: HOME HEALTH | Age: 69
End: 2019-09-11
Payer: MEDICARE

## 2019-09-11 VITALS
HEART RATE: 71 BPM | RESPIRATION RATE: 15 BRPM | OXYGEN SATURATION: 98 % | TEMPERATURE: 97.8 F | DIASTOLIC BLOOD PRESSURE: 62 MMHG | SYSTOLIC BLOOD PRESSURE: 120 MMHG

## 2019-09-11 VITALS
HEART RATE: 72 BPM | OXYGEN SATURATION: 99 % | RESPIRATION RATE: 15 BRPM | TEMPERATURE: 97.2 F | SYSTOLIC BLOOD PRESSURE: 113 MMHG | DIASTOLIC BLOOD PRESSURE: 72 MMHG

## 2019-09-11 PROCEDURE — 3331090001 HH PPS REVENUE CREDIT

## 2019-09-11 PROCEDURE — G0151 HHCP-SERV OF PT,EA 15 MIN: HCPCS

## 2019-09-11 PROCEDURE — 3331090002 HH PPS REVENUE DEBIT

## 2019-09-12 ENCOUNTER — OFFICE VISIT (OUTPATIENT)
Dept: FAMILY MEDICINE CLINIC | Age: 69
End: 2019-09-12

## 2019-09-12 VITALS
HEART RATE: 62 BPM | TEMPERATURE: 97.5 F | DIASTOLIC BLOOD PRESSURE: 62 MMHG | SYSTOLIC BLOOD PRESSURE: 138 MMHG | HEIGHT: 61 IN | BODY MASS INDEX: 27.72 KG/M2 | OXYGEN SATURATION: 99 % | RESPIRATION RATE: 16 BRPM | WEIGHT: 146.8 LBS

## 2019-09-12 DIAGNOSIS — F20.9 SCHIZOPHRENIA, UNSPECIFIED TYPE (HCC): ICD-10-CM

## 2019-09-12 DIAGNOSIS — E11.9 TYPE 2 DIABETES MELLITUS WITHOUT COMPLICATION, WITHOUT LONG-TERM CURRENT USE OF INSULIN (HCC): ICD-10-CM

## 2019-09-12 DIAGNOSIS — G43.009 MIGRAINE WITHOUT AURA AND WITHOUT STATUS MIGRAINOSUS, NOT INTRACTABLE: Primary | ICD-10-CM

## 2019-09-12 DIAGNOSIS — F31.9 BIPOLAR AFFECTIVE DISORDER, REMISSION STATUS UNSPECIFIED (HCC): ICD-10-CM

## 2019-09-12 DIAGNOSIS — R29.6 FREQUENT FALLS: ICD-10-CM

## 2019-09-12 DIAGNOSIS — N18.30 CKD (CHRONIC KIDNEY DISEASE) STAGE 3, GFR 30-59 ML/MIN (HCC): ICD-10-CM

## 2019-09-12 DIAGNOSIS — D64.9 ANEMIA, UNSPECIFIED TYPE: ICD-10-CM

## 2019-09-12 DIAGNOSIS — I67.89 CEREBRAL MICROVASCULAR DISEASE: ICD-10-CM

## 2019-09-12 DIAGNOSIS — E78.00 PURE HYPERCHOLESTEROLEMIA: ICD-10-CM

## 2019-09-12 DIAGNOSIS — N32.81 OVERACTIVE BLADDER: ICD-10-CM

## 2019-09-12 DIAGNOSIS — F33.9 RECURRENT DEPRESSION (HCC): ICD-10-CM

## 2019-09-12 DIAGNOSIS — J42 CHRONIC BRONCHITIS, UNSPECIFIED CHRONIC BRONCHITIS TYPE (HCC): ICD-10-CM

## 2019-09-12 PROCEDURE — 3331090002 HH PPS REVENUE DEBIT

## 2019-09-12 PROCEDURE — 3331090001 HH PPS REVENUE CREDIT

## 2019-09-12 RX ORDER — DULOXETIN HYDROCHLORIDE 30 MG/1
30 CAPSULE, DELAYED RELEASE ORAL DAILY
Qty: 30 CAP | Refills: 0 | Status: SHIPPED | OUTPATIENT
Start: 2019-09-12 | End: 2019-11-26

## 2019-09-12 NOTE — PROGRESS NOTES
1. Have you been to the ER, urgent care clinic since your last visit? Hospitalized since your last visit? No    2. Have you seen or consulted any other health care providers outside of the [address] [place] [address] since your last visit? Include any pap smears or colon screening. No      Patient states she has joined pill pack. Also, patient states she is receiving physical therapy through home health - has visit scheduled tomorrow at home. Last dm foot exam - 1 Foot [address] LOV: 6/2019. Patient states she has another podiatry [facility] on 9/25/19, but she is not keeping appointment. She wants to use another podiatrist since not happy at service at [address] [place] [address].     Chief Complaint   Patient presents with    Bipolar    Anemia    Chronic Kidney Disease    Fall     frequent falls    Depression    Diabetes     blood sugar reading was 138 yesterday morning prior to eating    Cholesterol Problem    Other     schizophrenia    Headache    Other     concentration/focus concerns - wants to discuss    Medication Evaluation     wants to discuss supplement use - needs to know if it's okay to take    Sleep Problem

## 2019-09-13 ENCOUNTER — HOME CARE VISIT (OUTPATIENT)
Dept: SCHEDULING | Facility: HOME HEALTH | Age: 69
End: 2019-09-13
Payer: MEDICARE

## 2019-09-13 VITALS
OXYGEN SATURATION: 98 % | SYSTOLIC BLOOD PRESSURE: 118 MMHG | DIASTOLIC BLOOD PRESSURE: 63 MMHG | TEMPERATURE: 97.3 F | HEART RATE: 73 BPM | RESPIRATION RATE: 18 BRPM

## 2019-09-13 PROCEDURE — 3331090002 HH PPS REVENUE DEBIT

## 2019-09-13 PROCEDURE — 3331090001 HH PPS REVENUE CREDIT

## 2019-09-13 PROCEDURE — G0151 HHCP-SERV OF PT,EA 15 MIN: HCPCS

## 2019-09-14 PROCEDURE — 3331090001 HH PPS REVENUE CREDIT

## 2019-09-14 PROCEDURE — 3331090002 HH PPS REVENUE DEBIT

## 2019-09-15 PROCEDURE — 3331090002 HH PPS REVENUE DEBIT

## 2019-09-15 PROCEDURE — 3331090001 HH PPS REVENUE CREDIT

## 2019-09-16 PROCEDURE — 3331090001 HH PPS REVENUE CREDIT

## 2019-09-16 PROCEDURE — 3331090002 HH PPS REVENUE DEBIT

## 2019-09-17 ENCOUNTER — HOME CARE VISIT (OUTPATIENT)
Dept: SCHEDULING | Facility: HOME HEALTH | Age: 69
End: 2019-09-17
Payer: MEDICARE

## 2019-09-17 VITALS
SYSTOLIC BLOOD PRESSURE: 110 MMHG | TEMPERATURE: 97 F | RESPIRATION RATE: 16 BRPM | HEART RATE: 71 BPM | OXYGEN SATURATION: 99 % | DIASTOLIC BLOOD PRESSURE: 68 MMHG

## 2019-09-17 PROCEDURE — 3331090002 HH PPS REVENUE DEBIT

## 2019-09-17 PROCEDURE — 3331090001 HH PPS REVENUE CREDIT

## 2019-09-17 PROCEDURE — G0151 HHCP-SERV OF PT,EA 15 MIN: HCPCS

## 2019-09-18 PROCEDURE — 3331090002 HH PPS REVENUE DEBIT

## 2019-09-18 PROCEDURE — 3331090001 HH PPS REVENUE CREDIT

## 2019-09-19 ENCOUNTER — HOME CARE VISIT (OUTPATIENT)
Dept: SCHEDULING | Facility: HOME HEALTH | Age: 69
End: 2019-09-19
Payer: MEDICARE

## 2019-09-19 VITALS
TEMPERATURE: 96.5 F | DIASTOLIC BLOOD PRESSURE: 72 MMHG | HEART RATE: 55 BPM | OXYGEN SATURATION: 99 % | SYSTOLIC BLOOD PRESSURE: 119 MMHG | RESPIRATION RATE: 15 BRPM

## 2019-09-19 PROCEDURE — 3331090001 HH PPS REVENUE CREDIT

## 2019-09-19 PROCEDURE — 3331090003 HH PPS REVENUE ADJ

## 2019-09-19 PROCEDURE — G0151 HHCP-SERV OF PT,EA 15 MIN: HCPCS

## 2019-09-19 PROCEDURE — 3331090002 HH PPS REVENUE DEBIT

## 2019-09-23 DIAGNOSIS — F33.9 RECURRENT DEPRESSION (HCC): ICD-10-CM

## 2019-09-23 RX ORDER — BUPROPION HYDROCHLORIDE 150 MG/1
150 TABLET ORAL
Qty: 90 TAB | Refills: 0 | OUTPATIENT
Start: 2019-09-23

## 2019-09-23 NOTE — TELEPHONE ENCOUNTER
This pharmacy faxed over request for the following prescriptions to be filled:    Medication requested :   Requested Prescriptions     Pending Prescriptions Disp Refills    buPROPion XL (WELLBUTRIN XL) 150 mg tablet 90 Tab 0     Sig: Take 1 Tab by mouth every morning.      PCP: [address] [place] [address] or Print: SHIRIN INMAN [facility]  Mail order or Local pharmacy [phone]    Scheduled appointment if not seen by current providers in office: LOV: 9-12-19  SNF:[id number]

## 2019-09-24 NOTE — TELEPHONE ENCOUNTER
This pharmacy faxed over request for the following prescriptions to be filled:    Medication requested :   Requested Prescriptions     Pending Prescriptions Disp Refills    fluticasone (VERAMYST) 27.5 mcg/actuation nasal spray       Sig: by Nasal route.      PCP: [address] [place] [address] Street or Print: [facility]  Mail order or Local pharmacy mail order     Scheduled appointment if not seen by current providers in office:  LOV 9/12/2019 f/u 10/15/2019

## 2019-09-30 NOTE — TELEPHONE ENCOUNTER
Pt advised about medication that she needs to get from prescribing Drs and voice understanding .  Closing encounter

## 2019-10-10 ENCOUNTER — HOSPITAL ENCOUNTER (OUTPATIENT)
Dept: LAB | Age: 69
Discharge: HOME | End: 2019-10-10
Payer: MEDICARE

## 2019-10-10 DIAGNOSIS — D64.9 ANEMIA, UNSPECIFIED TYPE: ICD-10-CM

## 2019-10-10 DIAGNOSIS — E11.9 TYPE 2 DIABETES MELLITUS WITHOUT COMPLICATION, WITHOUT LONG-TERM CURRENT USE OF INSULIN (HCC): ICD-10-CM

## 2019-10-10 LAB
ANION GAP SERPL CALC-SCNC: 3 MMOL/L (ref 3–18)
BASOPHILS # BLD: 0 K/UL (ref 0–0.1)
BASOPHILS NFR BLD: 0 % (ref 0–2)
BUN SERPL-MCNC: 16 MG/DL (ref 7–18)
BUN/CREAT SERPL: 10 (ref 12–20)
CALCIUM SERPL-MCNC: 9.1 MG/DL (ref 8.5–10.1)
CHLORIDE SERPL-SCNC: 109 MMOL/L (ref 100–111)
CO2 SERPL-SCNC: 30 MMOL/L (ref 21–32)
CREAT SERPL-MCNC: 1.59 MG/DL (ref 0.6–1.3)
DIFFERENTIAL METHOD BLD: ABNORMAL
EOSINOPHIL # BLD: 0.2 K/UL (ref 0–0.4)
EOSINOPHIL NFR BLD: 4 % (ref 0–5)
ERYTHROCYTE [DISTWIDTH] IN BLOOD BY AUTOMATED COUNT: 16.8 % (ref 11.6–14.5)
GLUCOSE SERPL-MCNC: 114 MG/DL (ref 74–99)
HBA1C MFR BLD: 6.9 % (ref 4.2–5.6)
HCT VFR BLD AUTO: 31.7 % (ref 35–45)
HGB BLD-MCNC: 10.2 G/DL (ref 12–16)
LYMPHOCYTES # BLD: 1.3 K/UL (ref 0.9–3.6)
LYMPHOCYTES NFR BLD: 32 % (ref 21–52)
MCH RBC QN AUTO: 26.1 PG (ref 24–34)
MCHC RBC AUTO-ENTMCNC: 32.2 G/DL (ref 31–37)
MCV RBC AUTO: 81.1 FL (ref 74–97)
MONOCYTES # BLD: 0.2 K/UL (ref 0.05–1.2)
MONOCYTES NFR BLD: 6 % (ref 3–10)
NEUTS SEG # BLD: 2.4 K/UL (ref 1.8–8)
NEUTS SEG NFR BLD: 58 % (ref 40–73)
PLATELET # BLD AUTO: 174 K/UL (ref 135–420)
PMV BLD AUTO: 9.7 FL (ref 9.2–11.8)
POTASSIUM SERPL-SCNC: 3.6 MMOL/L (ref 3.5–5.5)
RBC # BLD AUTO: 3.91 M/UL (ref 4.2–5.3)
SODIUM SERPL-SCNC: 142 MMOL/L (ref 136–145)
WBC # BLD AUTO: 4.1 K/UL (ref 4.6–13.2)

## 2019-10-10 PROCEDURE — 83036 HEMOGLOBIN GLYCOSYLATED A1C: CPT

## 2019-10-10 PROCEDURE — 36415 COLL VENOUS BLD VENIPUNCTURE: CPT

## 2019-10-10 PROCEDURE — 80048 BASIC METABOLIC PNL TOTAL CA: CPT

## 2019-10-10 PROCEDURE — 85025 COMPLETE CBC W/AUTO DIFF WBC: CPT

## 2019-10-21 DIAGNOSIS — F33.9 RECURRENT DEPRESSION (HCC): ICD-10-CM

## 2019-10-21 NOTE — TELEPHONE ENCOUNTER
This pharmacy faxed over request for the following prescriptions to be filled:    Medication requested :   Requested Prescriptions     Pending Prescriptions Disp Refills    buPROPion XL (WELLBUTRIN XL) 150 mg tablet 90 Tab 0     Sig: Take 1 Tab by mouth every morning.  sertraline (ZOLOFT) 100 mg tablet       Sig: Take 2 Tabs by mouth daily.  Indications: major depressive disorder     PCP: [address] [place] [address] or Print: [employer]  Mail order or Local pharmacy Mail Order     Scheduled appointment if not seen by current providers in office: LOV 9/12/2019 f/u 11/26/2019

## 2019-10-28 RX ORDER — BUPROPION HYDROCHLORIDE 150 MG/1
150 TABLET ORAL
Qty: 30 TAB | Refills: 0 | Status: SHIPPED | OUTPATIENT
Start: 2019-10-28 | End: 2020-04-07 | Stop reason: DRUGHIGH

## 2019-10-28 RX ORDER — SERTRALINE HYDROCHLORIDE 100 MG/1
200 TABLET, FILM COATED ORAL DAILY
Qty: 60 TAB | Refills: 0 | OUTPATIENT
Start: 2019-10-28

## 2019-10-28 NOTE — TELEPHONE ENCOUNTER
Only give 30 day supply, just this time. she needs to request from psychiatry office. pls notify pt.

## 2019-10-28 NOTE — TELEPHONE ENCOUNTER
Patient is calling about these two medications stating she is out of these medications. I advised her that Dr. Jason Richardson had denied these because she needs to request from her psychiatrist. Patient advised that she has not been able to get back to psychiatrist due to transportation issues. I let her know I would send message back, but that I was not sure that Dr. Jason Richardson would be able to refill these. Patient verbalized understanding.

## 2019-11-01 ENCOUNTER — DOCUMENTATION ONLY (OUTPATIENT)
Dept: ORTHOPEDIC SURGERY | Age: 69
End: 2019-11-01

## 2019-11-01 ENCOUNTER — OFFICE VISIT (OUTPATIENT)
Dept: ORTHOPEDIC SURGERY | Age: 69
End: 2019-11-01

## 2019-11-01 VITALS
HEART RATE: 58 BPM | DIASTOLIC BLOOD PRESSURE: 78 MMHG | SYSTOLIC BLOOD PRESSURE: 142 MMHG | OXYGEN SATURATION: 100 % | RESPIRATION RATE: 16 BRPM | WEIGHT: 148.6 LBS | BODY MASS INDEX: 28.05 KG/M2 | HEIGHT: 61 IN | TEMPERATURE: 98.2 F

## 2019-11-01 DIAGNOSIS — M48.02 CERVICAL SPINAL STENOSIS: Primary | ICD-10-CM

## 2019-11-01 DIAGNOSIS — M54.12 CERVICAL NEURITIS: ICD-10-CM

## 2019-11-01 DIAGNOSIS — M50.30 DDD (DEGENERATIVE DISC DISEASE), CERVICAL: ICD-10-CM

## 2019-11-01 DIAGNOSIS — F33.9 RECURRENT DEPRESSION (HCC): ICD-10-CM

## 2019-11-01 NOTE — PROGRESS NOTES
[facility]  [address], [address]  Phone: [phone]  Fax: [phone]        INITIAL CONSULTATION      HISTORY OF PRESENT ILLNESS:  Shree Benjamin is a 68 y.o. female whom is referred from Dannielle Crawford MD secondary to neck pain following fall at work in [address] [place] [address]. She rates her pain 8/10. She reports progressive, constant falls, at least 1 fall per day x 1 year. However she hasn't had a fall since July 2019. Pt has diffuse, widespread, pain complaint. She denies h/o spinal surgery. Pt reports improvement due to PT. She is performing her HEP daily. Pt recalls spinal injections through pain management years ago. Pt reports she was receiving trigger injections through Dr. Brady Wu. She reports Dr. Brady Wu discharged her two months ago due to improvement in pain and falls. Treated with Norco and Flexeril with benefit. Allergic to Flexeril, Nabumetone, Tramadol, and contrast. Pt is a poor historian. Pt denies fever, weight loss, or skin changes. PmHx overactive bladder, DM, anemia, stage 3 CKD, bipolar disorder, right knee replacement (2012), neuropathy. Note from Dannielle Crawford MD dated 8/15/19 indicating patient was seen with c/o multiple falls. Feels dizzy at times with unsteady gait. Week prior had fallen backwards. Worsening pain in the entire back. Chronic x several years. She additionally has a right foot drop and uses canes for ambulation. She has had a neuro workup. Chronic ischemic changes noted by MRI. Pt has a home health nurse. Treating with Norco. Referred to nephrology. Recent A1c was 6.3, date unknown. Referred to me for neck pain. Cervical spine CT dated 3/16/17 films not available for my review. Per report,No evidence of fracture or acute traumatic subluxation involving the cervical spine. Reversal of the usual cervical lordosis, unchanged from prior radiographs. Multilevel cervical spondylosis, most pronounced C4-C7.  Mild to moderate osseous canal stenosis at C5-C6. Multilevel uncovertebral and facet joint osteoarthritis with neuroforaminal narrowing as above. Enlarged thyroid gland. Correlation with prior ultrasound imaging of the thyroid is recommended. If no prior imaging is available, consideration may be given to a nonemergent thyroid ultrasound. Cervical spine XR dated 8/15/19 reviewed. Per report, Multilevel marked degenerative disc disease mid to lower cervical spine. Thoracic spine XR dated 8/15/19 films not available for my review. Per report,Very subtle scoliosis, as described. Mild spondylitic degenerative change with endplate osteophytes at several levels in the midthoracic spine without other significant bony abnormality. Lumbar spine XR dated 8/15/19 films not available for my review. Per report, No acute bony abnormality is seen. Mild anterior spondylolisthesis L4-5. Possible facet arthropathy at L4-5 and L5-S1. No other significant bony abnormality is seen. The patient is RHD.  reviewed. Body mass index is 28.08 kg/m².     PCP: Betty Vivar MD    Past Medical History:   Diagnosis Date    Allergic rhinitis     Anemia     Asthma     Candida vaginitis     CKD (chronic kidney disease) stage 3, GFR 30-59 ml/min ([facility]     COPD (chronic obstructive pulmonary disease) ([facility]     Diabetes ([address]     Diabetes ([address]     GERD (gastroesophageal reflux disease)     h pylori gastritis egd 11/14 dr Cass Monk    Headache     Hx of colonoscopy 11/05/2014    normal rpt 10 years, 11/2024, dr. Cass Monk    Hypercholesterolemia     Mild dementia ([address]     OAB (overactive bladder)     CHRIS on CPAP     Radiculopathy     Urinary incontinence     UTI (urinary tract infection)           Past Surgical History:   Procedure Laterality Date    HX COLONOSCOPY  11/05/2014    Normal/Repeat in 10 years/Dr. Chaudhary Manual    HX HYSTERECTOMY  1970    HX ORTHOPAEDIC Right 02/2012    knee replacement          Social History     Tobacco Use  Smoking status: Former Smoker     Years: 20.00    Smokeless tobacco: Former User     Quit date: 1/1/1999   Substance Use Topics    Alcohol use: Yes     Work status: The patient is retired. Marital status: The patient is . Current Outpatient Medications   Medication Sig Dispense Refill    buPROPion XL (WELLBUTRIN XL) 150 mg tablet Take 1 Tab by mouth every morning. Indications: Bipolar Depression 30 Tab 0    DULoxetine (CYMBALTA) 30 mg capsule Take 1 Cap by mouth daily. 30 Cap 0    pantoprazole (PROTONIX) 40 mg tablet Take 1 Tab by mouth daily. TAKE 1 TABLET BY MOUTH ONCE DAILY  Indications: gastroesophageal reflux disease 90 Tab 3    montelukast (SINGULAIR) 10 mg tablet Take 1 Tab by mouth daily. Indications: Controller Medication for Asthma 90 Tab 2    atorvastatin (LIPITOR) 10 mg tablet Take 1 Tab by mouth daily. TAKE 1 TABLET BY MOUTH ONCE DAILY  Indications: high cholesterol 90 Tab 3    acetaminophen (TYLENOL) 500 mg capsule Take 500 mg by mouth every six (6) hours as needed for Pain (back).  docusate sodium (COLACE) 50 mg capsule Take 50 mg by mouth daily. Indications: constipation      fluticasone-vilanterol (BREO ELLIPTA) 200-25 mcg/dose inhaler Take 1 Puff by inhalation daily. Indications: Controller Medication for Asthma      fluticasone (VERAMYST) 27.5 mcg/actuation nasal spray [place]  in Nose one spray each nostril daily       ipratropium-albuterol (COMBIVENT RESPIMAT)  mcg/actuation inhaler Take 1 inhalation by mouth Take As Needed daily for asthma      oxybutynin (DITROPAN) 5 mg tablet Take 1 Tab by mouth two (2) times a day. Indications: overactive bladder 180 Tab 0    sertraline (ZOLOFT) 100 mg tablet Take 200 mg by mouth daily. Indications: major depressive disorder      olopatadine (PAZEO) 0.7 % drop Apply 1 Drop to eye daily. both eyes  Indications:  Allergic Conjunctivitis      sodium chloride (OCEAN) 0.65 % nasal squeeze bottle 1 [place] by Both Nostrils route as needed for Congestion.  prednisoLONE acetate (PRED FORTE) 1 % ophthalmic suspension Administer 1 Drop to both eyes four (4) times daily. Indications: Prevent Inflammation of the Cornea due to the Medication Cytarabine      cycloSPORINE (RESTASIS) 0.05 % ophthalmic emulsion Instill 1 Drop in affected eye(s) Every 12 hours. Allergies   Allergen Reactions    Flexeril [Cyclobenzaprine] Other (comments)     Patient states not allergic to this medication.  Iodine Swelling    Nabumetone Swelling    Penicillins Unknown (comments)    Shellfish Containing Products Swelling    Sulfa (Sulfonamide Antibiotics) Swelling    Tramadol Itching          History reviewed. No pertinent family history. REVIEW OF SYSTEMS  Constitutional symptoms: Negative  Eyes: Negative  Ears, Nose, Throat, and Mouth: Negative  Cardiovascular: Negative  Respiratory: Negative  Genitourinary: Negative  Integumentary (Skin and/or breast): Negative  Musculoskeletal: Positive for neck pain  Extremities: Negative for edema. Endocrine/Rheumatologic: Negative  Hematologic/Lymphatic: Negative  Allergic/Immunologic: Negative  Psychiatric: Negative       PHYSICAL EXAMINATION  Visit Vitals  /78 (BP 1 Location: Left arm, BP Patient Position: Sitting)   Pulse (!) 58   Temp 98.2 °F (36.8 °C) (Oral)   Resp 16   Ht 5' 1\" (1.549 m)   Wt 148 lb 9.6 oz (67.4 kg)   SpO2 100%   BMI 28.08 kg/m²       CONSTITUTIONAL: NAD, A&O x 3  HEART: Regular rate and rhythm  ABDOMEN: Positive bowel sounds, soft, nontender, and nondistended  LUNGS: Clear to auscultation bilaterally. SKIN: Negative for rash. RANGE OF MOTION: The patient has full passive range of motion in all four extremities. SENSATION: Decreased sensation to light touch L4 RLE. Sensation to light touch otherwise intact.    MOTOR:   Straight Leg Raise: Negative, bilateral  Tolbert: Negative, bilateral  Tandem Gait: deferred due to chronic falls and hx of right foot drop  Deep tendon reflexes are 2+ at the brachioradialis, biceps, and triceps. Deep tendon reflexes are 2+ at the knees and trace ankles bilaterally. Shoulder AB/Flex Elbow Flex Wrist Ext Elbow Ext Wrist Flex Hand Intrin Tone   Right +4/5 +4/5 +4/5 +4/5 +4/5 +4/5 +4/5   Left +4/5 +4/5 +4/5 +4/5 +4/5 +4/5 +4/5              Hip Flex Knee Ext Knee Flex Ankle DF GTE Ankle PF Tone   Right +4/5 +4/5 +4/5 3/5, chronic since knee replacement in 2012 +4/5 +4/5 +4/5   Left +4/5 +4/5 +4/5 +4/5 +4/5 +4/5 +4/5     Ambulates with a single point cane. ASSESSMENT   Diagnoses and all orders for this visit:    1. Cervical spinal stenosis    2. Cervical neuritis    3. DDD (degenerative disc disease), cervical           IMPRESSIONS/RECOMMENDATIONS:  Patient presents today with c/o  neck pain following fall at work in [address] [place] [address]. Patient really has diffuse, widespread pain complaints. It appears she has been experiencing less falls after adjusting her medications. She has known neuropathy and right foot drop which can be contributing to her fall. CT scan from 2 years ago indicated she had spinal stenosis which may also be contributing to her neck pain and falls. Pt has a brace and walker which appears she has not been using. I will set her up for an MRI of the cervical spine. I advised patient to bring copies of films to next visit. I will have the patient sign a release of medical information to obtain noted from Dr. Kera Hinds. Multiple treatment options were discussed. I will see the patient back following MRI. Written by Richard Murray, as dictated by Antonia Velasquez MD  I examined the patient, reviewed and agree with the note.

## 2019-11-01 NOTE — LETTER
11/1/19 Patient: Ling Palafox YOB: 1950 Date of Visit: 11/1/2019 Keven Prieto, [address] [id number] [place] [address] VIA In Basket Dear Keven Prieto MD, Thank you for referring Ms. Carla Gilbert to [address] for evaluation. My notes for this consultation are attached. If you have questions, please do not hesitate to call me. I look forward to following your patient along with you. Sincerely, Salome Rivera MD

## 2019-11-01 NOTE — PROGRESS NOTES
MRI Cervical Spine without contrast is scheduled at [facility], [id number], on 11/17/19, arrive 5:15Pm, test 5:45PM. No Medicare pre-authorization required.

## 2019-11-04 RX ORDER — BUPROPION HYDROCHLORIDE 150 MG/1
150 TABLET ORAL
OUTPATIENT
Start: 2019-11-04

## 2019-11-22 ENCOUNTER — TELEPHONE (OUTPATIENT)
Dept: FAMILY MEDICINE CLINIC | Age: 69
End: 2019-11-22

## 2019-11-22 NOTE — TELEPHONE ENCOUNTER
Pt called and would like to discuss her  imagines that she stated she spoke to dr Brady Cross nurse about. Pt sound confused when I asked her to repeat herself she stated Adrienne Sender knows what I am talking about. Please call pt at your earliest convenience.

## 2019-11-25 ENCOUNTER — OFFICE VISIT (OUTPATIENT)
Dept: ORTHOPEDIC SURGERY | Age: 69
End: 2019-11-25

## 2019-11-25 VITALS
DIASTOLIC BLOOD PRESSURE: 64 MMHG | TEMPERATURE: 98.1 F | BODY MASS INDEX: 27.85 KG/M2 | SYSTOLIC BLOOD PRESSURE: 136 MMHG | WEIGHT: 147.4 LBS | HEART RATE: 66 BPM | OXYGEN SATURATION: 100 %

## 2019-11-25 DIAGNOSIS — M50.30 DDD (DEGENERATIVE DISC DISEASE), CERVICAL: ICD-10-CM

## 2019-11-25 DIAGNOSIS — M48.02 CERVICAL SPINAL STENOSIS: Primary | ICD-10-CM

## 2019-11-25 DIAGNOSIS — M54.12 CERVICAL NEURITIS: ICD-10-CM

## 2019-11-25 RX ORDER — AMMONIUM LACTATE 12 G/100G
CREAM TOPICAL
Refills: 4 | COMMUNITY
Start: 2019-09-11 | End: 2019-11-26

## 2019-11-25 RX ORDER — GABAPENTIN 100 MG/1
100 CAPSULE ORAL 3 TIMES DAILY
Qty: 90 CAP | Refills: 1 | Status: SHIPPED | OUTPATIENT
Start: 2019-11-25 | End: 2020-04-21 | Stop reason: SDUPTHER

## 2019-11-25 NOTE — LETTER
11/25/19 Patient: Shree Benjamin YOB: 1950 Date of Visit: 11/25/2019 Erna Perez, [address] [id number] [place] [address] VIA In Basket Dear Erna Perez MD, Thank you for referring Ms. Maciel Darling to [address] for evaluation. My notes for this consultation are attached. If you have questions, please do not hesitate to call me. I look forward to following your patient along with you. Sincerely, Melissa Tamez MD

## 2019-11-25 NOTE — PROGRESS NOTES
[facility]  [address], [address]   Phone: [phone]  Fax: [phone]        PROGRESS NOTE      HISTORY OF PRESENT ILLNESS:  The patient is a 68 y.o. female and was seen today for follow up of neck pain following fall at work in [address] [place] [address]. She reports progressive, constant falls, at least 1 fall per day x 1 year. However she hasn't had a fall since July 2019. Pt has diffuse, widespread, pain complaint. She denies h/o spinal surgery. Pt reports improvement due to PT. Pt recalls spinal injections through pain management years ago. Pt reports she was receiving trigger injections through Dr. Wilfrido De Souza. She reports Dr. Wilfrido De Souza discharged her two months ago due to improvement in pain and falls. Treated with Norco and Flexeril with benefit. Allergic to Flexeril, Nabumetone, Tramadol, and contrast. Pt is not a candidate for CYMBALTA secondary to Wellbutrin. Pt is a poor historian. Pt denies fever, weight loss, or skin changes. The patient is RHD. PmHx overactive bladder, DM, anemia, stage 3 CKD (Dr. Sulma Anthony), bipolar disorder, right knee replacement (2012), neuropathy. Note from David Dhaliwal MD dated 8/15/19 indicating patient was seen with c/o multiple falls. Feels dizzy at times with unsteady gait. Week prior had fallen backwards. Worsening pain in the entire back. Chronic x several years. She additionally has a right foot drop and uses canes for ambulation. She has had a neuro workup. Chronic ischemic changes noted by MRI. Pt has a home health nurse. Treating with Norco. Referred to nephrology. Recent A1c was 6.3, date unknown. Referred to me for neck pain. Cervical spine CT dated 3/16/17 films not available for my review. Per report,No evidence of fracture or acute traumatic subluxation involving the cervical spine. Reversal of the usual cervical lordosis, unchanged from prior radiographs. Multilevel cervical spondylosis, most pronounced C4-C7.  Mild to moderate osseous canal stenosis at C5-C6. Multilevel uncovertebral and facet joint osteoarthritis with neuroforaminal narrowing as above. Enlarged thyroid gland. Correlation with prior ultrasound imaging of the thyroid is recommended. If no prior imaging is available, consideration may be given to a nonemergent thyroid ultrasound. Cervical spine XR dated 8/15/19 reviewed. Per report, Multilevel marked degenerative disc disease mid to lower cervical spine. Thoracic spine XR dated 8/15/19 films not available for my review. Per report,Very subtle scoliosis, as described. Mild spondylitic degenerative change with endplate osteophytes at several levels in the midthoracic spine without other significant bony abnormality. Lumbar spine XR dated 8/15/19 films not available for my review. Per report, No acute bony abnormality is seen. Mild anterior spondylolisthesis L4-5. Possible facet arthropathy at L4-5 and L5-S1. No other significant bony abnormality is seen. At her last clinical appointment, patient presented with c/o neck pain following fall at work in [address] [place] [address]. Patient really had diffuse, widespread pain complaints. It appears as she had been experiencing less falls after adjusting her medications. She had known neuropathy and right foot drop which can be contributing to her falls. CT scan from 2 years ago indicated she had spinal stenosis which may have also been contributing to her neck pain and falls. Pt had a brace and walker which appears she had not been using. I set her up for an MRI of the cervical spine. I had the patient sign a release of medical information to obtain notes from Dr. Cristhian Boudreaux       The patient returns today with pain location and distribution remain unchanged. She rates her pain 6/10, previously 8/10. Pt reports her pain is off and on. Pt is not performing her HEP. Pt denies dropping things or loss of balance. Cervical spine MRI dated 11/19/19 reviewed.  Per report, Overall straightening of the normal cervical lordosis with gradual C4-C6 kyphosis and multilevel degenerative disc disease. Associated cervical cord impingement, moderate at C4-C5, moderate at C5-C6 and minimal at C6-C7. Facet arthropathy and uncovertebral proliferative changes cause several high-grade foraminal stenoses - severe on the right at C4-C5, moderately severe on the right and severe on the left at C5-C6 and C6-C7.  reviewed. Body mass index is 27.85 kg/m². PCP: Aisha Cantu MD      Past Medical History:   Diagnosis Date    Allergic rhinitis     Anemia     Asthma     Candida vaginitis     CKD (chronic kidney disease) stage 3, GFR 30-59 ml/min (HCC)     COPD (chronic obstructive pulmonary disease) (HCC)     Diabetes (HCC)     Diabetes [facility]     GERD (gastroesophageal reflux disease)     h pylori gastritis egd 11/14 dr Emily Kat    Headache     Hx of colonoscopy 11/05/2014    normal rpt 10 years, 11/2024, dr. Chico Gomez Hypercholesterolemia     [facility]     OAB (overactive bladder)     CHRIS on CPAP     Radiculopathy     Urinary incontinence     UTI (urinary tract infection)         Social History     Socioeconomic History    Marital status:      Spouse name: Not on file    Number of children: Not on file    Years of education: Not on file    Highest education level: Not on file   Occupational History    Not on file   Social Needs    Financial resource strain: Not on file    Food insecurity:     Worry: Not on file     Inability: Not on file    Transportation needs:     Medical: Not on file     Non-medical: Not on file   Tobacco Use    Smoking status: Former Smoker     Years: 20.00    Smokeless tobacco: Former User     Quit date: 1/1/1999   Substance and Sexual Activity    Alcohol use:  Yes    Drug use: No    Sexual activity: Not Currently     Partners: Male   Lifestyle    Physical activity:     Days per week: Not on file     Minutes per session: Not on file    Stress: Not on file Relationships    Social connections:     Talks on phone: Not on file     Gets together: Not on file     Attends [employer] service: Not on file     Active member of club or organization: Not on file     Attends meetings of clubs or organizations: Not on file     Relationship status: Not on file    Intimate partner violence:     Fear of current or ex partner: Not on file     Emotionally abused: Not on file     Physically abused: Not on file     Forced sexual activity: Not on file   Other Topics Concern    Not on file   Social History Narrative    Not on file       Current Outpatient Medications   Medication Sig Dispense Refill    ammonium lactate (AMLACTIN) 12 % topical cream APPLY CREAM TOPICALLY TWICE DAILY ON THE HANDS AND FEED AS NEEDED  4    buPROPion XL (WELLBUTRIN XL) 150 mg tablet Take 1 Tab by mouth every morning. Indications: Bipolar Depression 30 Tab 0    sertraline (ZOLOFT) 100 mg tablet Take 200 mg by mouth daily. Indications: major depressive disorder      DULoxetine (CYMBALTA) 30 mg capsule Take 1 Cap by mouth daily. 30 Cap 0    pantoprazole (PROTONIX) 40 mg tablet Take 1 Tab by mouth daily. TAKE 1 TABLET BY MOUTH ONCE DAILY  Indications: gastroesophageal reflux disease 90 Tab 3    montelukast (SINGULAIR) 10 mg tablet Take 1 Tab by mouth daily. Indications: Controller Medication for Asthma 90 Tab 2    atorvastatin (LIPITOR) 10 mg tablet Take 1 Tab by mouth daily. TAKE 1 TABLET BY MOUTH ONCE DAILY  Indications: high cholesterol 90 Tab 3    acetaminophen (TYLENOL) 500 mg capsule Take 500 mg by mouth every six (6) hours as needed for Pain (back).  olopatadine (PAZEO) 0.7 % drop Apply 1 Drop to eye daily. both eyes  Indications: Allergic Conjunctivitis      sodium chloride (OCEAN) 0.65 % nasal squeeze bottle 1 [place] by Both Nostrils route as needed for Congestion.  fluticasone-vilanterol (BREO ELLIPTA) 200-25 mcg/dose inhaler Take 1 Puff by inhalation daily.  Indications: Controller Medication for Asthma      fluticasone (VERAMYST) 27.5 mcg/actuation nasal spray [place]  in Nose one spray each nostril daily       oxybutynin (DITROPAN) 5 mg tablet Take 1 Tab by mouth two (2) times a day. Indications: overactive bladder 180 Tab 0    docusate sodium (COLACE) 50 mg capsule Take 50 mg by mouth daily. Indications: constipation      prednisoLONE acetate (PRED FORTE) 1 % ophthalmic suspension Administer 1 Drop to both eyes four (4) times daily. Indications: Prevent Inflammation of the Cornea due to the Medication Cytarabine      cycloSPORINE (RESTASIS) 0.05 % ophthalmic emulsion Instill 1 Drop in affected eye(s) Every 12 hours.  ipratropium-albuterol (COMBIVENT RESPIMAT)  mcg/actuation inhaler Take 1 inhalation by mouth Take As Needed daily for asthma         Allergies   Allergen Reactions    Flexeril [Cyclobenzaprine] Other (comments)     Patient states not allergic to this medication.  Iodine Swelling    Nabumetone Swelling    Penicillins Unknown (comments)    Shellfish Containing Products Swelling    Sulfa (Sulfonamide Antibiotics) Swelling    Tramadol Itching          PHYSICAL EXAMINATION    Visit Vitals  /64 (BP 1 Location: Left arm, BP Patient Position: Sitting)   Pulse 66   Temp 98.1 °F (36.7 °C)   Wt 147 lb 6.4 oz (66.9 kg)   SpO2 100%   BMI 27.85 kg/m²       CONSTITUTIONAL: NAD, A&O x 3  SENSATION: Intact to light touch throughout  NEURO: Opal's is negative bilaterally. RANGE OF MOTION: The patient has full passive range of motion in all four extremities. Shoulder AB/Flex Elbow Flex Wrist Ext Elbow Ext Wrist Flex Hand Intrin Tone   Right +4/5 +4/5 +4/5 +4/5 +4/5 +4/5 +4/5   Left +4/5 +4/5 +4/5 +4/5 +4/5 +4/5 +4/5               Ambulates with a rolling walker. ASSESSMENT   Diagnoses and all orders for this visit:    1. Cervical spinal stenosis    2. Cervical neuritis    3.  DDD (degenerative disc disease), cervical          IMPRESSION AND PLAN:  I will try her on Neurontin 100 mg TID. The risks, benefits, and potential side effects of this medication were discussed. Patient understands and wishes to proceed. Patient advised to call the office if intolerant to new medication. I will check with Dr. Lia Carrion to determine maximum safe dose of Neurontin due to hx of stage 3 CKD. I will refer her to physical therapy with an emphasis on HEP. Patient is neurologically intact. I will see the patient back in 1 month's time or earlier if needed. Written by Dianna Tomas, as dictated by Cheo Handley MD  I examined the patient, reviewed and agree with the note.

## 2019-11-26 ENCOUNTER — OFFICE VISIT (OUTPATIENT)
Dept: FAMILY MEDICINE CLINIC | Age: 69
End: 2019-11-26

## 2019-11-26 VITALS
DIASTOLIC BLOOD PRESSURE: 78 MMHG | RESPIRATION RATE: 16 BRPM | WEIGHT: 147 LBS | BODY MASS INDEX: 27.75 KG/M2 | HEIGHT: 61 IN | TEMPERATURE: 98.4 F | HEART RATE: 74 BPM | SYSTOLIC BLOOD PRESSURE: 110 MMHG | OXYGEN SATURATION: 99 %

## 2019-11-26 DIAGNOSIS — J42 CHRONIC BRONCHITIS, UNSPECIFIED CHRONIC BRONCHITIS TYPE (HCC): ICD-10-CM

## 2019-11-26 DIAGNOSIS — I67.89 CEREBRAL MICROVASCULAR DISEASE: ICD-10-CM

## 2019-11-26 DIAGNOSIS — E78.00 PURE HYPERCHOLESTEROLEMIA: ICD-10-CM

## 2019-11-26 DIAGNOSIS — R29.6 FREQUENT FALLS: ICD-10-CM

## 2019-11-26 DIAGNOSIS — D63.8 ANEMIA OF CHRONIC DISEASE: ICD-10-CM

## 2019-11-26 DIAGNOSIS — Z23 ENCOUNTER FOR IMMUNIZATION: ICD-10-CM

## 2019-11-26 DIAGNOSIS — E11.42 TYPE 2 DIABETES MELLITUS WITH DIABETIC POLYNEUROPATHY, WITHOUT LONG-TERM CURRENT USE OF INSULIN (HCC): ICD-10-CM

## 2019-11-26 DIAGNOSIS — E11.21 TYPE 2 DIABETES WITH NEPHROPATHY (HCC): Primary | ICD-10-CM

## 2019-11-26 DIAGNOSIS — J30.9 CHRONIC ALLERGIC RHINITIS: ICD-10-CM

## 2019-11-26 DIAGNOSIS — K21.9 GASTROESOPHAGEAL REFLUX DISEASE, ESOPHAGITIS PRESENCE NOT SPECIFIED: ICD-10-CM

## 2019-11-26 DIAGNOSIS — N32.81 OVERACTIVE BLADDER: ICD-10-CM

## 2019-11-26 DIAGNOSIS — N18.30 CKD (CHRONIC KIDNEY DISEASE) STAGE 3, GFR 30-59 ML/MIN (HCC): ICD-10-CM

## 2019-11-26 DIAGNOSIS — F31.9 BIPOLAR AFFECTIVE DISORDER, REMISSION STATUS UNSPECIFIED (HCC): ICD-10-CM

## 2019-11-26 DIAGNOSIS — F20.9 SCHIZOPHRENIA, UNSPECIFIED TYPE (HCC): ICD-10-CM

## 2019-11-26 RX ORDER — PANTOPRAZOLE SODIUM 40 MG/1
40 TABLET, DELAYED RELEASE ORAL DAILY
Qty: 90 TAB | Refills: 3 | Status: SHIPPED | OUTPATIENT
Start: 2019-11-26 | End: 2020-05-19 | Stop reason: SDUPTHER

## 2019-11-26 RX ORDER — MONTELUKAST SODIUM 10 MG/1
10 TABLET ORAL DAILY
Qty: 90 TAB | Refills: 3 | Status: SHIPPED | OUTPATIENT
Start: 2019-11-26 | End: 2020-05-19 | Stop reason: SDUPTHER

## 2019-11-26 RX ORDER — PREDNISOLONE ACETATE 10 MG/ML
1 SUSPENSION/ DROPS OPHTHALMIC 4 TIMES DAILY
Status: CANCELLED | OUTPATIENT
Start: 2019-11-26

## 2019-11-26 RX ORDER — ATORVASTATIN CALCIUM 10 MG/1
10 TABLET, FILM COATED ORAL DAILY
Qty: 90 TAB | Refills: 3 | Status: SHIPPED | OUTPATIENT
Start: 2019-11-26 | End: 2020-05-19 | Stop reason: SDUPTHER

## 2019-11-26 RX ORDER — OXYBUTYNIN CHLORIDE 5 MG/1
5 TABLET ORAL 2 TIMES DAILY
Qty: 180 TAB | Refills: 3 | Status: SHIPPED | OUTPATIENT
Start: 2019-11-26 | End: 2020-05-19 | Stop reason: SDUPTHER

## 2019-11-26 RX ORDER — BUPROPION HYDROCHLORIDE 150 MG/1
150 TABLET ORAL
Qty: 30 TAB | Refills: 0 | Status: CANCELLED | OUTPATIENT
Start: 2019-11-26

## 2019-11-26 RX ORDER — CYCLOSPORINE 0.5 MG/ML
EMULSION OPHTHALMIC
Status: CANCELLED | OUTPATIENT
Start: 2019-11-26

## 2019-11-26 NOTE — PROGRESS NOTES
Laurie Guerrero, 69 y.o.,  female    SUBJECTIVE  Ff-up     Requesting podiatry referral. She had DM w/ nephropathy, diet controlled and complaining of calluses on foot. NIDDM w/ nephropathy/ neuropathy is about the same. Says currently off of januvia. Reviewed a1c 6.9     She has chronic anemia thought to be due to anemia of chronic disease. She reports GI work up 2014 normal. She is no longer seeing [facility], and her neprhologist Dr. Thanh Brewer has taken over monitoring this. HL- taking lipitor    OAB- taking oxybutynin and responding well to this. GERD- asymptomatic, on protonix. Chronic neck pain with radiculopathy-she is now seeing dr. Sury Delvalle, and started her on trial gabapentin. Asthma-doing well she says, on ICS and LAMA for maintenance,  following Dr. Ekta Perry    CHRIS on CPAP- reports consistent use,  following  Dr. Andrea Arango. Reports spontaneous resolution of headaches    Eri Chiu ([phone]) and grand daughter Chhaya Bui ([phone]). Are assisting with care, there is talk to have her move in with them    Provided's currently:  COPD/CHRIS on CPAP- pulm Dr. Ekta Perry  Memory/ dizzinessNeurology- NP bibealt/ Cardiology- Dr. Baldev Hackett  Depression/bipolar/ schizophrenia- CPA Janine mingisuzi  Anemia of chronic disease/ CKD- dr. Alba Carter   Vertigo- ENT Dr. Jenna Meyer - upcoming vertigo PT  Cervical DDD- JOLENE        ROS:  See HPI, all others negative    ROS random episodes of chills, no wt loss/appetite changes, no hot flashes/night sweates/ fever    Patient Active Problem List   Diagnosis Code    Pure hypercholesterolemia E78.00    Overactive bladder N32.81    Chronic allergic rhinitis J30.9    Anemia M42.2    Uncomplicated asthma E54.112    Type 2 diabetes mellitus without complication, without long-term current use of insulin ([facility] E11.9         Current Outpatient Medications:     oxybutynin (DITROPAN) 5 mg tablet, Take 1 Tab by mouth two (2) times a day.  Indications: overactive bladder, Disp: 180 Tab, Rfl: 3    pantoprazole (PROTONIX) 40 mg tablet, Take 1 Tab by mouth daily. TAKE 1 TABLET BY MOUTH ONCE DAILY  Indications: gastroesophageal reflux disease, Disp: 90 Tab, Rfl: 3    atorvastatin (LIPITOR) 10 mg tablet, Take 1 Tab by mouth daily. TAKE 1 TABLET BY MOUTH ONCE DAILY  Indications: high cholesterol, Disp: 90 Tab, Rfl: 3    montelukast (SINGULAIR) 10 mg tablet, Take 1 Tab by mouth daily. Indications: controller medication for asthma, Disp: 90 Tab, Rfl: 3    gabapentin (NEURONTIN) 100 mg capsule, Take 1 Cap by mouth three (3) times daily. Max Daily Amount: 300 mg., Disp: 90 Cap, Rfl: 1    buPROPion XL (WELLBUTRIN XL) 150 mg tablet, Take 1 Tab by mouth every morning. Indications: Bipolar Depression, Disp: 30 Tab, Rfl: 0    sertraline (ZOLOFT) 100 mg tablet, Take 200 mg by mouth daily. Indications: major depressive disorder, Disp: , Rfl:     sodium chloride (OCEAN) 0.65 % nasal squeeze bottle, 1 [place] by Both Nostrils route as needed for Congestion. , Disp: , Rfl:     fluticasone-vilanterol (BREO ELLIPTA) 200-25 mcg/dose inhaler, Take 1 Puff by inhalation daily. Indications: Controller Medication for Asthma, Disp: , Rfl:     fluticasone (VERAMYST) 27.5 mcg/actuation nasal spray, Spray  in Nose one spray each nostril daily , Disp: , Rfl:     ipratropium-albuterol (COMBIVENT RESPIMAT)  mcg/actuation inhaler, Take 1 inhalation by mouth Take As Needed daily for asthma, Disp: , Rfl:     acetaminophen (TYLENOL) 500 mg capsule, Take 500 mg by mouth every six (6) hours as needed for Pain (back). , Disp: , Rfl:     olopatadine (PAZEO) 0.7 % drop, Apply 1 Drop to eye daily. both eyes  Indications: Allergic Conjunctivitis, Disp: , Rfl:     prednisoLONE acetate (PRED FORTE) 1 % ophthalmic suspension, Administer 1 Drop to both eyes four (4) times daily.  Indications: Prevent Inflammation of the Cornea due to the Medication Cytarabine, Disp: , Rfl:     cycloSPORINE (RESTASIS) 0.05 % ophthalmic emulsion, Instill 1 Drop in affected eye(s) Every 12 hours. , Disp: , Rfl:     Allergies   Allergen Reactions    Iodine Swelling    Nabumetone Swelling    Penicillins Unknown (comments)    Shellfish Containing Products Swelling    Sulfa (Sulfonamide Antibiotics) Swelling       Past Medical History:   Diagnosis Date    Allergic rhinitis     Anemia     Asthma     Candida vaginitis     CKD (chronic kidney disease) stage 3, GFR 30-59 ml/min     COPD (chronic obstructive pulmonary disease) (HCC)     Diabetes (HCC)     Diabetes (HCC)     GERD (gastroesophageal reflux disease)     h pylori gastritis egd 11/14 dr Sydni Riley    Headache     Hx of colonoscopy 11/05/2014    normal rpt 10 years, 11/2024, dr. Sydni Riley    Hypercholesterolemia     Mild dementia     OAB (overactive bladder)     CHRIS on CPAP     Radiculopathy     Urinary incontinence     UTI (urinary tract infection)        Social History     Social History    Marital status:      Spouse name: N/A    Number of children: N/A    Years of education: N/A     Occupational History    Not on file. Social History Main Topics    Smoking status: Former Smoker     Years: 20.00    Smokeless tobacco: Former User     Quit date: 1/1/1999    Alcohol use Yes    Drug use: No    Sexual activity: Not Currently     Partners: Male     Other Topics Concern    Not on file     Social History Narrative       History reviewed. No pertinent family history.       OBJECTIVE    Physical Exam:     Visit Vitals  /78 (BP 1 Location: Left arm, BP Patient Position: Sitting)   Pulse 74   Temp 98.4 °F (36.9 °C) (Oral)   Resp 16   Ht 5' 1\" (1.549 m)   Wt 147 lb (66.7 kg)   SpO2 99%   BMI 27.78 kg/m²       General: alert, AA, in no apparent distress or pain  CVS: normal rate, regular rhythm, distinct S1 and S2  Lungs:clear to ausculation bilaterally, no crackles, wheezing or rhonchi noted  Extremities: No edema, no cyanosis  Psych:  mood and affect normal    ASSESSMENT/PLAN  Diagnoses and all orders for this visit:   Type 2 diabetes mellitus with neuropathy(HCC)  Diet Controlled, off januvia  a1c 6.3>6.9  On gabapentin  Referral to podiatry  Check a1c, cmp, lipid panel, microalbumin prior to next visit in 3 months    Type 2 diabetes with nephropathy ([facility]    Schizophrenia, unspecified type ([facility]  Advised to inform her psychiatrist CPA of these diagnosis and to have daughter/granddaughter join her during visits for improved communication/care    Bipolar affective disorder, remission status unspecified ([facility]    Anemia of chronic disease  Stable  hgb baseline 10's  Consideration of aransep  Now following Dr. Rosalind Marquez nephrology  Monitoring, cbc prior to next visit    CKD 3  Avoid nsaids, renally dose medications  Monitoring    Frequent falls  No recent falls. Likely multifactorial: peripheral/DM neuropathy  Her neuro/cards eval was essentially neg. Mild carotid stenosis, chronic cerebral ischemic changes  Advised consistent walker use, fall precautions discussed  Upcoming PT for vertigo    Bilateral carotid stenosis  Mild  Optimize med management  On statin, asa  Monitoring    Cerebral microvascular disease  Optimize metabolic syndrome mgt     Pure hypercholesterolemia  On lipitor  LDL goal <70  Recheck labs prior to next visit     Overactive bladder  Responding to ditropan, to cont    Chronic bronchitis, unspecified chronic bronchitis type ([facility]  Controlled  On ICS, prn WILLARD/ipatropium  Following pulm dr. Marco Pollard    GERD, unspecified esophagitis  Stable  Cont protonix    Encounter for immunization  High dose flu vaccine given    Ff-up in 3 months, sooner prn    Patient understands plan of care. Patient has provided input and agrees with goals.

## 2019-11-26 NOTE — PROGRESS NOTES
1. Have you been to the ER, urgent care clinic since your last visit? Hospitalized since your last visit? No    2. Have you seen or consulted any other health care providers outside of the [address] [place] [address] since your last visit? Include any pap smears or colon screening. Dr. Okeefe Peers Dr. Nan Baldwin 0.5 ml given IM in left deltoid. Lot # [id number], exp date 06/30/2020. Patient tolerated injection well. No adverse reaction noted.

## 2019-11-26 NOTE — PATIENT INSTRUCTIONS
Vaccine Information Statement    Influenza (Flu) Vaccine (Inactivated or Recombinant): What You Need to Know    Many Vaccine Information Statements are available in Latvian and other languages. See www.immunize.org/vis  Hojas de información sobre vacunas están disponibles en español y en muchos otros idiomas. Visite www.immunize.org/vis    1. Why get vaccinated? Influenza vaccine can prevent influenza (flu). Flu is a contagious disease that spreads around the [facility] every year, usually between October and May. Anyone can get the flu, but it is more dangerous for some people. Infants and young children, people 72years of age and older, pregnant women, and people with certain health conditions or a weakened immune system are at greatest risk of flu complications. Pneumonia, bronchitis, sinus infections and ear infections are examples of flu-related complications. If you have a medical condition, such as heart disease, cancer or diabetes, flu can make it worse. Flu can cause fever and chills, sore throat, muscle aches, fatigue, cough, headache, and runny or stuffy nose. Some people may have vomiting and diarrhea, though this is more common in children than adults. Each year thousands of people in the [facility] die from flu, and many more are hospitalized. Flu vaccine prevents millions of illnesses and flu-related visits to the doctor each year. 2. Influenza vaccines     CDC recommends everyone 10months of age and older get vaccinated every flu season. Children 6 months through 6years of age may need 2 doses during a single flu season. Everyone else needs only 1 dose each flu season. It takes about 2 weeks for protection to develop after vaccination. There are many flu viruses, and they are always changing. Each year a new flu vaccine is made to protect against three or four viruses that are likely to cause disease in the upcoming flu season.  Even when the vaccine doesnt exactly match these viruses, it may still provide some protection. Influenza vaccine does not cause flu. Influenza vaccine may be given at the same time as other vaccines. 3. Talk with your health care provider    Tell your vaccine provider if the person getting the vaccine:   Has had an allergic reaction after a previous dose of influenza vaccine, or has any severe, life-threatening allergies.  Has ever had Guillain-Barré Syndrome (also called GBS). In some cases, your health care provider may decide to postpone influenza vaccination to a future visit. People with minor illnesses, such as a cold, may be vaccinated. People who are moderately or severely ill should usually wait until they recover before getting influenza vaccine. Your health care provider can give you more information. 4. Risks of a reaction     Soreness, redness, and swelling where shot is given, fever, muscle aches, and headache can happen after influenza vaccine.  There may be a very small increased risk of Guillain-Barré Syndrome (GBS) after inactivated influenza vaccine (the flu shot). Irl Pae children who get the flu shot along with pneumococcal vaccine (PCV13), and/or DTaP vaccine at the same time might be slightly more likely to have a seizure caused by fever. Tell your health care provider if a child who is getting flu vaccine has ever had a seizure. People sometimes faint after medical procedures, including vaccination. Tell your provider if you feel dizzy or have vision changes or ringing in the ears. As with any medicine, there is a very remote chance of a vaccine causing a severe allergic reaction, other serious injury, or death. 5. What if there is a serious problem? An allergic reaction could occur after the vaccinated person leaves the clinic.  If you see signs of a severe allergic reaction (hives, swelling of the face and throat, difficulty breathing, a fast heartbeat, dizziness, or weakness), call 9-1-1 and get the person to the nearest hospital.    For other signs that concern you, call your health care provider. Adverse reactions should be reported to the Vaccine Adverse Event Reporting System (VAERS). Your health care provider will usually file this report, or you can do it yourself. Visit the VAERS website at www.vaers. [facility].gov or call 9-751.802.8852. VAERS is only for reporting reactions, and VAERS staff do not give medical advice. 6. The National Vaccine Injury Compensation Program    The [facility] Vaccine Injury Compensation Program (VICP) is a federal program that was created to compensate people who may have been injured by certain vaccines. Visit the VICP website at www.[facility].gov/vaccinecompensation or call 7-131.970.1452 to learn about the program and about filing a claim. There is a time limit to file a claim for compensation. 7. How can I learn more?  Ask your health care provider.  Call your local or state health department.  Contact the Centers for Disease Control and Prevention (CDC):  - Call 8-847.667.5292 (1-800-CDC-INFO) or  - Visit CDCs influenza website at www.cdc.gov/flu    Vaccine Information Statement (Interim)  Inactivated Influenza Vaccine   8/15/2019  [address] Kaye Brochure [id number]-[id number]   Department of Health and Human Services  Centers for Disease Control and Prevention    Office Use Only

## 2019-11-27 DIAGNOSIS — M54.12 CERVICAL NEURITIS: ICD-10-CM

## 2019-11-27 DIAGNOSIS — M48.02 CERVICAL SPINAL STENOSIS: Primary | ICD-10-CM

## 2019-11-27 DIAGNOSIS — M50.30 DDD (DEGENERATIVE DISC DISEASE), CERVICAL: ICD-10-CM

## 2019-11-27 NOTE — TELEPHONE ENCOUNTER
Dr. Hari Orellana would like to know if it is ok for the patient to take Neurontin 100 TID. Thank you.

## 2019-11-29 PROBLEM — E11.42 TYPE 2 DIABETES MELLITUS WITH DIABETIC POLYNEUROPATHY, WITHOUT LONG-TERM CURRENT USE OF INSULIN (HCC): Status: ACTIVE | Noted: 2019-05-13

## 2019-11-29 PROBLEM — K21.9 GASTROESOPHAGEAL REFLUX DISEASE: Status: ACTIVE | Noted: 2019-11-29

## 2019-12-03 ENCOUNTER — TELEPHONE (OUTPATIENT)
Dept: ORTHOPEDIC SURGERY | Age: 69
End: 2019-12-03

## 2019-12-03 NOTE — TELEPHONE ENCOUNTER
Patient is requesting a call back to review dosing instructions for Gabapentin 300 mg - she says she forgot the instructions. Please advise patient at [phone].

## 2019-12-05 ENCOUNTER — HOSPITAL ENCOUNTER (OUTPATIENT)
Dept: LAB | Age: 69
Discharge: HOME | End: 2019-12-05
Payer: MEDICARE

## 2019-12-05 LAB
ALBUMIN SERPL-MCNC: 3.5 G/DL (ref 3.4–5)
ANION GAP SERPL CALC-SCNC: 7 MMOL/L (ref 3–18)
APPEARANCE UR: CLEAR
BASOPHILS # BLD: 0 K/UL (ref 0–0.1)
BASOPHILS NFR BLD: 0 % (ref 0–2)
BILIRUB UR QL: NEGATIVE
BUN SERPL-MCNC: 19 MG/DL (ref 7–18)
BUN/CREAT SERPL: 10 (ref 12–20)
CALCIUM SERPL-MCNC: 9 MG/DL (ref 8.5–10.1)
CALCIUM SERPL-MCNC: 9 MG/DL (ref 8.5–10.1)
CHLORIDE SERPL-SCNC: 111 MMOL/L (ref 100–111)
CO2 SERPL-SCNC: 28 MMOL/L (ref 21–32)
COLOR UR: YELLOW
CREAT SERPL-MCNC: 1.85 MG/DL (ref 0.6–1.3)
CREAT UR-MCNC: 115 MG/DL (ref 30–125)
DIFFERENTIAL METHOD BLD: ABNORMAL
EOSINOPHIL # BLD: 0.2 K/UL (ref 0–0.4)
EOSINOPHIL NFR BLD: 4 % (ref 0–5)
EPITH CASTS URNS QL MICRO: NORMAL /LPF (ref 0–5)
ERYTHROCYTE [DISTWIDTH] IN BLOOD BY AUTOMATED COUNT: 15.8 % (ref 11.6–14.5)
GLUCOSE SERPL-MCNC: 202 MG/DL (ref 74–99)
GLUCOSE UR STRIP.AUTO-MCNC: NEGATIVE MG/DL
HCT VFR BLD AUTO: 29.6 % (ref 35–45)
HGB BLD-MCNC: 9.7 G/DL (ref 12–16)
HGB UR QL STRIP: NEGATIVE
IRON SATN MFR SERPL: 16 %
IRON SERPL-MCNC: 42 UG/DL (ref 50–175)
KETONES UR QL STRIP.AUTO: NEGATIVE MG/DL
LEUKOCYTE ESTERASE UR QL STRIP.AUTO: NEGATIVE
LYMPHOCYTES # BLD: 1.8 K/UL (ref 0.9–3.6)
LYMPHOCYTES NFR BLD: 45 % (ref 21–52)
MCH RBC QN AUTO: 26.5 PG (ref 24–34)
MCHC RBC AUTO-ENTMCNC: 32.8 G/DL (ref 31–37)
MCV RBC AUTO: 80.9 FL (ref 74–97)
MICROALBUMIN UR-MCNC: 8.72 MG/DL (ref 0–3)
MICROALBUMIN/CREAT UR-RTO: 76 MG/G (ref 0–30)
MONOCYTES # BLD: 0.3 K/UL (ref 0.05–1.2)
MONOCYTES NFR BLD: 8 % (ref 3–10)
NEUTS SEG # BLD: 1.7 K/UL (ref 1.8–8)
NEUTS SEG NFR BLD: 43 % (ref 40–73)
NITRITE UR QL STRIP.AUTO: NEGATIVE
PH UR STRIP: 5 [PH] (ref 5–8)
PHOSPHATE SERPL-MCNC: 3.2 MG/DL (ref 2.5–4.9)
PLATELET # BLD AUTO: 182 K/UL (ref 135–420)
PMV BLD AUTO: 9.8 FL (ref 9.2–11.8)
POTASSIUM SERPL-SCNC: 4.1 MMOL/L (ref 3.5–5.5)
PROT UR STRIP-MCNC: NEGATIVE MG/DL
PTH-INTACT SERPL-MCNC: 121.1 PG/ML (ref 18.4–88)
RBC # BLD AUTO: 3.66 M/UL (ref 4.2–5.3)
RBC #/AREA URNS HPF: NORMAL /HPF (ref 0–5)
SODIUM SERPL-SCNC: 146 MMOL/L (ref 136–145)
SP GR UR REFRACTOMETRY: 1.01 (ref 1–1.03)
TIBC SERPL-MCNC: 255 UG/DL (ref 250–450)
UROBILINOGEN UR QL STRIP.AUTO: 0.2 EU/DL (ref 0.2–1)
WBC # BLD AUTO: 4 K/UL (ref 4.6–13.2)
WBC URNS QL MICRO: NORMAL /HPF (ref 0–4)

## 2019-12-05 PROCEDURE — 85025 COMPLETE CBC W/AUTO DIFF WBC: CPT

## 2019-12-05 PROCEDURE — 36415 COLL VENOUS BLD VENIPUNCTURE: CPT

## 2019-12-05 PROCEDURE — 81001 URINALYSIS AUTO W/SCOPE: CPT

## 2019-12-05 PROCEDURE — 83970 ASSAY OF PARATHORMONE: CPT

## 2019-12-05 PROCEDURE — 82043 UR ALBUMIN QUANTITATIVE: CPT

## 2019-12-05 PROCEDURE — 80069 RENAL FUNCTION PANEL: CPT

## 2019-12-05 PROCEDURE — 83540 ASSAY OF IRON: CPT

## 2019-12-16 ENCOUNTER — HOSPITAL ENCOUNTER (OUTPATIENT)
Dept: PHYSICAL THERAPY | Age: 69
Discharge: HOME | End: 2019-12-16
Payer: MEDICARE

## 2019-12-16 PROCEDURE — 97530 THERAPEUTIC ACTIVITIES: CPT

## 2019-12-16 PROCEDURE — 97110 THERAPEUTIC EXERCISES: CPT

## 2019-12-16 PROCEDURE — 97161 PT EVAL LOW COMPLEX 20 MIN: CPT

## 2019-12-16 PROCEDURE — 97162 PT EVAL MOD COMPLEX 30 MIN: CPT

## 2019-12-16 RX ORDER — GABAPENTIN 100 MG/1
100 CAPSULE ORAL 3 TIMES DAILY
Qty: 90 CAP | Refills: 1 | Status: SHIPPED | OUTPATIENT
Start: 2019-12-16 | End: 2020-04-07 | Stop reason: SDUPTHER
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
## 2019-12-18 ENCOUNTER — HOSPITAL ENCOUNTER (OUTPATIENT)
Dept: PHYSICAL THERAPY | Age: 69
Discharge: HOME | End: 2019-12-18
Payer: MEDICARE

## 2019-12-18 PROCEDURE — 97110 THERAPEUTIC EXERCISES: CPT

## 2019-12-18 PROCEDURE — 97140 MANUAL THERAPY 1/> REGIONS: CPT

## 2019-12-18 PROCEDURE — 97112 NEUROMUSCULAR REEDUCATION: CPT

## 2019-12-18 NOTE — PROGRESS NOTES
PT DAILY TREATMENT NOTE  10-18    Patient Name: Kurt Torres  Date:2019  : 1950  [x]  Patient  Verified  Payor: [place] MEDICARE / Plan: [place] MEDICARE PART A & B / Product Type: Medicare /    In time: 10:30   Out time: 11:16  Total Treatment Time (min): 55  Visit #: 3 of 10    Medicare/BCBS Only   Total Timed Codes (min): 46 1:1 Treatment Time: 38     Treatment Area: Spinal stenosis, cervical region [M48.02]  Radiculopathy, cervical region [M54.12]  Other cervical disc degeneration, unspecified cervical region [M50.30]    SUBJECTIVE  Pain Level (0-10 scale): 7  Any medication changes, allergies to medications, adverse drug reactions, diagnosis change, or new procedure performed?: [x] No    [] Yes (see summary sheet for update)  Subjective functional status/changes:   [] No changes reported  Pt states everything is hurting today. OBJECTIVE    28 min Therapeutic Exercise:  [x] See flow sheet :    Rationale: increase ROM and increase strength to improve the patients ability to tolerate ADLs    10 min Neuromuscular Re-education:  [x]  See flow sheet : balance exercises, scapular re-education exercises. Rationale: increase strength, improve coordination, improve balance and increase proprioception  to improve the patients safety with gait to avoid increased neck pain or injury. 8 min Manual Therapy:  SOR, manual c/s distraction, STM B c/s paraspinals, DTM/TPR right>left UT/levator    Rationale: decrease pain, increase ROM, increase tissue extensibility and decrease trigger points to improve activity tolerance. With   [x] TE   [] TA   [] neuro   [] other: Patient Education: [x] Review HEP    [] Progressed/Changed HEP based on:   [] positioning   [] body mechanics   [] transfers   [] heat/ice application    [] other:      Other Objective/Functional Measures: Initiated exercises/interventions in flow sheet.  Tightness and tenderness reported to palpation to the right>left UT/levator region. Increased right hip flex and decreased right ankle DF AROM noted with gait with FWW. Mild instability noted with rhomberg/MSR EO on floor with no LOB. Mild-moderate instability noted with 1/2 stance balance on 4 inch step with EO. Pain Level (0-10 scale) post treatment: 8    ASSESSMENT/Changes in Function: Reported mild increase in pain post session today but did not report having any increased pain with any of the exercises or interventions today. Pt states she thinks right LE/foot drag is from when she had her right TKR. She states she is not sure if this right foot dragging causes her to fall. We will monitor the pt's symptoms and balance to improve safety with mobility and pain. Continue POC as tolerated. Patient will continue to benefit from skilled PT services to modify and progress therapeutic interventions, address functional mobility deficits, address ROM deficits, address strength deficits, analyze and address soft tissue restrictions, analyze and cue movement patterns, analyze and modify body mechanics/ergonomics, assess and modify postural abnormalities, address imbalance/dizziness and instruct in home and community integration to attain remaining goals. []  See Plan of Care  []  See progress note/recertification  []  See Discharge Summary         Progress towards goals / Updated goals:  Short Term Goals: To be accomplished in 2 treatments:  1. Pt will report compliance and independence to HEP to help the pt manage their pain and symptoms. Eval: established   Long Term Goals: To be accomplished in 10 treatments:  1. Pt will report a decrease in at worst pain to 2/10 in the neck to improve ability to perform functional tasks with less pain. Eval: 10/10 at worst  2. Pt will increase AROM c/s flex/EXT to WNL, left SB to 25 degs, right rotation to WNL, all with mild to no increased neck pain to improve ability to tolerate cooking activities.   Eval: flex 47 degs with pain in the neck and skull, EXT 51 degs with pain to the shoulder blade, left SB 16 degs with pain in the left neck, right rotation 64 degs with pain in the left neck. 3. Pt will report having no falls over the past 2 weeks to improve the pt's safety at home and decrease risk of increased pain/injury.    Eval: reports falls 2 days ago    PLAN  [x]  Upgrade activities as tolerated     [x]  Continue plan of care  [x]  Update interventions per flow sheet       []  Discharge due to:_  []  Other:_      Valeria Fierro, PT 12/18/2019  10:50 AM    Future Appointments   Date Time Provider Sherri Anguiano   12/18/2019 10:30 AM Dulce Weinstein, PT [facility]   12/20/2019 10:25 AM Matti Funez MD [facility]   2/27/2020 10:30 AM Shelbi Cleveland MD [facility]  10.

## 2019-12-20 ENCOUNTER — OFFICE VISIT (OUTPATIENT)
Dept: ORTHOPEDIC SURGERY | Age: 69
End: 2019-12-20

## 2019-12-20 VITALS
BODY MASS INDEX: 28.51 KG/M2 | WEIGHT: 151 LBS | HEIGHT: 61 IN | OXYGEN SATURATION: 98 % | RESPIRATION RATE: 16 BRPM | HEART RATE: 61 BPM | SYSTOLIC BLOOD PRESSURE: 119 MMHG | DIASTOLIC BLOOD PRESSURE: 80 MMHG

## 2019-12-20 DIAGNOSIS — M50.30 DDD (DEGENERATIVE DISC DISEASE), CERVICAL: ICD-10-CM

## 2019-12-20 DIAGNOSIS — M54.12 CERVICAL NEURITIS: ICD-10-CM

## 2019-12-20 DIAGNOSIS — M48.02 CERVICAL SPINAL STENOSIS: Primary | ICD-10-CM

## 2019-12-20 NOTE — LETTER
12/20/19 Patient: Jefferson Billy YOB: 1950 Date of Visit: 12/20/2019 Remi Reza, [address] [id number] [place] [address] VIA In Basket Dear Remi Reza MD, Thank you for referring Ms. Karlie Hernandez to [address] for evaluation. My notes for this consultation are attached. If you have questions, please do not hesitate to call me. I look forward to following your patient along with you. Sincerely, Lizet Garcia MD

## 2019-12-20 NOTE — PROGRESS NOTES
[facility]  [address], [place]   Phone: [phone]  Fax: [phone]        PROGRESS NOTE      HISTORY OF PRESENT ILLNESS:  The patient is a 68 y.o. female and was seen today for follow up of neck pain following fall at work in [address] [place] [address]. She reports progressive, constant falls, at least 1 fall per day x 1 year. However she hasn't had a fall since July 2019. Pt has diffuse, widespread, pain complaint. She denies h/o spinal surgery. Pt reports improvement due to PT. Pt recalls spinal injections through pain management years ago. Pt reports she was receiving trigger injections through Dr. Ifeanyi Gaston. She reports Dr. Ifeanyi Gaston discharged her two months ago due to improvement in pain and falls. Treated with Norco and Flexeril with benefit. Allergic to Flexeril, Nabumetone, Tramadol, and contrast. Pt is not a candidate for CYMBALTA secondary to Wellbutrin. Pt is a poor historian. Pt denies fever, weight loss, or skin changes. The patient is RHD. PmHx overactive bladder, DM, anemia, stage 3 CKD (Dr. Sherron Mckinney), bipolar disorder (not followed by a psychiatrist), right knee replacement (2012), neuropathy. Note from Lizett Lake MD dated 8/15/19 indicating patient was seen with c/o multiple falls. Feels dizzy at times with unsteady gait. Week prior had fallen backwards. Worsening pain in the entire back. Chronic x several years. She additionally has a right foot drop and uses canes for ambulation. She has had a neuro workup. Chronic ischemic changes noted by MRI. Pt has a home health nurse. Treating with Norco. Referred to nephrology. Recent A1c was 6.3, date unknown. Referred to me for neck pain. Cervical spine CT dated 3/16/17 films not available for my review. Per report,No evidence of fracture or acute traumatic subluxation involving the cervical spine. Reversal of the usual cervical lordosis, unchanged from prior radiographs.  Multilevel cervical spondylosis, most pronounced C4-C7. Mild to moderate osseous canal stenosis at C5-C6. Multilevel uncovertebral and facet joint osteoarthritis with neuroforaminal narrowing as above. Enlarged thyroid gland. Correlation with prior ultrasound imaging of the thyroid is recommended. If no prior imaging is available, consideration may be given to a nonemergent thyroid ultrasound. Cervical spine XR dated 8/15/19 reviewed. Per report, Multilevel marked degenerative disc disease mid to lower cervical spine. Thoracic spine XR dated 8/15/19 films not available for my review. Per report,Very subtle scoliosis, as described. Mild spondylitic degenerative change with endplate osteophytes at several levels in the midthoracic spine without other significant bony abnormality. Lumbar spine XR dated 8/15/19 films not available for my review. Per report, No acute bony abnormality is seen. Mild anterior spondylolisthesis L4-5. Possible facet arthropathy at L4-5 and L5-S1. No other significant bony abnormality is seen. Cervical spine MRI dated 11/19/19 reviewed. Per report, Overall straightening of the normal cervical lordosis with gradual C4-C6 kyphosis and multilevel degenerative disc disease. Associated cervical cord impingement, moderate at C4-C5, moderate at C5-C6 and minimal at C6-C7. Facet arthropathy and uncovertebral proliferative changes cause several high-grade foraminal stenoses - severe on the right at C4-C5, moderately severe on the right and severe on the left at C5-C6 and C6-C7. At her last clinical appointment, I tried her on Neurontin 100 mg TID. I checked with Dr. Mary Pearson to determine maximum safe dose of Neurontin due to hx of stage 3 CKD. I referred her to physical therapy with an emphasis on HEP. The patient returns today with pain location and distribution remain unchanged. She rates her pain 7/10, previously 6/10. Pt reports one fall since last OV. Previously ollowed by neurologist, pt does not recall who.  She is tolerating Neurontin 100 mg TID, too soon to assess benefit. Dr. Caridad Saavedra called and notified us maximum safe dose of Neurontin was 700 mg TID. Pt is currently enrolled in PT. She reports difficulty with getting to hertherpay sessions due to transportation issues. She is performing her HEP.  reviewed. Body mass index is 28.53 kg/m². PCP: Cipriano Saleh MD      Past Medical History:   Diagnosis Date    Allergic rhinitis     Anemia     Asthma     Candida vaginitis     CKD (chronic kidney disease) stage 3, GFR 30-59 ml/min (HCC)     COPD (chronic obstructive pulmonary disease) (HCC)     Diabetes (HCC)     Diabetes [facility]     GERD (gastroesophageal reflux disease)     h pylori gastritis egd 11/14 dr Amina Romero    Headache     Hx of colonoscopy 11/05/2014    normal rpt 10 years, 11/2024, dr. Michael Khoury Hypercholesterolemia     Mild dementia ([facility] [address]     OAB (overactive bladder)     CHRIS on CPAP     Radiculopathy     Urinary incontinence     UTI (urinary tract infection)         Social History     Socioeconomic History    Marital status:      Spouse name: Not on file    Number of children: Not on file    Years of education: Not on file    Highest education level: Not on file   Occupational History    Not on file   Social Needs    Financial resource strain: Not on file    Food insecurity:     Worry: Not on file     Inability: Not on file    Transportation needs:     Medical: Not on file     Non-medical: Not on file   Tobacco Use    Smoking status: Former Smoker     Years: 20.00    Smokeless tobacco: Former User     Quit date: 1/1/1999   Substance and Sexual Activity    Alcohol use:  Yes    Drug use: No    Sexual activity: Not Currently     Partners: Male   Lifestyle    Physical activity:     Days per week: Not on file     Minutes per session: Not on file    Stress: Not on file   Relationships    Social connections:     Talks on phone: Not on file     Gets together: Not on file     Attends Church service: Not on file     Active member of club or organization: Not on file     Attends meetings of clubs or organizations: Not on file     Relationship status: Not on file    Intimate partner violence:     Fear of current or ex partner: Not on file     Emotionally abused: Not on file     Physically abused: Not on file     Forced sexual activity: Not on file   Other Topics Concern    Not on file   Social History Narrative    Not on file       Current Outpatient Medications   Medication Sig Dispense Refill    gabapentin (NEURONTIN) 100 mg capsule Take 1 Cap by mouth three (3) times daily. Max Daily Amount: 300 mg. 90 Cap 1    oxybutynin (DITROPAN) 5 mg tablet Take 1 Tab by mouth two (2) times a day. Indications: overactive bladder 180 Tab 3    pantoprazole (PROTONIX) 40 mg tablet Take 1 Tab by mouth daily. TAKE 1 TABLET BY MOUTH ONCE DAILY  Indications: gastroesophageal reflux disease 90 Tab 3    atorvastatin (LIPITOR) 10 mg tablet Take 1 Tab by mouth daily. TAKE 1 TABLET BY MOUTH ONCE DAILY  Indications: high cholesterol 90 Tab 3    montelukast (SINGULAIR) 10 mg tablet Take 1 Tab by mouth daily. Indications: controller medication for asthma 90 Tab 3    gabapentin (NEURONTIN) 100 mg capsule Take 1 Cap by mouth three (3) times daily. Max Daily Amount: 300 mg. 90 Cap 1    buPROPion XL (WELLBUTRIN XL) 150 mg tablet Take 1 Tab by mouth every morning. Indications: Bipolar Depression 30 Tab 0    sertraline (ZOLOFT) 100 mg tablet Take 200 mg by mouth daily. Indications: major depressive disorder      acetaminophen (TYLENOL) 500 mg capsule Take 500 mg by mouth every six (6) hours as needed for Pain (back).  olopatadine (PAZEO) 0.7 % drop Apply 1 Drop to eye daily. both eyes  Indications: Allergic Conjunctivitis      sodium chloride (OCEAN) 0.65 % nasal squeeze bottle 1 Almo by Both Nostrils route as needed for Congestion.       prednisoLONE acetate (PRED FORTE) 1 % ophthalmic suspension Administer 1 Drop to both eyes four (4) times daily. Indications: Prevent Inflammation of the Cornea due to the Medication Cytarabine      fluticasone-vilanterol (BREO ELLIPTA) 200-25 mcg/dose inhaler Take 1 Puff by inhalation daily. Indications: Controller Medication for Asthma      fluticasone (VERAMYST) 27.5 mcg/actuation nasal spray [place]  in Nose one spray each nostril daily       cycloSPORINE (RESTASIS) 0.05 % ophthalmic emulsion Instill 1 Drop in affected eye(s) Every 12 hours.  ipratropium-albuterol (COMBIVENT RESPIMAT)  mcg/actuation inhaler Take 1 inhalation by mouth Take As Needed daily for asthma         Allergies   Allergen Reactions    Flexeril [Cyclobenzaprine] Other (comments)     Patient states not allergic to this medication.  Iodine Swelling    Nabumetone Swelling    Penicillins Unknown (comments)     Other reaction(s): Other (see comments)    Shellfish Containing Products Swelling    Sulfa (Sulfonamide Antibiotics) Swelling    Tramadol Itching          PHYSICAL EXAMINATION    Visit Vitals  /80   Pulse 61   Resp 16   Ht 5' 1\" (1.549 m)   Wt 151 lb (68.5 kg)   SpO2 98%   BMI 28.53 kg/m²       CONSTITUTIONAL: NAD, A&O x 3  SENSATION: Intact to light touch throughout   NEURO: Opal's is negative bilaterally. RANGE OF MOTION: The patient has full passive range of motion in all four extremities. Shoulder AB/Flex Elbow Flex Wrist Ext Elbow Ext Wrist Flex Hand Intrin Tone   Right +4/5 +4/5 +4/5 +4/5 +4/5 +4/5 +4/5   Left +4/5 +4/5 +4/5 +4/5 +4/5 +4/5 +4/5               Ambulates with a rolling walker. ASSESSMENT   Diagnoses and all orders for this visit:    1. Cervical spinal stenosis    2. Cervical neuritis    3. DDD (degenerative disc disease), cervical      IMPRESSION AND PLAN:  I will refer her to neurology for an EMG of the BUE to rule out radiculopathy. Pt reports several falls; 1 since last OV.  Based on her cervical MRI, I did not appreciate spinal pathology to account for this. Reports she was followed by a neurologist for this but does not recall who. I will check with Aisha Cantu MD to determine who her neurologist is. Patient should continue on Neurontin at the current dose, as it is too early to assess the full benefits of this dose or increase the dose at this time. Patient should complete physical therapy as prescribed and perform her HEP daily following that. I will see the patient back following EMG. Written by Andreia Tolentino, as dictated by Eliecer Perez MD  I examined the patient, reviewed and agree with the note.

## 2020-01-06 ENCOUNTER — TELEPHONE (OUTPATIENT)
Dept: ORTHOPEDIC SURGERY | Age: 70
End: 2020-01-06

## 2020-01-06 ENCOUNTER — DOCUMENTATION ONLY (OUTPATIENT)
Dept: ORTHOPEDIC SURGERY | Age: 70
End: 2020-01-06

## 2020-01-06 ENCOUNTER — HOSPITAL ENCOUNTER (OUTPATIENT)
Dept: PHYSICAL THERAPY | Age: 70
Discharge: HOME | End: 2020-01-06
Payer: MEDICARE

## 2020-01-06 DIAGNOSIS — M48.02 CERVICAL SPINAL STENOSIS: ICD-10-CM

## 2020-01-06 DIAGNOSIS — M50.30 DDD (DEGENERATIVE DISC DISEASE), CERVICAL: ICD-10-CM

## 2020-01-06 DIAGNOSIS — M54.12 CERVICAL NEURITIS: ICD-10-CM

## 2020-01-06 PROCEDURE — 97110 THERAPEUTIC EXERCISES: CPT | Performed by: PHYSICAL THERAPIST

## 2020-01-06 PROCEDURE — 97112 NEUROMUSCULAR REEDUCATION: CPT | Performed by: PHYSICAL THERAPIST

## 2020-01-06 PROCEDURE — 97140 MANUAL THERAPY 1/> REGIONS: CPT | Performed by: PHYSICAL THERAPIST

## 2020-01-06 RX ORDER — GABAPENTIN 100 MG/1
100 CAPSULE ORAL 3 TIMES DAILY
Qty: 90 CAP | Refills: 1 | Status: CANCELLED | OUTPATIENT
Start: 2020-01-06

## 2020-01-06 NOTE — TELEPHONE ENCOUNTER
I see where the script from 12/16/19 was set to \"print\" but I do not see an encounter as to where the script went. [address] is requesting a refill. Please advise and sign if appropriate. Last Visit: 12/20/19 with MD Murtaza Ayala  Next Appointment: return for following EMG    Requested Prescriptions     Pending Prescriptions Disp Refills    gabapentin (NEURONTIN) 100 mg capsule 90 Cap 1     Sig: Take 1 Cap by mouth three (3) times daily. Max Daily Amount: 300 mg.

## 2020-01-06 NOTE — PROGRESS NOTES
PT DAILY TREATMENT NOTE 10-18    Patient Name: Randall Gutierrez  Date:2020  : 1950  [x]  Patient  Verified  Payor: Willian Drafts / Plan: [place] MEDICARE PART A & B / Product Type: Medicare /    In time:10:45  Out time:11:44  Total Treatment Time (min): 61  Visit #: 4 of 10    Medicare/BCBS Only   Total Timed Codes (min):  59 1:1 Treatment Time:  53       Treatment Area: Spinal stenosis, cervical region [M48.02]  Radiculopathy, cervical region [M54.12]  Other cervical disc degeneration, unspecified cervical region [M50.30]    SUBJECTIVE  Pain Level (0-10 scale): 8-9/10  Any medication changes, allergies to medications, adverse drug reactions, diagnosis change, or new procedure performed?: [x] No    [] Yes (see summary sheet for update)  Subjective functional status/changes:   [] No changes reported  Patient has not attended therapy since  indicating she was told she could not be fit in, which was certainly not the case. OBJECTIVE    35 min Therapeutic Exercise:  [] See flow sheet : Emphasis on UE strength and neck AROM and strength. Rationale: increase ROM and increase strength to improve the patients ability to increase her functional activity level. 14 min Neuromuscular Re-education:  []  See flow sheet : Emphasis on balance and spinal stability. Rationale: increase strength, improve coordination, improve balance and increase proprioception  to improve the patients ability to ambulate safely and to prevent falls that would further increase her neck and back pain. 10 min Manual Therapy:  Manual traction; STM/DTM/TPR bilateral UT, LS   Rationale: decrease pain, increase ROM, increase tissue extensibility and decrease trigger points to increase ease of motion to improve function.               With   [] TE   [] TA   [] neuro   [] other: Patient Education: [x] Review HEP    [] Progressed/Changed HEP based on:   [] positioning   [] body mechanics   [] transfers   [] heat/ice application [] other:      Other Objective/Functional Measures: Patient ambulates with rolling walker. She has significant trigger points in the UT bilaterally. Tenderness in bilateral UT and LS. Pain Level (0-10 scale) post treatment: 9/10    ASSESSMENT/Changes in Function: Patient continues with significant neck pain with trigger points. Patient will continue to benefit from skilled PT services to modify and progress therapeutic interventions, address functional mobility deficits, address ROM deficits, address strength deficits, analyze and address soft tissue restrictions, analyze and cue movement patterns, analyze and modify body mechanics/ergonomics, assess and modify postural abnormalities, address imbalance/dizziness and instruct in home and community integration to attain remaining goals. [x]  See Plan of Care  []  See progress note/recertification  []  See Discharge Summary         Progress towards goals / Updated goals:  Short Term Goals: To be accomplished in 2 treatments:  1. Pt will report compliance and independence to HEP to help the pt manage their pain and symptoms.             Eval: established   Current:  Reports compliance with her HEP.  1/6/2020. Goal Met. Long Term Goals: To be accomplished in 10 treatments:  1. Pt will report a decrease in at worst pain to 2/10 in the neck to improve ability to perform functional tasks with less pain. Eval : 10/10 at worst  Current: 9-10/10.  1/6/2020. No change. 2. Pt will increase AROM c/s flex/EXT to WNL, left SB to 25 degs, right rotation to WNL, all with mild to no increased neck pain to improve ability to tolerate cooking activities. Eval: flex 52 degs with pain in the neck and skull, EXT 51 degs with pain to the shoulder blade, left SB 16 degs with pain in the left neck, right rotation 64 degs with pain in the left neck.    3. Pt will report having no falls over the past 2 weeks to improve the pt's safety at home and decrease risk of increased pain/injury.   Eval: reports falls 2 days ago  Current:  Patient reports 1 fall since last OV but is not sure when that happened. 1/6/2020.     PLAN  [x]  Upgrade activities as tolerated     [x]  Continue plan of care  []  Update interventions per flow sheet       []  Discharge due to:_  []  Other:_      Skip Sulema, PT 1/6/2020  10:52 AM    Future Appointments   Date Time Provider Sherri Annmarie   1/6/2020 11:30 AM Myrna Bahena PT [facility]   2/11/2020  2:20 PM  [address]   2/27/2020 10:30 AM Susy Joe MD [facility] [address]

## 2020-01-06 NOTE — PROGRESS NOTES
EMG scheduled with Dr. Mark Morris for 02- @ 2:20pm [address] [place] [address] office , patient aware

## 2020-01-07 NOTE — TELEPHONE ENCOUNTER
I called the pt to get more information and she was not able to be reached on either number. The mobile number listed is no longer in service and the home number went to voicemail. A brief message was left for the pt asking her to contact the office regarding a question about her neurontin. The number to the office was provided for the pt.

## 2020-01-08 ENCOUNTER — HOSPITAL ENCOUNTER (OUTPATIENT)
Dept: PHYSICAL THERAPY | Age: 70
End: 2020-01-08
Payer: MEDICARE

## 2020-01-08 NOTE — TELEPHONE ENCOUNTER
I called and spoke to the pt about her neurontin prescription. The pt was identified using 2 pt identifiers. She stated that she got her December prescription filled at [facility] and does still have a refill on it. She would like to use pill pack for her next prescription as her pharmacy. Pt will contact the office when she is due for another prescription. She will get the last refill at [facility]. No questions or concerns from the pt at this time.

## 2020-01-08 NOTE — TELEPHONE ENCOUNTER
Patient called stating that she does not have the printed script and it was supposed to go to her pharmacy electronically.  She asked for a call back at [phone]

## 2020-01-10 ENCOUNTER — HOSPITAL ENCOUNTER (OUTPATIENT)
Dept: PHYSICAL THERAPY | Age: 70
Discharge: HOME | End: 2020-01-10
Payer: MEDICARE

## 2020-01-10 PROCEDURE — 97140 MANUAL THERAPY 1/> REGIONS: CPT | Performed by: PHYSICAL THERAPIST

## 2020-01-10 PROCEDURE — 97112 NEUROMUSCULAR REEDUCATION: CPT | Performed by: PHYSICAL THERAPIST

## 2020-01-10 PROCEDURE — 97110 THERAPEUTIC EXERCISES: CPT | Performed by: PHYSICAL THERAPIST

## 2020-01-10 NOTE — PROGRESS NOTES
PT DAILY TREATMENT NOTE 10-18    Patient Name: Denise Cox  Date:1/10/2020  : 1950  [x]  Patient  Verified  Payor: [place] MEDICARE / Plan: VA MEDICARE PART A & B / Product Type: Medicare /    In time:11:30  Out time:12:27  Total Treatment Time (min): 62  Visit #: 5 of 10    Medicare/BCBS Only   Total Timed Codes (min):  57 1:1 Treatment Time:  40       Treatment Area: Spinal stenosis, cervical region [M48.02]  Radiculopathy, cervical region [M54.12]  Other cervical disc degeneration, unspecified cervical region [M50.30]    SUBJECTIVE  Pain Level (0-10 scale): 8-9  Any medication changes, allergies to medications, adverse drug reactions, diagnosis change, or new procedure performed?: [x] No    [] Yes (see summary sheet for update)  Subjective functional status/changes:   [] No changes reported  Patient continues to c/o pain in her neck, pain remains very high at 9-10/10. At the end of her session she noted \"not much\" pain and rated her pain at 8/10. OBJECTIVE    33 min Therapeutic Exercise:  [] See flow sheet :   Rationale: increase ROM and increase strength to improve the patients ability to increase her functional activity level. 14 min Neuromuscular Re-education:  []  See flow sheet :   Rationale: increase strength, improve coordination, improve balance and increase proprioception  to improve the patients ability to ambulate safely and prevent falls that would further increase her neck pain. 10 min Manual Therapy:  Manual traction; STM/DTM/TPR bilateral UT   Rationale: decrease pain, increase ROM, increase tissue extensibility and decrease trigger points to increase ease of motion to improve function.             With   [] TE   [] TA   [] neuro   [] other: Patient Education: [x] Review HEP    [] Progressed/Changed HEP based on:   [] positioning   [] body mechanics   [] transfers   [] heat/ice application    [] other:      Other Objective/Functional Measures: AROM C/S:  Flex 48 with pain down her back; ext 30 with pain right posterior C/S; Right SB 15 with pain in right C/S; Left SB 17 with pain on left;  Right Rot 58 with pain on right; Left Rot 52 with pain on right. Pain Level (0-10 scale) post treatment: 8/10    ASSESSMENT/Changes in Function: Patient with continued pain and increased perception of pain intensity. Patient will continue to benefit from skilled PT services to modify and progress therapeutic interventions, address functional mobility deficits, address ROM deficits, address strength deficits, analyze and address soft tissue restrictions, analyze and cue movement patterns, analyze and modify body mechanics/ergonomics, assess and modify postural abnormalities, address imbalance/dizziness and instruct in home and community integration to attain remaining goals. [x]  See Plan of Care  []  See progress note/recertification  []  See Discharge Summary         Progress towards goals / Updated goals:  Short Term Goals: To be accomplished in 2 treatments:  1. Pt will report compliance and independence to HEP to help the pt manage their pain and symptoms.             Eval: established   Current:  Reports compliance with her HEP.  1/6/2020. Goal Met.     Long Term Goals: To be accomplished in 10 treatments:  1. Pt will report a decrease in at worst pain to 2/10 in the neck to improve ability to perform functional tasks with less pain. Eval : 10/10 at worst  Current: 9-10/10.  1/10/2020. No change. 2. Pt will increase AROM c/s flex/EXT to WNL, left SB to 25 degs, right rotation to WNL, all with mild to no increased neck pain to improve ability to tolerate cooking activities. Eval: flex 52 degs with pain in the neck and skull, EXT 51 degs with pain to the shoulder blade, left SB 16 degs with pain in the left neck, right rotation 64 degs with pain in the left neck.   Current:  Flex 48 with pain down her back; ext 30 with pain right posterior C/S; Right SB 15 with pain in right C/S; Left SB 17 with pain on left;  Right Rot 58 with pain on right; Left Rot 52 with pain on right. 1/10/20. No significant changes noted. 3. Pt will report having no falls over the past 2 weeks to improve the pt's safety at home and decrease risk of increased pain/injury.   Eval: reports falls 2 days ago  Current:  Patient states no falls since her last one on 1/4/20.  1/10/20. Progressing.     PLAN  [x]  Upgrade activities as tolerated     [x]  Continue plan of care  []  Update interventions per flow sheet       []  Discharge due to:_  []  Other:_      Rose Raza, PT 1/10/2020  11:46 AM    Future Appointments   Date Time Provider Sherri Anguiano   1/13/2020  8:00 AM Joana Torres PTA [facility]   1/21/2020  8:00 AM Megan Butcher, PT [facility]   1/24/2020 11:30 AM Megan Butcher PT [facility]   1/27/2020 11:30 AM Joana Torres PTA [facility]   1/29/2020 11:00 AM Megan Butcher PT [facility]   2/11/2020  2:20 PM [facility] [address]   2/27/2020 10:30 AM Carmen Charlton MD [facility] SCHED

## 2020-01-13 ENCOUNTER — APPOINTMENT (OUTPATIENT)
Dept: PHYSICAL THERAPY | Age: 70
End: 2020-01-13
Payer: MEDICARE

## 2020-01-15 ENCOUNTER — APPOINTMENT (OUTPATIENT)
Dept: PHYSICAL THERAPY | Age: 70
End: 2020-01-15
Payer: MEDICARE

## 2020-01-21 ENCOUNTER — APPOINTMENT (OUTPATIENT)
Dept: PHYSICAL THERAPY | Age: 70
End: 2020-01-21
Payer: MEDICARE

## 2020-01-23 NOTE — TELEPHONE ENCOUNTER
This pt has a refill on her neurontin at [facility]. She needs to get this filled for now. She is suppose to finish PT and get EMG (has she done the EMG?)  and then f/u with Dr Caridad Donaldson.   She needs to go ahead and schedule an appt with Dr Caridad Donaldson for 1 month and if he continues the neurontin then it can be sent to pill pack

## 2020-01-23 NOTE — TELEPHONE ENCOUNTER
Patient called again and said she is out of her Neurontin medication. Patient is asking for a refill from [address] Patient is asking that someone contact her pharmacy [facility] in [place], [place]   Tel. [phone]. Patient also said she has been in a lot of pain , from her Neck down to her Back. That [facility] and not being Hydrated. Patient said she has also had foot pain and had to see a foot specialist.    Patient said that due to the pain , she has missed three appointments with [facility] in [place]. Patient tel. [phone].

## 2020-01-23 NOTE — TELEPHONE ENCOUNTER
I called and spoke to the pt. The pt was identified using 2 pt identifiers. She was notified that there is still one refill remaining on her current prescription for the nuerontin at her [facility] DR JODIE ROSENTHAL. The pt will contact them for this. She was asked if she had had the EMG that was ordered by Dr. Terri Zavaleta back in December 2019. She stated that she had not heard anything about it. The pt also did not make a follow up appt to see Dr. Terri Zavaleta when she had her last office visit. Ms. Hernan Yang states that she has so many doctors and appts that she gets confused about which ones she has. I informed the pt that I will have someone call her to get the EMG scheduled. She will need to schedule her follow up appt with Dr. Terri Zavaleta in about a month according to the nurse practitioner. The pt uses the [facility] location. The pt also stated that she has currently stopped her physical therapy due to pain and has missed the last 3 appts. Ms. Hernan Yang verbalized understanding on everything and has no questions or concerns at this time. I called and spoke to Allyne Busing at the [facility] location about scheduling the EMG. She stated that this was supposed to be done with [facility] but nothing has been done and she will be scheduling the pt herself.

## 2020-01-24 ENCOUNTER — APPOINTMENT (OUTPATIENT)
Dept: PHYSICAL THERAPY | Age: 70
End: 2020-01-24
Payer: MEDICARE

## 2020-01-27 ENCOUNTER — APPOINTMENT (OUTPATIENT)
Dept: PHYSICAL THERAPY | Age: 70
End: 2020-01-27
Payer: MEDICARE

## 2020-01-29 ENCOUNTER — APPOINTMENT (OUTPATIENT)
Dept: PHYSICAL THERAPY | Age: 70
End: 2020-01-29
Payer: MEDICARE

## 2020-02-13 NOTE — PROGRESS NOTES
[facility]              [address]        [address] [place]   [phone] fax    Discharge Summary  Patient name: Craig Clemens Start of Care: 2019   Referral source: Wesley Rosario MD : 1950   Medical/Treatment Diagnosis: Spinal stenosis, cervical region [M48.02]  Radiculopathy, cervical region [M54.12]  Other cervical disc degeneration, unspecified cervical region [M50.30]  Payor: VA MEDICARE / Plan: VA MEDICARE PART A & B / Product Type: Medicare /  Onset Date:2019     Prior Hospitalization: see medical history Provider#: [id number]   Medications: Verified on Patient Summary List    Comorbidities: per connect care: bipolar disorder, DM, chronic kidney disease stage 3, schizophrenia; depression, arthritis, recent weight change, asthma, reports having her right knee replaced in the past (did not give DOS). Prior Level of Function: Independent with ADLs and functional tasks with no limitations prior to , states she has been falling more often. Visits from Start of Care: 4    Missed Visits: 2  Reporting Period : 2019 to 2020    Summary of Care:  1. Pt will report a decrease in at worst pain to 2/10 in the neck to improve ability to perform functional tasks with less pain. Eval : 10/10 at worst  Current: 9-10/10.  1/10/2020. No change. 2. Pt will increase AROM c/s flex/EXT to WNL, left SB to 25 degs, right rotation to WNL, all with mild to no increased neck pain to improve ability to tolerate cooking activities. Eval: flex 52 degs with pain in the neck and skull, EXT 51 degs with pain to the shoulder blade, left SB 16 degs with pain in the left neck, right rotation 64 degs with pain in the left neck.   Current:  Flex 48 with pain down her back; ext 30 with pain right posterior C/S; Right SB 15 with pain in right C/S; Left SB 17 with pain on left;  Right Rot 58 with pain on right; Left Rot 52 with pain on right. 1/10/20. No significant changes noted. 3. Pt will report having no falls over the past 2 weeks to improve the pt's safety at home and decrease risk of increased pain/injury.   Eval: reports falls 2 days ago  Current:  Patient states no falls since her last one on 1/4/20.  1/10/20. Progressing. ASSESSMENT/RECOMMENDATIONS:  []Discontinue therapy: []Patient has reached or is progressing toward set goals      [x]Patient self discharged due to ongoing medical issues.       []Due to lack of appreciable progress towards set goals    Ata Valencia, PT 2/13/2020 10:53 AM

## 2020-02-20 ENCOUNTER — PATIENT OUTREACH (OUTPATIENT)
Dept: CASE MANAGEMENT | Age: 70
End: 2020-02-20

## 2020-02-24 ENCOUNTER — PATIENT OUTREACH (OUTPATIENT)
Dept: CASE MANAGEMENT | Age: 70
End: 2020-02-24

## 2020-02-24 ENCOUNTER — HOSPITAL ENCOUNTER (OUTPATIENT)
Dept: LAB | Age: 70
Discharge: HOME | End: 2020-02-24
Payer: MEDICARE

## 2020-02-24 DIAGNOSIS — E11.21 TYPE 2 DIABETES WITH NEPHROPATHY (HCC): ICD-10-CM

## 2020-02-24 LAB
ALBUMIN SERPL-MCNC: 3.4 G/DL (ref 3.4–5)
ALBUMIN/GLOB SERPL: 1 {RATIO} (ref 0.8–1.7)
ALP SERPL-CCNC: 74 U/L (ref 45–117)
ALT SERPL-CCNC: 20 U/L (ref 13–56)
ANION GAP SERPL CALC-SCNC: 4 MMOL/L (ref 3–18)
AST SERPL-CCNC: 19 U/L (ref 10–38)
BASOPHILS # BLD: 0 K/UL (ref 0–0.1)
BASOPHILS NFR BLD: 0 % (ref 0–2)
BILIRUB SERPL-MCNC: 0.3 MG/DL (ref 0.2–1)
BUN SERPL-MCNC: 15 MG/DL (ref 7–18)
BUN/CREAT SERPL: 10 (ref 12–20)
CALCIUM SERPL-MCNC: 9.2 MG/DL (ref 8.5–10.1)
CHLORIDE SERPL-SCNC: 110 MMOL/L (ref 100–111)
CHOLEST SERPL-MCNC: 193 MG/DL
CO2 SERPL-SCNC: 30 MMOL/L (ref 21–32)
CREAT SERPL-MCNC: 1.5 MG/DL (ref 0.6–1.3)
CREAT UR-MCNC: 48 MG/DL (ref 30–125)
DIFFERENTIAL METHOD BLD: ABNORMAL
EOSINOPHIL # BLD: 0.1 K/UL (ref 0–0.4)
EOSINOPHIL NFR BLD: 3 % (ref 0–5)
ERYTHROCYTE [DISTWIDTH] IN BLOOD BY AUTOMATED COUNT: 15.4 % (ref 11.6–14.5)
GLOBULIN SER CALC-MCNC: 3.3 G/DL (ref 2–4)
GLUCOSE SERPL-MCNC: 180 MG/DL (ref 74–99)
HBA1C MFR BLD: 12.3 % (ref 4.2–5.6)
HCT VFR BLD AUTO: 29 % (ref 35–45)
HDLC SERPL-MCNC: 95 MG/DL (ref 40–60)
HDLC SERPL: 2 {RATIO} (ref 0–5)
HGB BLD-MCNC: 9.5 G/DL (ref 12–16)
LDLC SERPL CALC-MCNC: 81.8 MG/DL (ref 0–100)
LIPID PROFILE,FLP: ABNORMAL
LYMPHOCYTES # BLD: 1.4 K/UL (ref 0.9–3.6)
LYMPHOCYTES NFR BLD: 33 % (ref 21–52)
MCH RBC QN AUTO: 26.6 PG (ref 24–34)
MCHC RBC AUTO-ENTMCNC: 32.8 G/DL (ref 31–37)
MCV RBC AUTO: 81.2 FL (ref 74–97)
MICROALBUMIN UR-MCNC: 5.08 MG/DL (ref 0–3)
MICROALBUMIN/CREAT UR-RTO: 106 MG/G (ref 0–30)
MONOCYTES # BLD: 0.4 K/UL (ref 0.05–1.2)
MONOCYTES NFR BLD: 8 % (ref 3–10)
NEUTS SEG # BLD: 2.4 K/UL (ref 1.8–8)
NEUTS SEG NFR BLD: 56 % (ref 40–73)
PLATELET # BLD AUTO: 165 K/UL (ref 135–420)
PMV BLD AUTO: 9.8 FL (ref 9.2–11.8)
POTASSIUM SERPL-SCNC: 3.9 MMOL/L (ref 3.5–5.5)
PROT SERPL-MCNC: 6.7 G/DL (ref 6.4–8.2)
RBC # BLD AUTO: 3.57 M/UL (ref 4.2–5.3)
SODIUM SERPL-SCNC: 144 MMOL/L (ref 136–145)
TRIGL SERPL-MCNC: 81 MG/DL (ref ?–150)
VLDLC SERPL CALC-MCNC: 16.2 MG/DL
WBC # BLD AUTO: 4.3 K/UL (ref 4.6–13.2)

## 2020-02-24 PROCEDURE — 80053 COMPREHEN METABOLIC PANEL: CPT

## 2020-02-24 PROCEDURE — 82043 UR ALBUMIN QUANTITATIVE: CPT

## 2020-02-24 PROCEDURE — 85025 COMPLETE CBC W/AUTO DIFF WBC: CPT

## 2020-02-24 PROCEDURE — 80061 LIPID PANEL: CPT

## 2020-02-24 PROCEDURE — 36415 COLL VENOUS BLD VENIPUNCTURE: CPT

## 2020-02-24 PROCEDURE — 83036 HEMOGLOBIN GLYCOSYLATED A1C: CPT

## 2020-02-24 NOTE — PROGRESS NOTES
Complex Case Management      Date/Time:  2020 1:43 PM    Method of communication with patient:phone    [facility] ([facility]) contacted the patient by telephone to perform Ambulatory Care Coordination. Verified name and  (PHI) with patient as identifiers. Provided introduction to self, and explanation of the Ambulatory Care Manager's role. Reviewed most recent clinic visit w/ patient who verbalized understanding. Patient given an opportunity to ask questions. Top Challenges reviewed with the patient   1. Hx of DM2, HLD, overactive bladder, Bipolar d/o, schizophrenia, neuropathy, anemia, CKD, GERD  2. Pt w/ recent hx of ED visits for hyperglycemia. Pt states blood sugars above 500. Pt also reports difficutlies w/ transportation, getting meals. 3. Recommended patient to speak with .  notified. The patient agrees to contact the PCP office or the [address] [place] [address] for questions related to their healthcare. Provided contact information for future reference. Disease Specific:   N/A    Home Health Active: No    DME Active: No    Barriers to care? depression, financial, support system, transportation, utilization of services    Advance Care Planning:   Does patient have an Advance Directive:  not on file; education provided     Medication(s):   Medication reconciliation was not performed with patient, who verbalizes understanding of administration of home medications. There were no barriers to obtaining medications identified at this time. Referral to Pharm D needed: no     Current Outpatient Medications   Medication Sig    gabapentin (NEURONTIN) 100 mg capsule Take 1 Cap by mouth three (3) times daily. Max Daily Amount: 300 mg.    oxybutynin (DITROPAN) 5 mg tablet Take 1 Tab by mouth two (2) times a day. Indications: overactive bladder    pantoprazole (PROTONIX) 40 mg tablet Take 1 Tab by mouth daily.  TAKE 1 TABLET BY MOUTH ONCE DAILY  Indications: gastroesophageal reflux disease    atorvastatin (LIPITOR) 10 mg tablet Take 1 Tab by mouth daily. TAKE 1 TABLET BY MOUTH ONCE DAILY  Indications: high cholesterol    montelukast (SINGULAIR) 10 mg tablet Take 1 Tab by mouth daily. Indications: controller medication for asthma    gabapentin (NEURONTIN) 100 mg capsule Take 1 Cap by mouth three (3) times daily. Max Daily Amount: 300 mg.  buPROPion XL (WELLBUTRIN XL) 150 mg tablet Take 1 Tab by mouth every morning. Indications: Bipolar Depression    sertraline (ZOLOFT) 100 mg tablet Take 200 mg by mouth daily. Indications: major depressive disorder    acetaminophen (TYLENOL) 500 mg capsule Take 500 mg by mouth every six (6) hours as needed for Pain (back).  olopatadine (PAZEO) 0.7 % drop Apply 1 Drop to eye daily. both eyes  Indications: Allergic Conjunctivitis    sodium chloride (OCEAN) 0.65 % nasal squeeze bottle 1 [place] by Both Nostrils route as needed for Congestion.  prednisoLONE acetate (PRED FORTE) 1 % ophthalmic suspension Administer 1 Drop to both eyes four (4) times daily. Indications: Prevent Inflammation of the Cornea due to the Medication Cytarabine    fluticasone-vilanterol (BREO ELLIPTA) 200-25 mcg/dose inhaler Take 1 Puff by inhalation daily. Indications: Controller Medication for Asthma    fluticasone (VERAMYST) 27.5 mcg/actuation nasal spray [place]  in Nose one spray each nostril daily     cycloSPORINE (RESTASIS) 0.05 % ophthalmic emulsion Instill 1 Drop in affected eye(s) Every 12 hours.  ipratropium-albuterol (COMBIVENT RESPIMAT)  mcg/actuation inhaler Take 1 inhalation by mouth Take As Needed daily for asthma     No current facility-administered medications for this visit.         [facility] follow up appointment(s):   Future Appointments   Date Time Provider Sherri Anguiano   2/27/2020 10:30 AM MD TATE Keller        Goals Addressed                 This Visit's Progress     Attends follow up appointments on schedule        02/24/20 Patient will attend all scheduled appointments through 05/24/20       Knowledge and adherence of prescribed medication (ie. action, side effects, missed dose, etc.).        02/24/20 Pt will take all medications prescribed to be evaluated on each outreach through  05/24/20       Prepare patients and caregivers for end of life decisions (ie. need for hospice, pain management, symptom relief, advance directives etc.)        02/24/20 Pt will complete an ACP and have it scanned into their EMR by 05/24/20

## 2020-02-25 ENCOUNTER — PATIENT OUTREACH (OUTPATIENT)
Dept: CASE MANAGEMENT | Age: 70
End: 2020-02-25
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
## 2020-02-26 ENCOUNTER — PATIENT OUTREACH (OUTPATIENT)
Dept: CASE MANAGEMENT | Age: 70
End: 2020-02-26

## 2020-02-26 NOTE — PROGRESS NOTES
MSW received return phone call from the patient's daughter Checo Mcfarland. MSW received consent to communicate with daughter and PHI verified. MSW introduced self, role and reason for call made and the patient's daughter verified name and  of patient as identifiers. MSW informed the patient's daughter of concerns and identified areas requiring assistance and support. The patient's daughter reported that the patient is difficult to work with and usually resorts to being rude, nasty and cursing at them. She also shared that the patient is not considerate when she calls to be taken to the grocery store after 10:00 at night and would take extended time to complete her shopping. She mentioned that the patient expects them to assist her . Daughter explained that she drives bus for the school system and is not available to assist her mother at all times. She made it clear that she cannot take off from work as often as that is her only income and she already doesn't make much. She mentioned that she has a daughter who has small children and is always willing to assist but  feels that the patient takes advantage of her. MSW informed the patient's daughter that she is assisting the patient with seeking a better place to live. The patient's daughter stated that she had plans to locate an apartment for both her and the patient to live but said that her mother doesn't listen, does what she wants and is not consistent with commitments (assisting with bills). MSW asked if she would be willing to assist the patient with locating an apartment of her own and being supportive to her. Patient's daughter responded that her and her daughter are available to extend self to some extent as long as the patient is being respectful to them.    She informed MSW that she had applied for Meals on Wheels for the patient in the past and was told that it will cost $75.00 per meal. MSW asked whether the cost was meant for a month vs per meal and she insisted that it was per meal. She is aware of the patient being screened for Medicaid about a week ago and shared that Ms Haydee Alfred completed the screening. MSW informed her that the process should take 6-8 weeks for updates on status. She also shared that the patient's [employer] was cancelled due to the patient not calling [employer] in advance to cancel scheduled rides when daughter would transport her. The daughter is aware that her scheduled transportation has been resumed. The patient's daughter is open to receiving updates when the patient needs assistance. MSW will continue to assist the patient as needed and will request assistance from daughter when needed.

## 2020-02-26 NOTE — PROGRESS NOTES
[facility] to assist with resources needed for the patient. MSW received call from Ms Paintingisauro Marcelinos who stated that she saw telephone number on her phone and was returning call. MSW introduced self and role and patient was able to recall yesterday's  conversation with MSW. The patient verified her name and  as identifiers. She was made aware that her daughter was reached and was informed that her daughter is willing to extend assistance to her as long as she was respectful. The patient agrees that she can be rude, nasty and curses at her family members. She is doubtful that they will make themselves available to assist her as indicated. MSW encouraged to meet with her daughter to communicate on establishing a better relationship. Patient shared that she will reach out to her daughter. MSW researched low income apartments for seniors in [facility] and [place]. Some of the leasing offices are requiring that the resident's income doubles the monthly rent. The patient's income is less than the requirement for most. MSW informed the patient and provided her with detailed information. The patient shared that she prefers 2 bedrooms vs 1 bedroom. MSW informed her that the cost may not allow her to have any money leftover. She mentioned about knowing a lady who owns a home and is charging less than she is currently paying. MSW asked how soon she is willing to move out of current residence and she mentioned that her goal is to be able to save what's leftover from paying her bills for a few months. MSW inquired whether she wanted to be assisted with contacting the lady and she shared that she'll have to find her telephone number. MSW attempted to contact [employer] for updates on the patient's application submitted by home health. MSW will continue to follow to assist the patient.

## 2020-02-27 ENCOUNTER — OFFICE VISIT (OUTPATIENT)
Dept: FAMILY MEDICINE CLINIC | Age: 70
End: 2020-02-27

## 2020-02-27 VITALS
OXYGEN SATURATION: 99 % | SYSTOLIC BLOOD PRESSURE: 140 MMHG | BODY MASS INDEX: 29.3 KG/M2 | HEART RATE: 67 BPM | TEMPERATURE: 97.5 F | WEIGHT: 155.2 LBS | DIASTOLIC BLOOD PRESSURE: 66 MMHG | HEIGHT: 61 IN | RESPIRATION RATE: 16 BRPM

## 2020-02-27 DIAGNOSIS — E78.00 PURE HYPERCHOLESTEROLEMIA: ICD-10-CM

## 2020-02-27 DIAGNOSIS — E11.9 TYPE 2 DIABETES MELLITUS WITHOUT COMPLICATION, WITHOUT LONG-TERM CURRENT USE OF INSULIN (HCC): ICD-10-CM

## 2020-02-27 DIAGNOSIS — F20.9 SCHIZOPHRENIA, UNSPECIFIED TYPE (HCC): ICD-10-CM

## 2020-02-27 DIAGNOSIS — R82.90 BAD ODOR OF URINE: ICD-10-CM

## 2020-02-27 DIAGNOSIS — E11.42 TYPE 2 DIABETES MELLITUS WITH DIABETIC POLYNEUROPATHY, WITHOUT LONG-TERM CURRENT USE OF INSULIN (HCC): ICD-10-CM

## 2020-02-27 DIAGNOSIS — R29.6 FREQUENT FALLS: ICD-10-CM

## 2020-02-27 DIAGNOSIS — F31.9 BIPOLAR AFFECTIVE DISORDER, REMISSION STATUS UNSPECIFIED (HCC): ICD-10-CM

## 2020-02-27 DIAGNOSIS — E11.21 TYPE 2 DIABETES WITH NEPHROPATHY (HCC): Primary | ICD-10-CM

## 2020-02-27 DIAGNOSIS — N18.30 CKD (CHRONIC KIDNEY DISEASE) STAGE 3, GFR 30-59 ML/MIN (HCC): ICD-10-CM

## 2020-02-27 DIAGNOSIS — F33.9 RECURRENT DEPRESSION (HCC): ICD-10-CM

## 2020-02-27 DIAGNOSIS — D63.8 ANEMIA OF CHRONIC DISEASE: ICD-10-CM

## 2020-02-27 DIAGNOSIS — N32.81 OVERACTIVE BLADDER: ICD-10-CM

## 2020-02-27 DIAGNOSIS — I65.23 BILATERAL CAROTID ARTERY STENOSIS: ICD-10-CM

## 2020-02-27 LAB
BILIRUB UR QL STRIP: NEGATIVE
GLUCOSE UR-MCNC: NORMAL MG/DL
KETONES P FAST UR STRIP-MCNC: NEGATIVE MG/DL
PH UR STRIP: 7 [PH] (ref 4.6–8)
PROT UR QL STRIP: NORMAL
SP GR UR STRIP: 1.01 (ref 1–1.03)
UA UROBILINOGEN AMB POC: NORMAL (ref 0.2–1)
URINALYSIS CLARITY POC: CLEAR
URINALYSIS COLOR POC: NORMAL
URINE BLOOD POC: NEGATIVE
URINE LEUKOCYTES POC: NEGATIVE
URINE NITRITES POC: NEGATIVE

## 2020-02-27 NOTE — PATIENT INSTRUCTIONS

## 2020-02-27 NOTE — PROGRESS NOTES
1. Have you been to the ER, urgent care clinic since your last visit? Hospitalized since your last visit? [facility] ED visits at [facility] 1/16/2020, 2/07/2020 and 2/10/2020    2. Have you seen or consulted any other health care providers outside of the [address] [place] [address] since your last visit? Include any pap smears or colon screening. 2/23/2020 - Dr. Fabienne Kumari. [facility] Endocrinology - LOV: 2/2020 follow-up visit scheduled for 3/18/2020. Patient has an appointment with psychiatrist on 3/17/2020 - Dr. Megan Hdz.     Chief Complaint   Patient presents with    Diabetes    Bipolar    Anemia    Chronic Kidney Disease    Fall    Cholesterol Problem    Overactive Bladder    Headache    Spasms    Urinary Odor    Back Pain    Other     schizophrenia

## 2020-02-28 ENCOUNTER — PATIENT OUTREACH (OUTPATIENT)
Dept: CASE MANAGEMENT | Age: 70
End: 2020-02-28

## 2020-02-28 NOTE — PROGRESS NOTES
MSW received referral from [facility] to assist the patient with resources. The patient resides in the community alone and does have a support system that consist of her family and friend. The patient receives transportation from [address] and sometimes from family. MSW received pt's consent to contact her daughter, to discuss concerns regarding the patient's home condition and support needed. The patient was recently screened for Medicaid and should learn status of her application in 6-8 weeks. MSW continue to research for availability and affordability of senior housing for the patient. MSW contacted the patient today. She continues to be alert and oriented, in high spirits and an active participant in communicating with MSW. Ms Junior Donohue verified name and  as identifiers. She mentioned that she went to her doctor's appointment and stated that she is upset because she was not able to remember some of the questions she wanted to ask. MSW encouraged the patient to write down questions and keep adding to a list she would take with her to her next doctor's visit in order to remember the questions she have. MSW informed the pt that she continues to attempt to contact the [employer] for updates but have not received a returned call. MSW will continue to follow to assist the patient.

## 2020-02-28 NOTE — PROGRESS NOTES
Rio Crooks, 69 y.o.,  female    SUBJECTIVE  Ff-up       NIDDM w/ nephropathy/ neuropathy - Multiple ED visits for hyperglycemia. a1c   6.9>12. 6.  biggest concern is her ability to live independently/med supervision. She is in discussion with  from [place] on assisted living residences /meals on wheels. Currently she is still living by herself, with daughter checking up on her she says. [facility] helping set up reminders. Reviewed endo notes, huber average , due to her dementia, it is unsafe for her to be on insulin at home living alone and discussion on once a week injection with supervision of her daughter Melani Muse. She has transportation services through her insurance     She has chronic anemia thought to be due to anemia of chronic disease. She reports GI work up 2014 normal. She is no longer seeing [facility], and her neprhologist Dr. Gifty Gonzalez has taken over monitoring this. HL- taking lipitor    OAB- taking oxybutynin and responding well to this. GERD- asymptomatic, on protonix. Chronic neck pain with radiculopathy-she is now seeing dr. Courtney Bower, and started her on trial gabapentin. Asthma-doing well she says, on ICS and LAMA for maintenance,  following Dr. Germania August    CHRIS on CPAP- reports consistent use,  following  Dr. Ashleigh Alcantar. Reports spontaneous resolution of headaches    C/o urinary odor, no other symptoms    Deana Lucretia ([phone]) and grand daughter Cuba Murrell ([phone]).  Are assisting with care, there is talk to have her move in with them    Provided's currently:  COPD/CHRIS on CPAP- pulm Dr. Germania August  Memory/ dizzinessNeurology- NP bibealt/ Cardiology- Dr. Jeanine Zheng  Depression/bipolar/ schizophrenia- CPA Janine long  Anemia of chronic disease/ CKD- dr. Jayden Doan   Vertigo- ENT Dr. Carlos Enrique Valerio - upcoming vertigo PT  Cervical DDD- JOLENE        ROS:  See HPI, all others negative    ROS random episodes of chills, no wt loss/appetite changes, no hot flashes/night sweates/ fever    Patient Active Problem List   Diagnosis Code    Pure hypercholesterolemia E78.00    Overactive bladder N32.81    Chronic allergic rhinitis J30.9    Anemia V21.0    Uncomplicated asthma K90.171    Type 2 diabetes mellitus without complication, without long-term current use of insulin ([facility]) E11.9         Current Outpatient Medications:     simethicone (GAS-X PO), Take  by mouth., Disp: , Rfl:     gabapentin (NEURONTIN) 100 mg capsule, Take 1 Cap by mouth three (3) times daily. Max Daily Amount: 300 mg., Disp: 90 Cap, Rfl: 1    oxybutynin (DITROPAN) 5 mg tablet, Take 1 Tab by mouth two (2) times a day. Indications: overactive bladder, Disp: 180 Tab, Rfl: 3    pantoprazole (PROTONIX) 40 mg tablet, Take 1 Tab by mouth daily. TAKE 1 TABLET BY MOUTH ONCE DAILY  Indications: gastroesophageal reflux disease, Disp: 90 Tab, Rfl: 3    atorvastatin (LIPITOR) 10 mg tablet, Take 1 Tab by mouth daily. TAKE 1 TABLET BY MOUTH ONCE DAILY  Indications: high cholesterol, Disp: 90 Tab, Rfl: 3    montelukast (SINGULAIR) 10 mg tablet, Take 1 Tab by mouth daily. Indications: controller medication for asthma, Disp: 90 Tab, Rfl: 3    gabapentin (NEURONTIN) 100 mg capsule, Take 1 Cap by mouth three (3) times daily. Max Daily Amount: 300 mg., Disp: 90 Cap, Rfl: 1    sertraline (ZOLOFT) 100 mg tablet, Take 200 mg by mouth daily. Indications: major depressive disorder, Disp: , Rfl:     acetaminophen (TYLENOL) 500 mg capsule, Take 500 mg by mouth every six (6) hours as needed for Pain (back). , Disp: , Rfl:     olopatadine (PAZEO) 0.7 % drop, Apply 1 Drop to eye daily. both eyes  Indications: Allergic Conjunctivitis, Disp: , Rfl:     sodium chloride (OCEAN) 0.65 % nasal squeeze bottle, 1 [place] by Both Nostrils route as needed for Congestion. , Disp: , Rfl:     fluticasone-vilanterol (BREO ELLIPTA) 200-25 mcg/dose inhaler, Take 1 Puff by inhalation daily.  Indications: Controller Medication for Asthma, Disp: , Rfl:     ipratropium-albuterol (COMBIVENT RESPIMAT)  mcg/actuation inhaler, Take 1 inhalation by mouth Take As Needed daily for asthma, Disp: , Rfl:     buPROPion XL (WELLBUTRIN XL) 150 mg tablet, Take 1 Tab by mouth every morning. Indications: Bipolar Depression, Disp: 30 Tab, Rfl: 0    prednisoLONE acetate (PRED FORTE) 1 % ophthalmic suspension, Administer 1 Drop to both eyes four (4) times daily. Indications: Prevent Inflammation of the Cornea due to the Medication Cytarabine, Disp: , Rfl:     fluticasone (VERAMYST) 27.5 mcg/actuation nasal spray, Spray  in Nose one spray each nostril daily , Disp: , Rfl:     cycloSPORINE (RESTASIS) 0.05 % ophthalmic emulsion, Instill 1 Drop in affected eye(s) Every 12 hours. , Disp: , Rfl:     Allergies   Allergen Reactions    Iodine Swelling    Nabumetone Swelling    Penicillins Unknown (comments)    Shellfish Containing Products Swelling    Sulfa (Sulfonamide Antibiotics) Swelling       Past Medical History:   Diagnosis Date    Allergic rhinitis     Anemia     Asthma     Candida vaginitis     CKD (chronic kidney disease) stage 3, GFR 30-59 ml/min     COPD (chronic obstructive pulmonary disease) (HCC)     Diabetes (HCC)     Diabetes (HCC)     GERD (gastroesophageal reflux disease)     h pylori gastritis egd 11/14 dr Fay Diaz    Headache     Hx of colonoscopy 11/05/2014    normal rpt 10 years, 11/2024, dr. Fay Diaz    Hypercholesterolemia     Mild dementia     OAB (overactive bladder)     CHRIS on CPAP     Radiculopathy     Urinary incontinence     UTI (urinary tract infection)        Social History     Social History    Marital status:      Spouse name: N/A    Number of children: N/A    Years of education: N/A     Occupational History    Not on file.      Social History Main Topics    Smoking status: Former Smoker     Years: 20.00    Smokeless tobacco: Former User     Quit date: 1/1/1999    Alcohol use Yes    Drug use: No    Sexual activity: Not Currently     Partners: Male     Other Topics Concern    Not on file     Social History Narrative       History reviewed. No pertinent family history. OBJECTIVE    Physical Exam:     Visit Vitals  /66 (BP 1 Location: Left arm, BP Patient Position: Sitting)   Pulse 67   Temp 97.5 °F (36.4 °C) (Oral)   Resp 16   Ht 5' 1\" (1.549 m)   Wt 155 lb 3.2 oz (70.4 kg)   SpO2 99%   BMI 29.32 kg/m²       General: alert, AA, in no apparent distress or pain  CVS: normal rate, regular rhythm, distinct S1 and S2  Lungs:clear to ausculation bilaterally, no crackles, wheezing or rhonchi noted  Abdomen: soft, NT, ND  Extremities: No edema, no cyanosis  Psych:  mood and affect normal    Results for orders placed or performed in visit on 02/27/20   AMB POC URINALYSIS DIP STICK AUTO W/O MICRO   Result Value Ref Range    Color (UA POC) Light Yellow     Clarity (UA POC) Clear     Glucose (UA POC) 1+ Negative    Bilirubin (UA POC) Negative Negative    Ketones (UA POC) Negative Negative    Specific gravity (UA POC) 1.015 1.001 - 1.035    Blood (UA POC) Negative Negative    pH (UA POC) 7.0 4.6 - 8.0    Protein (UA POC) Trace Negative    Urobilinogen (UA POC) 0.2 mg/dL 0.2 - 1    Nitrites (UA POC) Negative Negative    Leukocyte esterase (UA POC) Negative Negative       ASSESSMENT/PLAN  Diagnoses and all orders for this visit:   Type 2 diabetes mellitus with neuropathy(HCC)  W/ hyperglycemia  a1c 6.3>6.9>12.9  On gabapentin  On januvia  Following endo, considering once a week insulin with her daughter  Complex case- challenging social situation as she is currently living independently, and needs medication supervision.  Her daughter checks up on her  She is in contact with /[facility]/ meals on wheels  Urged her to move in with daughter in the mean time  She has transportation through her insurance  appt with [facility] 3/18/2020  Will refer to DM educator [place], also will try to reach out with Nurse Navigator Your if there are any other resources for her. Cbc/cmp/a1c in 3 months prior to next visit    Type 2 diabetes with nephropathy ([facility]    Schizophrenia, unspecified type [facility])  Ongoing care with dr. Jani Gonzales CPA appt 3/17/2020    Bipolar affective disorder, remission status unspecified ([facility]    Anemia of chronic disease  Stable  hgb baseline 10's  Consideration of aransep  Now following Dr. Yao Choe nephrology    CKD 3  Avoid nsaids, renally dose medications  Monitoring    Frequent falls  No recent falls. Likely multifactorial: peripheral/DM neuropathy  Her neuro/cards eval was essentially neg. Mild carotid stenosis, chronic cerebral ischemic changes  Advised consistent walker use, fall precautions discussed  Reviewed Dr. Johana Jimenez notes, does not think due to spinal stenosis  Advised to keep appts with neurology for further recs. Bilateral carotid stenosis  Mild  Optimize med management  On statin, asa  Monitoring    Cerebral microvascular disease  Optimize metabolic syndrome mgt     Pure hypercholesterolemia  On lipitor  LDL goal <70  Recheck labs prior to next visit     Overactive bladder  Responding to ditropan, to cont    Chronic bronchitis, unspecified chronic bronchitis type ([facility]  Controlled  On ICS, prn WILLARD/ipatropium  Following pulm dr. Pola Christy    GERD, unspecified esophagitis  Stable  Cont protonix    Urinary odor  Neg UA      I spent greater than 40 minutes with pt with greater than 50% of the face to face time counseling and coordinating care       Ff-up in 3 months, sooner prn    Patient understands plan of care. Patient has provided input and agrees with goals.

## 2020-02-28 NOTE — PROGRESS NOTES
Patient attended appointments with her PCP, Dr Kenzie Ross. on 2/27/20, Nurse Navigator reviewed EMR and will follow up on next scheduled outreach.

## 2020-03-04 ENCOUNTER — PATIENT OUTREACH (OUTPATIENT)
Dept: CASE MANAGEMENT | Age: 70
End: 2020-03-04

## 2020-03-05 ENCOUNTER — PATIENT OUTREACH (OUTPATIENT)
Dept: CASE MANAGEMENT | Age: 70
End: 2020-03-05

## 2020-03-05 NOTE — PROGRESS NOTES
MSW received referral from [facility] to assist the patient with resources of [employer] on wheels and during conversation with patient she informed of her interest in  affordable housing. MSW contacted the [employer] on [address] for updates on status of the patient's application and was informed that they do not have an application for the patient. The worker informed MSW that they request for $75.00 at the time of turning in application. The meals cost $7.50 each and the $75.00 will cover the charges for the first 10 meals provided. The patient will be billed monthly for meals delivered to the home post the first 10 meals. There are no weekend deliveries and they provide two meals daily. MSW requested for the representative to mail application to the patient's home address. MSW will update the participant with information obtained.

## 2020-03-06 ENCOUNTER — PATIENT OUTREACH (OUTPATIENT)
Dept: CASE MANAGEMENT | Age: 70
End: 2020-03-06

## 2020-03-06 NOTE — PROGRESS NOTES
MSW received referral from [facility] to assist the patient with resources of meals on wheels and during conversation with patient she informed of her interest in affordable housing due to the poor maintenance of the home she currently resides in. MSW contacted the patient. MSW introduced self, role and reason for call. The patient verified her name and  as identifiers. MSW informed the patient on updates received from [employer]. Ms Hernan Yang shared that she would not be able to afford the $75.00 and the monthly payments because she is focused on paying down her other personal bills she has accrued. She indicated that she is not interested in the [employer] at this time. MSW updated the patient on apartment complex and the patient continues to express that she prefers a 2 bedroom apartment for the space and to be able to accommodate her belongings. The patient informed that she may be able to afford an apartment with monthly rent of $700.00 preferably with most utilities included. She mentioned that her daughter has not communicated with her. MSW  shared that she will continue to research affordable apartment for her. MSW will continue to assist the patient.

## 2020-03-10 ENCOUNTER — TELEPHONE (OUTPATIENT)
Dept: INTERNAL MEDICINE CLINIC | Age: 70
End: 2020-03-10

## 2020-03-10 NOTE — TELEPHONE ENCOUNTER
Called patient to follow up since she did not come to scheduled PharmD appointment today. No answer, left voicemail requesting patient return call to PharmD. Will continue to attempt to reach patient.     August Kirk, PharmD  Clinical Pharmacist Specialist

## 2020-03-11 ENCOUNTER — TELEPHONE (OUTPATIENT)
Dept: INTERNAL MEDICINE CLINIC | Age: 70
End: 2020-03-11

## 2020-03-11 NOTE — TELEPHONE ENCOUNTER
Returned call to patient, went straight to voicemail. Left message on patient's phone requesting callback to PharmD. Called alternate phone number and spoke with neighbor. Left non-specific message requesting patient return call.     Blanco Suárez, YahirD  Clinical Pharmacist Specialist

## 2020-03-11 NOTE — TELEPHONE ENCOUNTER
Pt missed her appt 03/11/20 due to transportation - did not show up - she left her phone at a store and was unable to call to r/s she is asking to r/s her appt and also leaving an  Alternat phone Number  [phone] -(neighbor) if she is unable to get her phone back today . please call patients number first .

## 2020-03-12 ENCOUNTER — TELEPHONE (OUTPATIENT)
Dept: INTERNAL MEDICINE CLINIC | Age: 70
End: 2020-03-12

## 2020-03-12 NOTE — TELEPHONE ENCOUNTER
Called patient. No answer, left voicemail requesting patient return call to PharmD. Will continue to attempt to reach patient.     Shaka Vidal, PharmD  Clinical Pharmacist Specialist

## 2020-03-12 NOTE — TELEPHONE ENCOUNTER
Pt calling asking to speak to [facility] about rescheduling her appt. Says she missed due to transportation issues. Wants to discuss options since it is hard for her to get to appointments.

## 2020-03-13 ENCOUNTER — TELEPHONE (OUTPATIENT)
Dept: FAMILY MEDICINE CLINIC | Age: 70
End: 2020-03-13

## 2020-03-13 ENCOUNTER — PATIENT OUTREACH (OUTPATIENT)
Dept: CASE MANAGEMENT | Age: 70
End: 2020-03-13

## 2020-03-13 NOTE — PROGRESS NOTES
MSW received referral from [facility] to assist the patient with resources of meals on wheels and during conversation with patient she informed of her interest in affordable housing due to the poor maintenance of the home she currently resides in. Phone call received from Ms Anita Arellano who verified her name and  as identifiers. She informed MSW that she is calling because she had left her phone in the store and wasn't sure if MSW had attempted to contact her during the time that she didn't have her phone. She stated that she was able to retrieve the phone. Ms Anita Arellano stated that she is doing okay and is still wanting to find an apartment. MSW informed her that she is still researching on affordable housing for her. The patient reported that she is only interested in housing in [place] [address] (close to [facility]). MSW made the patient aware that she has not been able to locate housing with her specifications of size and cost. The patient mentioned that her grand-daughter is assisting her with finding a house she can rent and the care team planner from [place] [address] is also assisting her with resources. She was firm on not interested in group home placement. Patient informed MSW that she was unable to attend her recent doctor's appointment due to transportation. She shared that because of not having a phone, she missed transportation's call informing her that they will be early to pick her up. She mentioned that when they arrived, she was unaware and was told by the neighbor that they knocked on the patient's door but no one answered. She was informed by the transportation company that because she missed 3 trips, they will be unable to continue providing her with transportation. She shared that the care team planner at [place] [address] contacted the company and was able to explain the situation to them, and they have decided to resume her transportation for her future appointments, per Ms Anita Arellano.         MSW will continue to research affordable apartment options for the patient.

## 2020-03-13 NOTE — TELEPHONE ENCOUNTER
Pt called and would like to discuss her medical mental diagnosis. Please call pt at your earliest convenience.

## 2020-03-13 NOTE — TELEPHONE ENCOUNTER
Patient identified with 2 identifiers (name and ). Patient states she going to see her psych doctor soon. Patient can not recall her name. Just letting us know that she wiill be following up.

## 2020-03-30 ENCOUNTER — TELEPHONE (OUTPATIENT)
Dept: FAMILY MEDICINE CLINIC | Age: 70
End: 2020-03-30

## 2020-03-30 NOTE — TELEPHONE ENCOUNTER
She is currently testing up to 3 times a day depending on blood sugars. So they will likely send in request for new script. Wants a medical alert \"bracelet\" for her conditions. Wants to know if Dr. Gerardo Vásquez can help her obtain this. She is nervous with everything going on right now.

## 2020-03-31 ENCOUNTER — PATIENT OUTREACH (OUTPATIENT)
Dept: CASE MANAGEMENT | Age: 70
End: 2020-03-31

## 2020-03-31 NOTE — PROGRESS NOTES
MSW received referral from [facility] to assist the patient with resources of meals on wheels and during conversation with patient she informed of her interest in affordable housing due to the poor maintenance of the home she currently resides in. MSW contacted Ms Rashad Boothe and verified name and  as identifiers. MSW introduced self, role and reason for call. The patient is alert and oriented and in high spirits during conversation. The patient shared that she was notified by [employer] that they will conduct phone interview with her tomorrow. She reported that she is still interested in moving. MSW provided a list of affordable apartments to the patient. She shared that calling the apartment complex would give her something to do while she is in the house. Ms Rashad Boothe mentioned that her daughter contacted her which made her happy. MSW will continue to assist the patient as needed.

## 2020-04-03 ENCOUNTER — TELEPHONE (OUTPATIENT)
Dept: INTERNAL MEDICINE CLINIC | Age: 70
End: 2020-04-03

## 2020-04-03 NOTE — TELEPHONE ENCOUNTER
Pharmacy Progress Note - Telephone Encounter    S/O: Ms. Sumeet Ahumada 68 y.o. female contacted via an outbound telephone call to discuss upcoming PharmD appointment scheduled for 2020 today. Verified patients identifiers (name & ) per HIPAA policy. A/P:  - Patient agreed to change appointment to telephone appointment on 2020 at 1 PM; instructed patient to have all medications and BG readings ready for appointment. - Patient endorses understanding to the provided information. All questions answered at this time.        Thank you,  Jake Dobbs, PharmD  Clinical Pharmacist Specialist

## 2020-04-07 ENCOUNTER — TELEPHONE (OUTPATIENT)
Dept: ORTHOPEDIC SURGERY | Age: 70
End: 2020-04-07

## 2020-04-07 ENCOUNTER — VIRTUAL VISIT (OUTPATIENT)
Dept: INTERNAL MEDICINE CLINIC | Age: 70
End: 2020-04-07

## 2020-04-07 ENCOUNTER — PATIENT OUTREACH (OUTPATIENT)
Dept: CASE MANAGEMENT | Age: 70
End: 2020-04-07

## 2020-04-07 DIAGNOSIS — M54.12 CERVICAL NEURITIS: ICD-10-CM

## 2020-04-07 DIAGNOSIS — M48.02 CERVICAL SPINAL STENOSIS: ICD-10-CM

## 2020-04-07 DIAGNOSIS — E11.21 TYPE 2 DIABETES WITH NEPHROPATHY (HCC): Primary | ICD-10-CM

## 2020-04-07 DIAGNOSIS — M50.30 DDD (DEGENERATIVE DISC DISEASE), CERVICAL: ICD-10-CM

## 2020-04-07 RX ORDER — SITAGLIPTIN 50 MG/1
1 TABLET, FILM COATED ORAL DAILY
COMMUNITY
Start: 2020-03-18 | End: 2020-06-01 | Stop reason: SDUPTHER

## 2020-04-07 RX ORDER — TEMAZEPAM 15 MG/1
1 CAPSULE ORAL
COMMUNITY
Start: 2020-03-27 | End: 2021-01-22 | Stop reason: ALTCHOICE

## 2020-04-07 RX ORDER — BUPROPION HYDROCHLORIDE 75 MG/1
1 TABLET ORAL DAILY
COMMUNITY
Start: 2020-03-17 | End: 2020-06-01

## 2020-04-07 NOTE — PROGRESS NOTES
CC:  Diabetes management - telephone encounter    S/O: Clark Whyte is a 68 y.o. female referred by Moise Leigh MD for diabetes management. Past Medical History:   Diagnosis Date    Allergic rhinitis     Anemia     Asthma     Candida vaginitis     CKD (chronic kidney disease) stage 3, GFR 30-59 ml/min (HCC)     COPD (chronic obstructive pulmonary disease) (HCC)     Diabetes (HCC)     Diabetes (HCC)     GERD (gastroesophageal reflux disease)     h pylori gastritis egd 11/14 dr Jayne Valdes    Headache     Hx of colonoscopy 11/05/2014    normal rpt 10 years, 11/2024, dr. Kaila Gagnon Hypercholesterolemia     Mild dementia ([facility] [address]     OAB (overactive bladder)     CHRIS on CPAP     Radiculopathy     Urinary incontinence     UTI (urinary tract infection)      Current diabetes regimen consists of the following: Januvia 50 mg daily. Patient reports the following signs/symptoms of diabetes: polyuria/dipsia/phagia. Denies any hypoglycemia symptoms recently.  -States she had BG of 500s over past month, not sure why BG got that high    Patient checks blood sugars 2-3 times per day and readings run 100s-230s mg/dL. Most recent A1C was 12.3 %. Lab Results   Component Value Date/Time    Hemoglobin A1c 12.3 (H) 02/24/2020 11:41 AM    Hemoglobin A1c (POC) 6.3 08/15/2019 11:49 AM   -States she went to the ER 3 times over the past month  -States she was using [place] (\"patch on top of my arm\"); checks BG using finger pricks at home  -States she is not getting help at home with medications - \"nobody wants me in the house and nobody helps me\"    Date Fasting Before Lunch Before Dinner Bedtime   4/5 158 (1 hour after had juice) 153     4/4 175   128   4/3  236  113   4/2  114     4/1  128     3/30  155 and 145     3/29 156   100     Diet:  -States her daughter has her getting meals on wheels  -States she doesn't cook, uses microwave a lot    Patient denies adherence with Her medications.  Patient reports adverse effects from their medications - states has some nausea when taking her medicines. -States she didn't take medication today yet, missed dose on 3/25  -States she is taking Temazepam 7.5 mg once daily and bupropion 75 mg once daily from her psychiatrist Dr. Grover Johnson temazepam is not really helping with her sleep  -States she has been off Zoloft per her psychiatrist Dr. Grover Johnson ran out of gabapentin; [facility] by [facility]  -States only eye drops she is using is Restasis  -States she needs someone to come and help her at home  -States a nurse from [place] [id number] was helping her after discharge with checking her BG and reminding her to take her medications  -States her nurse's name is Russel Lewis from [address] [place] [address]   -States telephone number is [phone]  -[facility] nurse calls her once a week  -States had nosebleed on Friday - called doctor office to follow up, hasn't had any nosebleed since then    -Calculated CrCl using adjBW (125 lb) = 30.8 mL/min    Past Surgical History:   Procedure Laterality Date    HX COLONOSCOPY  11/05/2014    Normal/Repeat in 10 years/ 18 [facility] HX ORTHOPAEDIC Right 02/2012    knee replacement      No family history on file. Social History     Socioeconomic History    Marital status:      Spouse name: Not on file    Number of children: Not on file    Years of education: Not on file    Highest education level: Not on file   Tobacco Use    Smoking status: Former Smoker     Years: 20.00    Smokeless tobacco: Former User     Quit date: 1/1/1999   Substance and Sexual Activity    Alcohol use: Yes    Drug use: No    Sexual activity: Not Currently     Partners: Male     Allergies   Allergen Reactions    Aspirin Other (comments)    Flexeril [Cyclobenzaprine] Other (comments)     Patient states not allergic to this medication.     Iodine Swelling    Nabumetone Swelling    Penicillins Unknown (comments)     Other reaction(s): Other (see comments)    Shellfish Containing Products Swelling    Sulfa (Sulfonamide Antibiotics) Swelling    Tramadol Itching     Current Outpatient Medications   Medication Sig    cyanocobalamin (Vitamin B-12) 1,000 mcg tablet Take 1,000 mcg by mouth daily.  bisacodyL 5 mg tab Take 1 Tab by mouth daily.  temazepam (RESTORIL) 7.5 mg capsule Take 1 Cap by mouth nightly.  Januvia 50 mg tablet Take 1 Tab by mouth daily.  buPROPion (WELLBUTRIN) 75 mg tablet Take 1 Tab by mouth daily.  simethicone (GAS-X PO) Take 250 mg by mouth daily.  oxybutynin (DITROPAN) 5 mg tablet Take 1 Tab by mouth two (2) times a day. Indications: overactive bladder    pantoprazole (PROTONIX) 40 mg tablet Take 1 Tab by mouth daily. TAKE 1 TABLET BY MOUTH ONCE DAILY  Indications: gastroesophageal reflux disease    atorvastatin (LIPITOR) 10 mg tablet Take 1 Tab by mouth daily. TAKE 1 TABLET BY MOUTH ONCE DAILY  Indications: high cholesterol    montelukast (SINGULAIR) 10 mg tablet Take 1 Tab by mouth daily. Indications: controller medication for asthma    acetaminophen (TYLENOL) 500 mg capsule Take 1,000 mg by mouth every six (6) hours as needed for Pain (back).  sodium chloride (OCEAN) 0.65 % nasal squeeze bottle 1 [place] by Both Nostrils route as needed for Congestion.  fluticasone-vilanterol (BREO ELLIPTA) 200-25 mcg/dose inhaler Take 1 Puff by inhalation daily. Indications: Controller Medication for Asthma    cycloSPORINE (RESTASIS) 0.05 % ophthalmic emulsion Instill 1 Drop in affected eye(s) Every 12 hours.  ipratropium-albuterol (COMBIVENT RESPIMAT)  mcg/actuation inhaler Take 1 inhalation by mouth Take As Needed daily for asthma    gabapentin (NEURONTIN) 100 mg capsule Take 1 Cap by mouth three (3) times daily. Max Daily Amount: 300 mg.  sertraline (ZOLOFT) 100 mg tablet Take 200 mg by mouth daily.  Indications: major depressive disorder    olopatadine (PAZEO) 0.7 % drop Apply 1 Drop to eye daily. both eyes  Indications: Allergic Conjunctivitis    prednisoLONE acetate (PRED FORTE) 1 % ophthalmic suspension Administer 1 Drop to both eyes four (4) times daily. Indications: Prevent Inflammation of the Cornea due to the Medication Cytarabine    fluticasone (VERAMYST) 27.5 mcg/actuation nasal spray Parker  in Nose one spray each nostril daily      No current facility-administered medications for this visit. There were no vitals taken for this visit. Lab Results   Component Value Date/Time    Sodium 144 02/24/2020 11:41 AM    Potassium 3.9 02/24/2020 11:41 AM    Chloride 110 02/24/2020 11:41 AM    CO2 30 02/24/2020 11:41 AM    Anion gap 4 02/24/2020 11:41 AM    Glucose 180 (H) 02/24/2020 11:41 AM    BUN 15 02/24/2020 11:41 AM    Creatinine 1.50 (H) 02/24/2020 11:41 AM    BUN/Creatinine ratio 10 (L) 02/24/2020 11:41 AM    GFR est AA 41 (L) 02/24/2020 11:41 AM    GFR est non-AA 34 (L) 02/24/2020 11:41 AM    Calcium 9.2 02/24/2020 11:41 AM      Lab Results   Component Value Date/Time    Creatinine 1.50 (H) 02/24/2020 11:41 AM     Lab Results   Component Value Date/Time    Cholesterol, total 193 02/24/2020 11:41 AM    HDL Cholesterol 95 (H) 02/24/2020 11:41 AM    LDL, calculated 81.8 02/24/2020 11:41 AM    VLDL, calculated 16.2 02/24/2020 11:41 AM    Triglyceride 81 02/24/2020 11:41 AM    CHOL/HDL Ratio 2.0 02/24/2020 11:41 AM     Lab Results   Component Value Date/Time    ALT (SGPT) 20 02/24/2020 11:41 AM    AST (SGOT) 19 02/24/2020 11:41 AM    Alk.  phosphatase 74 02/24/2020 11:41 AM    Bilirubin, total 0.3 02/24/2020 11:41 AM     Lab Results   Component Value Date/Time    Microalbumin/Creat ratio (mg/g creat) 106 (H) 02/24/2020 11:41 AM    Microalbumin,urine random 5.08 (H) 02/24/2020 11:41 AM     BP Readings from Last 3 Encounters:   02/27/20 140/66   12/20/19 119/80   11/26/19 110/78     Wt Readings from Last 3 Encounters:   02/27/20 155 lb 3.2 oz (70.4 kg) 12/20/19 151 lb (68.5 kg)   11/26/19 147 lb (66.7 kg)     Estimated body mass index is 29.32 kg/m² as calculated from the following:    Height as of 2/27/20: 5' 1\" (1.549 m). Weight as of 2/27/20: 155 lb 3.2 oz (70.4 kg). Screenings:  Diabetic Foot and Eye Exam HM Status   Topic Date Due    Diabetic Foot Care  04/10/2019    Eye Exam  01/23/2022     Assessment/Plan:    1. Diabetes: uncontrolled with goal A1C at least <7%. Blood glucose readings: high with 230s readings the highest consistently per her report.  -Discussed A1C and BG goals with patient today    A. Medications: Stressed importance of medication adherence on overall diabetes control as well as with preventing complications of diabetes. Reviewed symptoms of hypoglycemia and how to treat. Instructed patient to continue to monitor blood sugars 1-2 times daily and call the office if: she has symptoms of hypoglycemia, BG <70, or elevated BG  -Continue Januvia 50 mg daily  -Concerns with social issues regarding patient's care; patient lives alone and states she needs help managing medications   -Gets medications through [facility] but self-administers doses at home  -Will discuss with PCP and route note to  Lois   -Consider option such as GLP-1 agonist to help with patient's BG control  -Will reach out to [address] [place] [address] patient has reported speaking with regarding patient's care    B. Diet/lifestyle: Discussed importance of cutting down on carbs  -Will focus more on diabetic diet during patient's follow up appointment    C. Screenings/Prevention:  Vaccinations: Patient is eligible for the following vaccinations: none  Dilated eye exam (recommended at least every 2 years or annually if retinopathy present): next due 1/23/22    Albumin-to creatinine ratio (recommended annually): next due 2/24/21  Foot Exam (recommended annually): due    2. Medication reconciliation was completed during the visit.   Medications Discontinued During This Encounter   Medication Reason    buPROPion XL (WELLBUTRIN XL) 150 mg tablet Dose Adjustment    gabapentin (NEURONTIN) 903 mg capsule Duplicate Order     Patient verbalized understanding of the information presented and all of the patients questions were answered. AVS was handed to the patient and information reviewed. Patient advised to call the office with any additional questions or concerns. Follow-up: 2 weeks telephone call. Notification of recommendations will be sent to Dr. Shirlene Méndez MD for review.     Thank you for the consult,  Sola Shabazz, YahirD  Clinical Pharmacist Specialist    Time spent with the patient:  60 mins  Time spent documentin mins    CLINICAL PHARMACY CONSULT: MED RECONCILIATION/REVIEW [id number] Only    PHSO: PHSO Patient?: Yes  Total # of Interventions Recommended: Count: 1 - diabetes teaching  Total Interventions Accepted: 1  Time Spent (min): [facility], PharmD

## 2020-04-07 NOTE — PROGRESS NOTES
MSW received referral from [facility] to assist the patient with resources of meals on wheels and during conversation with patient she informed of her interest in affordable housing due to the poor maintenance of the home she currently resides in.     MSW attempted to contact Ms Trae Posey. Call went to voicemail, MSW introduced self, role and reason for call. MSW will continue to follow to assist as needed.

## 2020-04-07 NOTE — TELEPHONE ENCOUNTER
She was to FU with Dr. Williams Kaplan. Can we offer her a VV to discuss her gabapentin.  Last seen in Dec

## 2020-04-07 NOTE — TELEPHONE ENCOUNTER
Patient is following up on rx for Gabapentin - her [facility] pharmacist is asking if we are continuing this medication for her. If so, please call [facility] pharmacist to advise at [phone] and they will include with her meds going out on 4/18.

## 2020-04-07 NOTE — Clinical Note
Chavez Eduardo,  I spoke with Ms. Shanon Walker today. I did med rec and followed up on her BG. She reports checking BG 2-3 times per day and BG running 100s-230s. She stated several times during the call that she needs help at home managing her care. She states she gets a call once a week from a nurse at [facility]. I will reach out to that nurse to touch base about Ms. Ari valdivia. I was also hoping our  Connor Rouse could continue to follow up with her regarding resources for help with her care. If she was able to get consistent care at home in the future, we could consider options such as a GLP-1 agonist to help with BG control. I am calling her again in 2 weeks to follow up. Please let me know what recommendations you have.   Best, Albin Prader, PharmD Clinical Pharmacist Specialist

## 2020-04-09 ENCOUNTER — PATIENT OUTREACH (OUTPATIENT)
Dept: CASE MANAGEMENT | Age: 70
End: 2020-04-09

## 2020-04-09 NOTE — PROGRESS NOTES
Ms Talitha Mcburney returned call to MSW. She is alert, oriented and in high spirits. Ms Talitha Mcburney informed that she have contacted several leasing offices regarding vacancies and prices. She conveyed that at this time she does not have anyone to assist her with packing and cannot afford to pay someone to help her. She feels that this may not be the right time for her to move because things are not falling in the place just yet. She informed MSW that when the time is right God will put things in place for her. She is still seeking assistance in her home and is awaiting the results from the [address] [place] [address] on her Medicaid application. MSW encouraged her to contact [facility] to inquire about her status. MSW will continue to assist the patient as needed.

## 2020-04-09 NOTE — PROGRESS NOTES
MSW received referral from [facility] to assist the patient with community resources. MSW contacted Ms Lillian Downing who verified her name and  as identifiers. MSW introduced self, role and gave reason for call. Ms Lillian Downing was pleasantly engaged in conversation with MSW. She informed MSW that she contacted the [facility] on last Friday for updates on her Medicaid status and she informed that it is still in process. She also mentioned that she had to submit some documents needed a week ago. The patient shared that she was receiving personal care services in the past but was not satisfied with the staff provided. She indicates not being pleased with having someone preparing her meals or doing extensive cleaning in her home. The agency has not been able to provide her with staffing in over a year. MSW will continue to follow to assist as needed.

## 2020-04-15 ENCOUNTER — PATIENT OUTREACH (OUTPATIENT)
Dept: CASE MANAGEMENT | Age: 70
End: 2020-04-15

## 2020-04-15 NOTE — PROGRESS NOTES
MSW received referral from [facility] to assist the patient with community resources.        MSW contacted Ms Shante Fulton who verified her name and  as identifiers. MSW introduced self, role and gave reason for call. Ms Shante Fulton was pleasantly engaged in conversation with MSW. Ms Shante Fulton informed MSW that she received the documents she sent to the [facility]. She mentioned that she had requested for them to send back the documents because she was not able to keep a copy for herself. The patient is unsure of her Medicaid status at this time and gave consent to MSW to contact the [address] [place] [address] to obtain information. Dept of  will not provide information without the patient verbal consent at the time of call. MSW informed the patient and requested for the patient to contact for status updates. She verbalized understating and stated that she will contact the [address] [place] [address]. MSW will follow to assist as needed.

## 2020-04-17 ENCOUNTER — VIRTUAL VISIT (OUTPATIENT)
Dept: ORTHOPEDIC SURGERY | Age: 70
End: 2020-04-17

## 2020-04-17 DIAGNOSIS — M48.02 CERVICAL SPINAL STENOSIS: Primary | ICD-10-CM

## 2020-04-17 DIAGNOSIS — M50.30 DDD (DEGENERATIVE DISC DISEASE), CERVICAL: ICD-10-CM

## 2020-04-17 DIAGNOSIS — M54.12 CERVICAL NEURITIS: ICD-10-CM

## 2020-04-17 RX ORDER — GABAPENTIN 100 MG/1
100 CAPSULE ORAL 3 TIMES DAILY
Qty: 90 CAP | Refills: 1 | Status: SHIPPED | OUTPATIENT
Start: 2020-04-17 | End: 2020-04-21 | Stop reason: SDUPTHER

## 2020-04-17 NOTE — PROGRESS NOTES
[facility] SPECIAL[facility]  [address], [place]   Phone: [phone]  Fax: [phone]        PROGRESS NOTE    CONSENT:  Pursuant to the emergency declaration under the [address] and [facility] 1135 waiver authority and the [employer] Act, this Virtual Visit was conducted, with patient's consent, to reduce the patient's risk of exposure to COVID-19 and provide continuity of care for an established patient. Services were unable to be provided through a video synchronous discussion virtually to substitute for in-person appointment. Subsequently, the patient was consulted through a telephone discussion. ENCOUNTER DURATION (minutes): 15      HISTORY OF PRESENT ILLNESS:  The patient is a 68 y.o. female and was consulted via telephone at the [facility] office for follow up of neck pain following fall at work in 1999. She reports progressive, constant falls, at least 1 fall per day x 1 year. Her most recent fall was in December 2019. Pt has diffuse, widespread, pain complaint. She denies h/o spinal surgery. Pt reports improvement due to PT. Pt recalls spinal injections through pain management years ago. Pt reports she was receiving trigger injections through Dr. Deirdre Loza. She reports Dr. Deirdre Loza discharged her two months ago due to improvement in pain and falls. Treated with Norco and Flexeril with benefit. Allergic to Flexeril, Nabumetone, Tramadol, and contrast. Pt is not a candidate for CYMBALTA secondary to Wellbutrin. Pt is a poor historian. Pt denies fever, weight loss, or skin changes. The patient is RHD. PmHx overactive bladder, DM, anemia, stage 3 CKD (Dr. Stella Gurrola), bipolar disorder (not followed by a psychiatrist), right knee replacement (2012), neuropathy. Dr. Stella Gurrola defined her maximum safe dose of Neurontin is 700 mg TID.   Note from Aliza Baez MD dated 8/15/19 indicating patient was seen with c/o multiple falls. Feels dizzy at times with unsteady gait. Week prior had fallen backwards. Worsening pain in the entire back. Chronic x several years. She additionally has a right foot drop and uses canes for ambulation. She has had a neuro workup. Chronic ischemic changes noted by MRI. Pt has a home health nurse. Treating with Norco. Referred to nephrology. Recent A1c was 6.3, date unknown. Referred to me for neck pain. Cervical spine CT dated 3/16/17 films not available for my review. Per report,No evidence of fracture or acute traumatic subluxation involving the cervical spine. Reversal of the usual cervical lordosis, unchanged from prior radiographs. Multilevel cervical spondylosis, most pronounced C4-C7. Mild to moderate osseous canal stenosis at C5-C6. Multilevel uncovertebral and facet joint osteoarthritis with neuroforaminal narrowing as above. Enlarged thyroid gland. Correlation with prior ultrasound imaging of the thyroid is recommended. If no prior imaging is available, consideration may be given to a nonemergent thyroid ultrasound. Cervical spine XR dated 8/15/19 reviewed. Per report, Multilevel marked degenerative disc disease mid to lower cervical spine. Thoracic spine XR dated 8/15/19 films not available for my review. Per report,Very subtle scoliosis, as described.  Mild spondylitic degenerative change with endplate osteophytes at several levels in the midthoracic spine without other significant bony abnormality. Lumbar spine XR dated 8/15/19 films not available for my review. Per report, No acute bony abnormality is seen. Mild anterior spondylolisthesis L4-5.  Possible facet arthropathy at L4-5 and L5-S1.  No other significant bony abnormality is seen. Cervical spine MRI dated 11/19/19 reviewed. Per report, Overall straightening of the normal cervical lordosis with gradual C4-C6 kyphosis and multilevel degenerative disc disease.  Associated cervical cord impingement, moderate at C4-C5, moderate at C5-C6 and minimal at C6-C7. Facet arthropathy and uncovertebral proliferative changes cause several high-grade foraminal stenoses - severe on the right at C4-C5, moderately severe on the right and severe on the left at C5-C6 and C6-C7. At her last clinical appointment, patient had reported several falls indicated 1 fall since last OV. Based on her cervical MRI, I could not appreciate spinal pathology to account for her falls. I recommended she follow up with her neurologist, but she could not recall who her neurologist was at the time. I checked with Supriya Roe MD to determine her neurologist. Patient was to continue on Neurontin at the current dose, as it was too early to assess the full benefits of this dose or increase the dose at the time. Patient was to complete physical therapy as prescribed and perform her HEP daily following that.      At today's video consultation, the patient c/o neck pain radiating into the right shoulder. She rates her pain 0-8/10, previously 7/10. Additionally, she endorses joint pain at the hips and knees. She denies any falls since last office visit. She has treated with Tylenol 1 tab TID with some relief. She was last seen by me on 12/20/19 when I set her up for a BUE EMG and patient failed to follow up. Pt is a poor historian. She did not follow through with the BUE EMG, and is unable to confirm whether or not she is followed by Dr. Jonny Peres, neurology. She recalls tolerating the Neurontin 100 mg TID with benefit, but ran out of her medication 1 month ago. Therapy notes reviewed. She has completed PT and is compliant with her HEP noting benefit.  reviewed. There is no height or weight on file to calculate BMI.     PCP: Supriya Roe MD      Past Medical History:   Diagnosis Date    Allergic rhinitis     Anemia     Asthma     Candida vaginitis     CKD (chronic kidney disease) stage 3, GFR 30-59 ml/min (HCC)     COPD (chronic obstructive pulmonary disease) ([facility]     Diabetes ([facility]     Diabetes ([facility]     GERD (gastroesophageal reflux disease)     h pylori gastritis egd 11/14 dr Richard Lynch    Headache     Hx of colonoscopy 11/05/2014    normal rpt 10 years, 11/2024, dr. Richard Lynch    Hypercholesterolemia     Mild dementia ([facility]     OAB (overactive bladder)     CHRIS on CPAP     Radiculopathy     Urinary incontinence     UTI (urinary tract infection)         Social History     Socioeconomic History    Marital status:      Spouse name: Not on file    Number of children: Not on file    Years of education: Not on file    Highest education level: Not on file   Occupational History    Not on file   Social Needs    Financial resource strain: Not on file    Food insecurity     Worry: Not on file     Inability: Not on file    Transportation needs     Medical: Not on file     Non-medical: Not on file   Tobacco Use    Smoking status: Former Smoker     Years: 20.00    Smokeless tobacco: Former User     Quit date: 1/1/1999   Substance and Sexual Activity    Alcohol use:  Yes    Drug use: No    Sexual activity: Not Currently     Partners: Male   Lifestyle    Physical activity     Days per week: Not on file     Minutes per session: Not on file    Stress: Not on file   Relationships    Social connections     Talks on phone: Not on file     Gets together: Not on file     Attends Jewish service: Not on file     Active member of club or organization: Not on file     Attends meetings of clubs or organizations: Not on file     Relationship status: Not on file    Intimate partner violence     Fear of current or ex partner: Not on file     Emotionally abused: Not on file     Physically abused: Not on file     Forced sexual activity: Not on file   Other Topics Concern    Not on file   Social History Narrative    Not on file       Current Outpatient Medications   Medication Sig Dispense Refill    cyanocobalamin (Vitamin B-12) 1,000 mcg tablet Take 1,000 mcg by mouth daily.  Januvia 50 mg tablet Take 1 Tab by mouth daily.  buPROPion (WELLBUTRIN) 75 mg tablet Take 1 Tab by mouth daily.  simethicone (GAS-X PO) Take 250 mg by mouth daily.  oxybutynin (DITROPAN) 5 mg tablet Take 1 Tab by mouth two (2) times a day. Indications: overactive bladder 180 Tab 3    pantoprazole (PROTONIX) 40 mg tablet Take 1 Tab by mouth daily. TAKE 1 TABLET BY MOUTH ONCE DAILY  Indications: gastroesophageal reflux disease 90 Tab 3    atorvastatin (LIPITOR) 10 mg tablet Take 1 Tab by mouth daily. TAKE 1 TABLET BY MOUTH ONCE DAILY  Indications: high cholesterol 90 Tab 3    montelukast (SINGULAIR) 10 mg tablet Take 1 Tab by mouth daily. Indications: controller medication for asthma 90 Tab 3    acetaminophen (TYLENOL) 500 mg capsule Take 1,000 mg by mouth every six (6) hours as needed for Pain (back).  fluticasone-vilanterol (BREO ELLIPTA) 200-25 mcg/dose inhaler Take 1 Puff by inhalation daily. Indications: Controller Medication for Asthma      cycloSPORINE (RESTASIS) 0.05 % ophthalmic emulsion Instill 1 Drop in affected eye(s) Every 12 hours.  bisacodyL 5 mg tab Take 1 Tab by mouth daily.  temazepam (RESTORIL) 7.5 mg capsule Take 1 Cap by mouth nightly.  gabapentin (NEURONTIN) 100 mg capsule Take 1 Cap by mouth three (3) times daily. Max Daily Amount: 300 mg. 90 Cap 1    sertraline (ZOLOFT) 100 mg tablet Take 200 mg by mouth daily. Indications: major depressive disorder      olopatadine (PAZEO) 0.7 % drop Apply 1 Drop to eye daily. both eyes  Indications: Allergic Conjunctivitis      sodium chloride (OCEAN) 0.65 % nasal squeeze bottle 1 [place] by Both Nostrils route as needed for Congestion.  prednisoLONE acetate (PRED FORTE) 1 % ophthalmic suspension Administer 1 Drop to both eyes four (4) times daily.  Indications: Prevent Inflammation of the Cornea due to the Medication Cytarabine      fluticasone (VERAMYST) 27.5 mcg/actuation nasal spray Spray  in Nose one spray each nostril daily       ipratropium-albuterol (COMBIVENT RESPIMAT)  mcg/actuation inhaler Take 1 inhalation by mouth Take As Needed daily for asthma         Allergies   Allergen Reactions    Aspirin Other (comments)    Flexeril [Cyclobenzaprine] Other (comments)     Patient states not allergic to this medication.  Iodine Swelling    Nabumetone Swelling    Penicillins Unknown (comments)     Other reaction(s): Other (see comments)    Shellfish Containing Products Swelling    Sulfa (Sulfonamide Antibiotics) Swelling    Tramadol Itching      PHYSICAL EXAMINATION  Unable to perform examination secondary to COVID-19. CONSTITUTIONAL: NAD, A&O x 3    ASSESSMENT   Diagnoses and all orders for this visit:    1. Cervical spinal stenosis  -     gabapentin (Neurontin) 100 mg capsule; Take 1 Cap by mouth three (3) times daily. Max Daily Amount: 300 mg.    2. Cervical neuritis  -     gabapentin (Neurontin) 100 mg capsule; Take 1 Cap by mouth three (3) times daily. Max Daily Amount: 300 mg.    3. DDD (degenerative disc disease), cervical  -     gabapentin (Neurontin) 100 mg capsule; Take 1 Cap by mouth three (3) times daily. Max Daily Amount: 300 mg. IMPRESSION AND PLAN:  The patient consented to the tele health visit and was aware that there would be a charge. During today's D[employer] Me TeleVisit patient had c/o neck pain into the right shoulder. Multiple treatment options were discussed, which are limited at this point due to COVID-19. Pt denies any weakness. It is unclear to me why her BUE EMG was never performed. We will hold off on reordering the study secondary to [address]. She reported some improvement with Neurontin, I will restart her Neurontin 100 mg TID. Patient advised to call the office if intolerant to medication. I will see the patient back in 1 month's time or earlier if needed.      Written by Rm Gonzalez, as dictated by Leti Duran MD  I examined the patient, reviewed and agree with the note.

## 2020-04-17 NOTE — TELEPHONE ENCOUNTER
Patient called and said she had her VV appt with Anca Chopra today. Patient said that Anca Chopra told her he was going to prescribe her some more Gabapentin medication again. That she has run out. Patient would like it called into   [facility] [employer] [phone]. Patient tel. [phone].

## 2020-04-17 NOTE — TELEPHONE ENCOUNTER
Per his note, will restart her Neurontin 100 mg TID    Seen this am- I do not mind sending this but do not want to interrupt is charting.  Route back if you need me to sign

## 2020-04-20 ENCOUNTER — TELEPHONE (OUTPATIENT)
Dept: INTERNAL MEDICINE CLINIC | Age: 70
End: 2020-04-20

## 2020-04-20 NOTE — TELEPHONE ENCOUNTER
Called patient to follow up on diabetes and BG. No answer, left voicemail requesting patient return call to PharmD. Also contacted NP Emigdio Wilkes who patient sees for endocrinology to help clarify plan for patient's diabetes management given care challenges due to social issues. NP Kaelyn Barker agrees plan for once weekly GLP-1 agonist would be ideal. States if patient is approved for Medicaid, then she might be able to get coverage for home health aid who can help remind patient to administer doses once weekly. Will route note to PCP.     Tony Oconnor, PharmD  Clinical Pharmacist Specialist

## 2020-04-20 NOTE — TELEPHONE ENCOUNTER
Patient called PharmD. [facility] she was told that her prescription insurance with [employer] through [facility] was automatically transferred to [facility] for coverage. [facility] she is being charged $320 per month for her coverage. [facility] she was told that she would need to transition her medications from [employer] to [facility] mail order pharmacy in order to get lower prescription medication copay prices. [facility] she spoke with Bart coley and her ticket number for Harpreet Carter was [phone] and the [employer] phone numbers were [phone] and [phone]. [facility] her [employer] account number is [phone]. Discussed with patient that likely [facility] mail order pharmacy does not do compliance packaging like she was previously getting. Called [facility] - spoke with [facility], Medicare, and mail order pharmacy staff. Per [facility] and [facility] retail compliance packaging pharmacy (multidose packing pharmacy telephone number [phone]), patient can get compliance packaging covered through [facility] for preferred price if she has medications filled through [facility] retail compliance packaging pharmacy. Pharmacist there requests that patient call [facility] retail compliance packaging pharmacy to confirm enrollment in packaging program.    Discussed this with patient, who states she will call [facility] and [facility] retail compliance packaging pharmacy to confirm transfer so she can get her medications in compliance packaging through [facility]. Patient expressed understanding and had no further questions.     Addie Cagle, YahirD  Clinical Pharmacist Specialist

## 2020-04-21 ENCOUNTER — PATIENT OUTREACH (OUTPATIENT)
Dept: CASE MANAGEMENT | Age: 70
End: 2020-04-21

## 2020-04-21 RX ORDER — GABAPENTIN 100 MG/1
100 CAPSULE ORAL 3 TIMES DAILY
Qty: 90 CAP | Refills: 1 | Status: SHIPPED | OUTPATIENT
Start: 2020-04-21 | End: 2020-06-01 | Stop reason: SDUPTHER

## 2020-04-21 NOTE — PROGRESS NOTES
MSW received referral from [facility] to assist the patient with community resources. MSW contacted Ms. Trae Posey who verified her name and  as identifiers. MSW introduced self, role and gave reason for call. Ms Trae Posey was very engaged in conversation with MSW. She shared that she lost a family member to diabetes complication and she was saddened because she would not be able to attend his viewing or .  Ms Trae Posey reported that she contacted the [facility] and was informed that due to the Coronavirus, they are currently not able to provide updates due to staff not being available. The patient is being followed by a nurse to  assist with medication compliance.      MSW will continue to follow to assist as needed.

## 2020-04-22 ENCOUNTER — TELEPHONE (OUTPATIENT)
Dept: INTERNAL MEDICINE CLINIC | Age: 70
End: 2020-04-22

## 2020-04-22 NOTE — TELEPHONE ENCOUNTER
Called patient to follow up on medication access. States she is getting last package from [facility] on May 10. States bupropion was increased by her psychiatrist from 75 mg daily to 150 mg daily. States temazepam was increased from 7.5 mg daily to 15 mg daily. States Rxs were called into [facility] by her providers. States Dr. Michaela Devlin started her on gabapentin 100 mg daily at bedtime for 3 days, then 100 mg BID for 3 days, then 100 mg TID. States she hasn't received it yet from [facility]. States she ran out of Pazeo eye drops but will get these from [facility]. States she hasn't started Zoloft yet. She was on it in the past at least 6 months ago,  but it hasn't been restarted by psychiatrist yet. States her BG has been the following:    Date Fasting Before Lunch After Lunch Before Dinner Bedtime   4/21 157       4/20 149 123      4/19 150  111  101   4/18 175   112    4/17   123     4/16 184 126      4/15     115   4/14 177 124      -States her BG has been highest fasting then is good later in the afternoon    States she is still getting meals through [facility]. States she gets a lot of pasta. States she sometimes eats crackers when she's not hungry. Denies any symptoms of hypoglycemia. Instructed patient to continue current diabetes regimen of Januvia 50 mg daily and continue checking BG at least 1-2 times daily. Patient expressed understanding and had no further questions.     Astrid Soto, PharmD  Clinical Pharmacist Specialist

## 2020-04-24 ENCOUNTER — TELEPHONE (OUTPATIENT)
Dept: INTERNAL MEDICINE CLINIC | Age: 70
End: 2020-04-24

## 2020-05-07 ENCOUNTER — TELEPHONE (OUTPATIENT)
Dept: INTERNAL MEDICINE CLINIC | Age: 70
End: 2020-05-07

## 2020-05-07 NOTE — TELEPHONE ENCOUNTER
Called patient to follow up on BG, diabetes, and medication access. She requests PharmD call back for appointment tomorrow instead. Scheduled patient for telephone appointment on 5/8 per her request.    Patient expressed understanding and had no further questions.     Kate Johnson, Lolly  Clinical Pharmacist Specialist

## 2020-05-07 NOTE — PROGRESS NOTES
Pharmacy Note:  Diabetes Follow-up    S/O: Placed an outgoing call to patient to follow-up on SMBG readings and diabetes. Patient referred by Lewis Lynn MD for diabetes management. Patient is currently taking the following for diabetes: Januvia 50 mg daily. She denies any signs/sx of hypoglycemia. Last A1C was 12.3 on 2/24/2020. Patient is checking Her blood sugar 1 times daily and reports the following values:  Date Fasting Bedtime   5/8 158    5/7 153    5/6 138    5/5 134    5/4 202 (at 8 PM, hadn't eaten yet) 100   5/3 145    5/2 136    5/1 145      Diet/Exercise:  -States on days she is feeling depressed, she doesn't eat until dinner time some days   -Denies any suicidal ideation or desire to harm herself    Medication Adherence:  -Patient denies adherence with medications - not taking gabapentin since waiting to get this in mail   -States forgets medicines for a day or two at times  -Patient denies adverse effects from medications  -States she is taking bupropion 75 mg daily until Wellbutrin  mg Rx comes in mail (was increased by her psychiatrist)  -States she is getting shipment of medicines in compliance packaging from [facility] on 5/20    Screenings:  Diabetic Foot and Eye Exam HM Status   Topic Date Due    Diabetic Foot Care  04/10/2019    Eye Exam  01/23/2022       Current Outpatient Medications   Medication Sig    gabapentin (Neurontin) 100 mg capsule Take 1 Cap by mouth three (3) times daily. Max Daily Amount: 300 mg.  cyanocobalamin (Vitamin B-12) 1,000 mcg tablet Take 1,000 mcg by mouth daily.  bisacodyL 5 mg tab Take 1 Tab by mouth daily as needed.  temazepam (RESTORIL) 7.5 mg capsule Take 1 Cap by mouth nightly.  Januvia 50 mg tablet Take 1 Tab by mouth daily.  buPROPion (WELLBUTRIN) 75 mg tablet Take 1 Tab by mouth daily.  simethicone (GAS-X PO) Take 125 mg by mouth daily as needed.     oxybutynin (DITROPAN) 5 mg tablet Take 1 Tab by mouth two (2) times a day. Indications: overactive bladder    pantoprazole (PROTONIX) 40 mg tablet Take 1 Tab by mouth daily. TAKE 1 TABLET BY MOUTH ONCE DAILY  Indications: gastroesophageal reflux disease    atorvastatin (LIPITOR) 10 mg tablet Take 1 Tab by mouth daily. TAKE 1 TABLET BY MOUTH ONCE DAILY  Indications: high cholesterol    montelukast (SINGULAIR) 10 mg tablet Take 1 Tab by mouth daily. Indications: controller medication for asthma    sertraline (ZOLOFT) 100 mg tablet Take 200 mg by mouth daily. Indications: major depressive disorder    acetaminophen (TYLENOL) 500 mg capsule Take 1,000 mg by mouth every six (6) hours as needed for Pain (back).  olopatadine (PAZEO) 0.7 % drop Apply 1 Drop to eye daily. both eyes  Indications: Allergic Conjunctivitis    sodium chloride (OCEAN) 0.65 % nasal squeeze bottle 1 [place] by Both Nostrils route as needed for Congestion.  prednisoLONE acetate (PRED FORTE) 1 % ophthalmic suspension Administer 1 Drop to both eyes four (4) times daily. Indications: Prevent Inflammation of the Cornea due to the Medication Cytarabine    fluticasone-vilanterol (BREO ELLIPTA) 200-25 mcg/dose inhaler Take 1 Puff by inhalation daily. Indications: Controller Medication for Asthma    cycloSPORINE (RESTASIS) 0.05 % ophthalmic emulsion Instill 1 Drop in affected eye(s) Every 12 hours.  ipratropium-albuterol (COMBIVENT RESPIMAT)  mcg/actuation inhaler Take 1 inhalation by mouth Take As Needed daily for asthma     No current facility-administered medications for this visit.         Lab Results   Component Value Date/Time    Hemoglobin A1c 12.3 (H) 02/24/2020 11:41 AM    Hemoglobin A1c (POC) 6.3 08/15/2019 11:49 AM       Lab Results   Component Value Date/Time    Sodium 144 02/24/2020 11:41 AM    Potassium 3.9 02/24/2020 11:41 AM    Chloride 110 02/24/2020 11:41 AM    CO2 30 02/24/2020 11:41 AM    Anion gap 4 02/24/2020 11:41 AM    Glucose 180 (H) 02/24/2020 11:41 AM    BUN 15 02/24/2020 11:41 AM Creatinine 1.50 (H) 02/24/2020 11:41 AM    BUN/Creatinine ratio 10 (L) 02/24/2020 11:41 AM    GFR est AA 41 (L) 02/24/2020 11:41 AM    GFR est non-AA 34 (L) 02/24/2020 11:41 AM    Calcium 9.2 02/24/2020 11:41 AM       Lab Results   Component Value Date/Time    Cholesterol, total 193 02/24/2020 11:41 AM    HDL Cholesterol 95 (H) 02/24/2020 11:41 AM    LDL, calculated 81.8 02/24/2020 11:41 AM    Triglyceride 81 02/24/2020 11:41 AM    CHOL/HDL Ratio 2.0 02/24/2020 11:41 AM     A/P:    Diabetes:  -Instructed patient to continue Januvia 50 mg daily  -Reiterated importance of checking BG 1-2 times daily (fasting and bedtime) and not skipping meals when possible due to risk of hypoglycemia  -Reiterated importance of following with her psychiatrist for her mood   -Patient states she has telehealth appointment coming up    Reviewed with patient signs/sx/treatment of hypoglycemia. Patient verbalized understanding. Will follow up with patient in 2 weeks for telephone visit. Will route note to PCP.     Raul Martínez, PharmD  Clinical Pharmacist Specialist    CLINICAL PHARMACY CONSULT: MED RECONCILIATION/REVIEW ADDENDUM    For Pharmacy Admin Tracking Only    PHSO: PHSO Patient?: Yes  Total # of Interventions Recommended: Count: 0  Total Interventions Accepted: 0  Time Spent (min): [address] [facility], PharmD

## 2020-05-08 ENCOUNTER — VIRTUAL VISIT (OUTPATIENT)
Dept: INTERNAL MEDICINE CLINIC | Age: 70
End: 2020-05-08

## 2020-05-08 DIAGNOSIS — E11.21 TYPE 2 DIABETES WITH NEPHROPATHY (HCC): Primary | ICD-10-CM

## 2020-05-08 NOTE — Clinical Note
Hi Dr. Henry Mcneill,  I spoke with Ms. Shante Fulton yesterday. Her BG has improved and ranges 100s-200s. Of note, she states she has days where she feels depressed. She denied any suicidal ideation or intent to harm herself. I reiterated importance of her taking her medications and following with her psychiatrist. I am calling her in 2 weeks when she gets her next shipment of medications in the mail to ensure she gets all her needed prescriptions. Please let me know if you have any additional recommendations for her.   Best, Steffani Meigs, PharmD Clinical Pharmacist Specialist

## 2020-05-15 ENCOUNTER — TELEPHONE (OUTPATIENT)
Dept: INTERNAL MEDICINE CLINIC | Age: 70
End: 2020-05-15

## 2020-05-15 NOTE — TELEPHONE ENCOUNTER
Received call from patient. States she spoke with "[employer] (compliance packaging) pharmacist and was provided with fax number ([phone]). States they request her providers fax her prescriptions to their pharmacy in order to have medications filled in compliance packaging and shipped to patient. Patient states she will call her specialists to have them fax Rxs. Will discuss with patient's PCP Dr. Perla Jennings to have PCP Rxs faxed to "[employer] per patient's request.    Patient also states she got last [facility] shipment today. Called [facility] and confirmed that patient received all of her medications except gabapentin. Patient states she will follow up with Dr. Kenrick Malik about getting Rx faxed to [employer].    Instructed patient to contact PharmD if she has trouble contacting any of her providers for Rxs. Reiterated importance of medication compliance. Patient expressed understanding and had no further questions.     Maico Freire PharmD  Clinical Pharmacist Specialist

## 2020-05-18 ENCOUNTER — VIRTUAL VISIT (OUTPATIENT)
Dept: ORTHOPEDIC SURGERY | Age: 70
End: 2020-05-18

## 2020-05-18 ENCOUNTER — DOCUMENTATION ONLY (OUTPATIENT)
Dept: ORTHOPEDIC SURGERY | Age: 70
End: 2020-05-18

## 2020-05-18 DIAGNOSIS — M50.30 DDD (DEGENERATIVE DISC DISEASE), CERVICAL: ICD-10-CM

## 2020-05-18 DIAGNOSIS — M48.02 CERVICAL SPINAL STENOSIS: Primary | ICD-10-CM

## 2020-05-18 DIAGNOSIS — M54.12 CERVICAL NEURITIS: ICD-10-CM

## 2020-05-18 RX ORDER — GLIPIZIDE 5 MG/1
TABLET ORAL
COMMUNITY
Start: 2020-02-11 | End: 2020-06-01

## 2020-05-18 RX ORDER — GABAPENTIN 100 MG/1
100 CAPSULE ORAL 3 TIMES DAILY
Qty: 90 CAP | Refills: 1 | Status: SHIPPED | OUTPATIENT
Start: 2020-05-18 | End: 2020-06-16

## 2020-05-18 RX ORDER — LIDOCAINE 50 MG/G
PATCH TOPICAL
COMMUNITY
Start: 2020-01-16 | End: 2020-06-01

## 2020-05-18 NOTE — PROGRESS NOTES
Patient called to advise that due to her new Medicare plan, prescriptions need to be called in as \"simple dose\" instead of \"simple pack\", this will ensure patient will receive correct dosage. Please submit future prescription requests via fax to [employer] home delivery, fax # [phone], phone # [phone].

## 2020-05-18 NOTE — PROGRESS NOTES
[facility]  [address], [address]  Phone: [phone]  Fax: [phone]        PROGRESS NOTE    CONSENT:  Pursuant to the emergency declaration under the [employer] and [facility] [place], 1135 waiver authority and the [employer] Act, this Virtual Visit was conducted, with patient's consent, to reduce the patient's risk of exposure to COVID-19 and provide continuity of care for an established patient. Services were unable to be provided through a video synchronous discussion virtually to substitute for in-person appointment. Subsequently, the patient was consulted through a telephone discussion. ENCOUNTER DURATION (minutes): 11    HISTORY OF PRESENT ILLNESS:  The patient is a 68 y.o. female and was consulted via telephone at the [facility] office for follow up of  neck pain radiating into the right shoulder following fall at work in 1999. Additionally, she endorses joint pain at the hips and knees. She reports progressive, constant falls, at least 1 fall per day x 1 year. Her most recent fall was in December 2019. Pt has diffuse, widespread, pain complaint. Pt is a poor historian. She denies h/o spinal surgery. Pt reports improvement due to PT. Pt recalls spinal injections through pain management years ago. Pt reports she was receiving trigger injections through Dr. Sole Amezcua. She reports Dr. Sole Amezcua discharged her two months ago due to improvement in pain and falls. Treated with Norco and Flexeril with benefit. Allergic to Flexeril, Nabumetone, Tramadol, and contrast. Pt is not a candidate for CYMBALTA secondary to Wellbutrin. Pt is a poor historian. Pt denies fever, weight loss, or skin changes. The patient is RHD.  PmHx overactive bladder, DM, anemia, stage 3 CKD (Dr. Vicenta Reed), bipolar disorder (not followed by a psychiatrist), right knee replacement (2012), neuropathy. Dr. Vicenta Reed defined her maximum safe dose of Neurontin is 700 mg TID. Note from Carolin Baez MD dated 8/15/19 indicating patient was seen with c/o multiple falls. Feels dizzy at times with unsteady gait. Week prior had fallen backwards. Worsening pain in the entire back. Chronic x several years. She additionally has a right foot drop and uses canes for ambulation. She has had a neuro workup. Chronic ischemic changes noted by MRI. Pt has a home health nurse. Treating with Norco. Referred to nephrology. Recent A1c was 6.3, date unknown. Referred to me for neck pain. Cervical spine CT dated 3/16/17 films not available for my review. Per report,No evidence of fracture or acute traumatic subluxation involving the cervical spine. Reversal of the usual cervical lordosis, unchanged from prior radiographs. Multilevel cervical spondylosis, most pronounced C4-C7. Mild to moderate osseous canal stenosis at C5-C6. Multilevel uncovertebral and facet joint osteoarthritis with neuroforaminal narrowing as above. Enlarged thyroid gland. Correlation with prior ultrasound imaging of the thyroid is recommended. If no prior imaging is available, consideration may be given to a nonemergent thyroid ultrasound. Cervical spine XR dated 8/15/19 reviewed. Per report, Multilevel marked degenerative disc disease mid to lower cervical spine. Thoracic spine XR dated 8/15/19 films not available for my review. Per report,Very subtle scoliosis, as described.  Mild spondylitic degenerative change with endplate osteophytes at several levels in the midthoracic spine without other significant bony abnormality. Lumbar spine XR dated 8/15/19 films not available for my review. Per report, No acute bony abnormality is seen. Mild anterior spondylolisthesis L4-5.  Possible facet arthropathy at L4-5 and L5-S1.  No other significant bony abnormality is seen. Cervical spine MRI dated 11/19/19 reviewed.  Per report, Overall straightening of the normal cervical lordosis with gradual C4-C6 kyphosis and multilevel degenerative disc disease. Associated cervical cord impingement, moderate at C4-C5, moderate at C5-C6 and minimal at C6-C7. Facet arthropathy and uncovertebral proliferative changes cause several high-grade foraminal stenoses - severe on the right at C4-C5, moderately severe on the right and severe on the left at C5-C6 and C6-C7. At her last clinical appointment, it was unclear to me why her BUE EMG was never performed. We held off on reordering the study secondary to [address]. She reported some improvement with Neurontin, I restarted her Neurontin 100 mg TID.      At today's telephone consultation, the patient reports her pain location and distribution remains unchanged. She rates her pain  6/10, previously 0-8/10. At last visit we planned to restart her Neurontin 100 mg TID. Patient reports she changed her pharmacy. Subsequently, she did not restart the medication and did not notify the office. She was compliant with her Neurontin 100 mg TID. She indicates 1 fall without injury since last visit. She is compliant with her HEP. Pt denies change in bowel or bladder habits. Pt denies any signs of weakness.  reviewed. There is no height or weight on file to calculate BMI.     PCP: Jennifer Salazar MD      Past Medical History:   Diagnosis Date    Allergic rhinitis     Anemia     Asthma     Candida vaginitis     CKD (chronic kidney disease) stage 3, GFR 30-59 ml/min (HCC)     COPD (chronic obstructive pulmonary disease) (HCC)     Diabetes ([address]     Diabetes ([facility]     GERD (gastroesophageal reflux disease)     h pylori gastritis egd 11/14 dr Katlyn Lees    Headache     Hx of colonoscopy 11/05/2014    normal rpt 10 years, 11/2024, dr. Katlyn Lees    Hypercholesterolemia     Mild dementia ([address]     OAB (overactive bladder)     CHRIS on CPAP     Radiculopathy     Urinary incontinence     UTI (urinary tract infection)         Social History     Socioeconomic History    Marital status:      Spouse name: Not on file    Number of children: Not on file    Years of education: Not on file    Highest education level: Not on file   Occupational History    Not on file   Social Needs    Financial resource strain: Not on file    Food insecurity     Worry: Not on file     Inability: Not on file    Transportation needs     Medical: Not on file     Non-medical: Not on file   Tobacco Use    Smoking status: Former Smoker     Years: 20.00    Smokeless tobacco: Former User     Quit date: 1/1/1999   Substance and Sexual Activity    Alcohol use: Yes    Drug use: No    Sexual activity: Not Currently     Partners: Male   Lifestyle    Physical activity     Days per week: Not on file     Minutes per session: Not on file    Stress: Not on file   Relationships    Social connections     Talks on phone: Not on file     Gets together: Not on file     Attends Orthodoxy service: Not on file     Active member of club or organization: Not on file     Attends meetings of clubs or organizations: Not on file     Relationship status: Not on file    Intimate partner violence     Fear of current or ex partner: Not on file     Emotionally abused: Not on file     Physically abused: Not on file     Forced sexual activity: Not on file   Other Topics Concern    Not on file   Social History Narrative    Not on file       Current Outpatient Medications   Medication Sig Dispense Refill    cyanocobalamin, vitamin B-12, (VITAMIN B-12 PO) Take 2,500 mcg by mouth daily.  temazepam (RESTORIL) 15 mg capsule Take 1 Cap by mouth nightly.  Januvia 50 mg tablet Take 1 Tab by mouth daily.  buPROPion (WELLBUTRIN) 75 mg tablet Take 1 Tab by mouth daily.  simethicone (GAS-X PO) Take 125 mg by mouth daily as needed.  oxybutynin (DITROPAN) 5 mg tablet Take 1 Tab by mouth two (2) times a day. Indications: overactive bladder 180 Tab 3    pantoprazole (PROTONIX) 40 mg tablet Take 1 Tab by mouth daily. TAKE 1 TABLET BY MOUTH ONCE DAILY  Indications: gastroesophageal reflux disease 90 Tab 3    atorvastatin (LIPITOR) 10 mg tablet Take 1 Tab by mouth daily. TAKE 1 TABLET BY MOUTH ONCE DAILY  Indications: high cholesterol 90 Tab 3    montelukast (SINGULAIR) 10 mg tablet Take 1 Tab by mouth daily. Indications: controller medication for asthma 90 Tab 3    sertraline (ZOLOFT) 100 mg tablet Take 200 mg by mouth daily. Indications: major depressive disorder      acetaminophen (TYLENOL) 500 mg capsule Take 1,000 mg by mouth every six (6) hours as needed for Pain (back).  sodium chloride (OCEAN) 0.65 % nasal squeeze bottle 1 [place] by Both Nostrils route as needed for Congestion.  prednisoLONE acetate (PRED FORTE) 1 % ophthalmic suspension Administer 1 Drop to both eyes four (4) times daily. Indications: Prevent Inflammation of the Cornea due to the Medication Cytarabine      fluticasone-vilanterol (BREO ELLIPTA) 200-25 mcg/dose inhaler Take 1 Puff by inhalation daily. Indications: Controller Medication for Asthma      cycloSPORINE (RESTASIS) 0.05 % ophthalmic emulsion 1 Drop. 1 drop in each eye once a day      ipratropium-albuterol (COMBIVENT RESPIMAT)  mcg/actuation inhaler Take 1 inhalation by mouth Take As Needed daily for asthma      glipiZIDE (GLUCOTROL) 5 mg tablet       lidocaine (LIDODERM) 5 % Apply 1 patch as directed for 12 hours every 24 hours (12 hours on, 12 hours off)      gabapentin (Neurontin) 100 mg capsule Take 1 Cap by mouth three (3) times daily. Max Daily Amount: 300 mg. 90 Cap 1    bisacodyL 5 mg tab Take 1 Tab by mouth daily as needed.  olopatadine (PAZEO) 0.7 % drop Apply 1 Drop to eye daily. both eyes  Indications: Allergic Conjunctivitis         Allergies   Allergen Reactions    Aspirin Other (comments)    Flexeril [Cyclobenzaprine] Other (comments)     Patient states not allergic to this medication.     Iodine Swelling    Nabumetone Swelling    Penicillins Unknown (comments)     Other reaction(s): Other (see comments)    Shellfish Containing Products Swelling    Sulfa (Sulfonamide Antibiotics) Swelling    Tramadol Itching          PHYSICAL EXAMINATION  Unable to perform examination secondary to COVID-19. CONSTITUTIONAL: NAD, A&O x 3    ASSESSMENT   Diagnoses and all orders for this visit:    1. Cervical spinal stenosis    2. Cervical neuritis    3. DDD (degenerative disc disease), cervical      IMPRESSION AND PLAN:  The patient consented to the tele health visit and was aware that there would be a charge. During today's  telephone consultation, the patient had c/o neck pain radiating into the right shoulder. Multiple treatment options were discussed. Pt appears to be neurologically intact. Patient reports she never got her prescription from last clinic appointment as she changed pharmacies. I will attempt to restart her on Neurontin 100 mg TID before considering more aggressive treatment. Patient advised to call the office if intolerant to medication. I will see the patient back in 1 month's time or earlier if needed. Written by Yunier Griffiths, as dictated by Coral Hansen MD  I examined the patient, reviewed and agree with the note.

## 2020-05-19 NOTE — TELEPHONE ENCOUNTER
Prescriptions have been pended to print   Times have been added  Will need to fax to [email or web address] simple pill [place]

## 2020-05-20 ENCOUNTER — VIRTUAL VISIT (OUTPATIENT)
Dept: INTERNAL MEDICINE CLINIC | Age: 70
End: 2020-05-20

## 2020-05-20 DIAGNOSIS — E11.21 TYPE 2 DIABETES WITH NEPHROPATHY (HCC): Primary | ICD-10-CM

## 2020-05-20 NOTE — Clinical Note
Hi Dr. Verito Pimentel,  I spoke with Ms. Timothy Sellers to follow up on BG. BG are running 80s-160s on Januvia. She states she has enough of her medications until she gets her first shipment from [employer] mail order. Of note, she reports some food insecurity. She states she gets meals on wheels (1 hot and 1 cold meal) each day, but serving sizes are small. She states she has enough food right now but worries sometimes about having enough. I CC'ed our  Alberto Leblanc to see if she can follow up with Ms. Timothy Sellers to discuss.   Thank you, Dinorah Lopez, PharmD Clinical Pharmacist Specialist

## 2020-05-20 NOTE — PROGRESS NOTES
Pharmacy Note:  Diabetes Follow-up    S/O: Placed an outgoing call to patient to follow-up on SMBG readings and diabetes. Patient referred by Keri Figueroa MD for diabetes management. Patient is currently taking the following for diabetes: Januvia 50 mg daily.  -States she has enough of her medications through [address] [place] [address] until she transitions to [facility] with [facility]    She denies any signs/sx of hypoglycemia. Denies any BG <70. Reports when BG is high, she feels a little dazed and weak. Reports a little blurry vision. Last A1C was 12.3 on 2/24/2020. Patient is checking Her blood sugar 1-2 times daily and reports the following values:  Date Fasting Bedtime   5/20 148    5/19 168    5/18 156    5/17 147    5/15 131    5/13 122    5/12 117 81   5/11 145 116   -States highest BG was 170s  -States lowest BG was 81 - states had an orange    Diet/Exercise:  -States diet and exercise have been the same  -States she is still getting meals on wheels - gets 1 cold and 1 hot meal per day   -States she eats them both at the same time since serving is small  -States she got some food from a friend recently, so she states \"right now I have enough\"  -States she feels like she can't depend on her daughter who lives in the area    Medication Adherence:  -Patient reports adherence with medications  -Patient denies adverse effects from medications    Screenings:  Diabetic Foot and Eye Exam HM Status   Topic Date Due    Diabetic Foot Care  04/10/2019    Eye Exam  01/23/2022       Current Outpatient Medications   Medication Sig    glipiZIDE (GLUCOTROL) 5 mg tablet     lidocaine (LIDODERM) 5 % Apply 1 patch as directed for 12 hours every 24 hours (12 hours on, 12 hours off)    gabapentin (Neurontin) 100 mg capsule Take 1 Cap by mouth three (3) times daily. Max Daily Amount: 300 mg.    gabapentin (Neurontin) 100 mg capsule Take 1 Cap by mouth three (3) times daily. Max Daily Amount: 300 mg.     cyanocobalamin, vitamin B-12, (VITAMIN B-12 PO) Take 2,500 mcg by mouth daily.  bisacodyL 5 mg tab Take 1 Tab by mouth daily as needed.  temazepam (RESTORIL) 15 mg capsule Take 1 Cap by mouth nightly.  Januvia 50 mg tablet Take 1 Tab by mouth daily.  buPROPion (WELLBUTRIN) 75 mg tablet Take 1 Tab by mouth daily.  simethicone (GAS-X PO) Take 125 mg by mouth daily as needed.  oxybutynin (DITROPAN) 5 mg tablet Take 1 Tab by mouth two (2) times a day. Indications: overactive bladder    pantoprazole (PROTONIX) 40 mg tablet Take 1 Tab by mouth daily. TAKE 1 TABLET BY MOUTH ONCE DAILY  Indications: gastroesophageal reflux disease    atorvastatin (LIPITOR) 10 mg tablet Take 1 Tab by mouth daily. TAKE 1 TABLET BY MOUTH ONCE DAILY  Indications: high cholesterol    montelukast (SINGULAIR) 10 mg tablet Take 1 Tab by mouth daily. Indications: controller medication for asthma    sertraline (ZOLOFT) 100 mg tablet Take 200 mg by mouth daily. Indications: major depressive disorder    acetaminophen (TYLENOL) 500 mg capsule Take 1,000 mg by mouth every six (6) hours as needed for Pain (back).  olopatadine (PAZEO) 0.7 % drop Apply 1 Drop to eye daily. both eyes  Indications: Allergic Conjunctivitis    sodium chloride (OCEAN) 0.65 % nasal squeeze bottle 1 [place] by Both Nostrils route as needed for Congestion.  prednisoLONE acetate (PRED FORTE) 1 % ophthalmic suspension Administer 1 Drop to both eyes four (4) times daily. Indications: Prevent Inflammation of the Cornea due to the Medication Cytarabine    fluticasone-vilanterol (BREO ELLIPTA) 200-25 mcg/dose inhaler Take 1 Puff by inhalation daily. Indications: Controller Medication for Asthma    cycloSPORINE (RESTASIS) 0.05 % ophthalmic emulsion 1 Drop. 1 drop in each eye once a day    ipratropium-albuterol (COMBIVENT RESPIMAT)  mcg/actuation inhaler Take 1 inhalation by mouth Take As Needed daily for asthma     No current facility-administered medications for this visit. Lab Results   Component Value Date/Time    Hemoglobin A1c 12.3 (H) 02/24/2020 11:41 AM    Hemoglobin A1c (POC) 6.3 08/15/2019 11:49 AM       Lab Results   Component Value Date/Time    Sodium 144 02/24/2020 11:41 AM    Potassium 3.9 02/24/2020 11:41 AM    Chloride 110 02/24/2020 11:41 AM    CO2 30 02/24/2020 11:41 AM    Anion gap 4 02/24/2020 11:41 AM    Glucose 180 (H) 02/24/2020 11:41 AM    BUN 15 02/24/2020 11:41 AM    Creatinine 1.50 (H) 02/24/2020 11:41 AM    BUN/Creatinine ratio 10 (L) 02/24/2020 11:41 AM    GFR est AA 41 (L) 02/24/2020 11:41 AM    GFR est non-AA 34 (L) 02/24/2020 11:41 AM    Calcium 9.2 02/24/2020 11:41 AM       Lab Results   Component Value Date/Time    Cholesterol, total 193 02/24/2020 11:41 AM    HDL Cholesterol 95 (H) 02/24/2020 11:41 AM    LDL, calculated 81.8 02/24/2020 11:41 AM    Triglyceride 81 02/24/2020 11:41 AM    CHOL/HDL Ratio 2.0 02/24/2020 11:41 AM       A/P:    Diabetes:  -Instructed patient to continue Januvia  -Reiterated importance of checking BG 1-2 times daily  -PharmD will reach out to  who has been following with patient to see if there are any additional resources to assist patient with food insecurity and other issues    Reviewed with patient signs/sx/treatment of hypoglycemia. Patient verbalized understanding. Will follow up with patient in 2 weeks for telephone visit. Will route note to PCP.     Pao Bourgeois, PharmD  Clinical Pharmacist Specialist    CLINICAL PHARMACY CONSULT: MED RECONCILIATION/REVIEW ADDENDUM    For Pharmacy Admin Tracking Only    PHSO: PHSO Patient?: Yes  Total # of Interventions Recommended: Count: 0  Total Interventions Accepted: 0  Time Spent (min): [facility], PharmD

## 2020-05-21 ENCOUNTER — DOCUMENTATION ONLY (OUTPATIENT)
Dept: FAMILY MEDICINE CLINIC | Age: 70
End: 2020-05-21

## 2020-05-21 RX ORDER — ATORVASTATIN CALCIUM 10 MG/1
10 TABLET, FILM COATED ORAL
Qty: 90 TAB | Refills: 3 | Status: SHIPPED | OUTPATIENT
Start: 2020-05-21 | End: 2020-06-01 | Stop reason: SDUPTHER

## 2020-05-21 RX ORDER — OXYBUTYNIN CHLORIDE 5 MG/1
5 TABLET ORAL 2 TIMES DAILY
Qty: 180 TAB | Refills: 3 | Status: SHIPPED | OUTPATIENT
Start: 2020-05-21 | End: 2020-07-28 | Stop reason: SDUPTHER

## 2020-05-21 RX ORDER — MONTELUKAST SODIUM 10 MG/1
10 TABLET ORAL DAILY
Qty: 90 TAB | Refills: 3 | Status: SHIPPED | OUTPATIENT
Start: 2020-05-21 | End: 2020-06-01 | Stop reason: SDUPTHER

## 2020-05-21 RX ORDER — PANTOPRAZOLE SODIUM 40 MG/1
40 TABLET, DELAYED RELEASE ORAL DAILY
Qty: 90 TAB | Refills: 3 | Status: SHIPPED | OUTPATIENT
Start: 2020-05-21 | End: 2020-06-01 | Stop reason: SDUPTHER

## 2020-05-21 NOTE — PROGRESS NOTES
Prescriptions for lipitor, Singulair,protonix and ditropan have been faxed to [address] [place] [address]  At [phone]. As requested.

## 2020-05-22 ENCOUNTER — PATIENT OUTREACH (OUTPATIENT)
Dept: CASE MANAGEMENT | Age: 70
End: 2020-05-22

## 2020-05-22 NOTE — PROGRESS NOTES
MSW received message from [facility] (Pharmacist) to follow up with patient for concerns of meals the patient receives from [employer]. MSW contacted Ms Charmaine Harrell who verified her name and  as identifiers. MSW introduced self, role and reason for call. The patient is alert and oriented and didn't think that there was anything that could be done to change the amount of food being provided to her by [employer]. She understands that the meals are portion controlled and she is on a diabetic diet. She shared that she has food that she can prepare herself when she is not satisfied. Ms Charmaine aHrrell mentioned that her grand daughter is able to take her grocery shopping sometimes in addition to a  couple of friends who also assist with taking her when they are available. Ms Charmaine Harrell shared that she has always had issues with her diabetes and that it is sometimes very high. The patient was talkative and in very high spirits. She informed MSW that she was awaiting call for virtual appointment with her doctor so she ended the call early. MSW will follow to assist as needed.

## 2020-05-27 DIAGNOSIS — M54.12 CERVICAL NEURITIS: ICD-10-CM

## 2020-05-27 DIAGNOSIS — M48.02 CERVICAL SPINAL STENOSIS: ICD-10-CM

## 2020-05-27 DIAGNOSIS — M50.30 DDD (DEGENERATIVE DISC DISEASE), CERVICAL: ICD-10-CM

## 2020-05-27 NOTE — TELEPHONE ENCOUNTER
Patient is asking for a nurse to call her regarding her Gabapentin. She says a script must be faxed to [phone] and we must ask for \"packaging as simple dose\". Patient's phone number is [phone].

## 2020-06-01 ENCOUNTER — VIRTUAL VISIT (OUTPATIENT)
Dept: FAMILY MEDICINE CLINIC | Age: 70
End: 2020-06-01

## 2020-06-01 DIAGNOSIS — E78.00 PURE HYPERCHOLESTEROLEMIA: ICD-10-CM

## 2020-06-01 DIAGNOSIS — E11.42 TYPE 2 DIABETES MELLITUS WITH DIABETIC POLYNEUROPATHY, WITHOUT LONG-TERM CURRENT USE OF INSULIN (HCC): ICD-10-CM

## 2020-06-01 DIAGNOSIS — N18.30 CKD (CHRONIC KIDNEY DISEASE) STAGE 3, GFR 30-59 ML/MIN (HCC): ICD-10-CM

## 2020-06-01 DIAGNOSIS — I65.23 BILATERAL CAROTID ARTERY STENOSIS: ICD-10-CM

## 2020-06-01 DIAGNOSIS — J45.20 MILD INTERMITTENT ASTHMA WITHOUT COMPLICATION: ICD-10-CM

## 2020-06-01 DIAGNOSIS — Z78.0 POST-MENOPAUSAL: ICD-10-CM

## 2020-06-01 DIAGNOSIS — Z00.00 MEDICARE ANNUAL WELLNESS VISIT, SUBSEQUENT: ICD-10-CM

## 2020-06-01 DIAGNOSIS — Z12.31 BREAST CANCER SCREENING BY MAMMOGRAM: ICD-10-CM

## 2020-06-01 DIAGNOSIS — I67.89 CEREBRAL MICROVASCULAR DISEASE: ICD-10-CM

## 2020-06-01 DIAGNOSIS — F20.9 SCHIZOPHRENIA, UNSPECIFIED TYPE (HCC): ICD-10-CM

## 2020-06-01 DIAGNOSIS — K21.9 GASTROESOPHAGEAL REFLUX DISEASE, ESOPHAGITIS PRESENCE NOT SPECIFIED: ICD-10-CM

## 2020-06-01 DIAGNOSIS — F31.9 BIPOLAR AFFECTIVE DISORDER, REMISSION STATUS UNSPECIFIED (HCC): ICD-10-CM

## 2020-06-01 DIAGNOSIS — E11.21 TYPE 2 DIABETES WITH NEPHROPATHY (HCC): Primary | ICD-10-CM

## 2020-06-01 DIAGNOSIS — D63.8 ANEMIA OF CHRONIC DISEASE: ICD-10-CM

## 2020-06-01 DIAGNOSIS — J44.9 CHRONIC OBSTRUCTIVE PULMONARY DISEASE, UNSPECIFIED COPD TYPE (HCC): ICD-10-CM

## 2020-06-01 RX ORDER — BUPROPION HYDROCHLORIDE 150 MG/1
150 TABLET ORAL DAILY
COMMUNITY
Start: 2020-05-09 | End: 2022-07-26

## 2020-06-01 NOTE — TELEPHONE ENCOUNTER
This pharmacy faxed over request for the following prescriptions to be filled:    Medication requested :   Requested Prescriptions     Pending Prescriptions Disp Refills    atorvastatin (LIPITOR) 10 mg tablet 90 Tab 3     Sig: Take 1 Tab by mouth nightly. At 10:00 pm  Indications: high cholesterol    montelukast (Singulair) 10 mg tablet 90 Tab 3     Sig: Take 1 Tab by mouth daily. At 8:00 am  Indications: controller medication for asthma    pantoprazole (PROTONIX) 40 mg tablet 90 Tab 3     Sig: Take 1 Tab by mouth daily. At 8:00 am  Indications: gastroesophageal reflux disease    Januvia 50 mg tablet 90 Tab 3     Sig: Take 1 Tab by mouth daily. PCP: [address] [place] [address] or Print: [facility]  Mail order or Local pharmacy Mail Order     Scheduled appointment if not seen by current providers in office: [facility] 6/1/2020 No f/u up Scheduled at this time.

## 2020-06-01 NOTE — PROGRESS NOTES
1. Have you been to the ER, urgent care clinic since your last visit? Hospitalized since your last visit? No    2. Have you seen or consulted any other health care providers outside of the [address] [place] [address] since your last visit? Include any pap smears or colon screening.  No

## 2020-06-02 RX ORDER — MONTELUKAST SODIUM 10 MG/1
10 TABLET ORAL DAILY
Qty: 90 TAB | Refills: 3 | Status: ON HOLD | OUTPATIENT
Start: 2020-06-02

## 2020-06-02 RX ORDER — PANTOPRAZOLE SODIUM 40 MG/1
40 TABLET, DELAYED RELEASE ORAL DAILY
Qty: 90 TAB | Refills: 3 | Status: SHIPPED | OUTPATIENT
Start: 2020-06-02 | End: 2022-07-26

## 2020-06-02 RX ORDER — SITAGLIPTIN 50 MG/1
50 TABLET, FILM COATED ORAL DAILY
Qty: 90 TAB | Refills: 3 | Status: SHIPPED | OUTPATIENT
Start: 2020-06-02 | End: 2021-01-08 | Stop reason: SDUPTHER

## 2020-06-02 RX ORDER — ATORVASTATIN CALCIUM 10 MG/1
10 TABLET, FILM COATED ORAL
Qty: 90 TAB | Refills: 3 | Status: SHIPPED | OUTPATIENT
Start: 2020-06-02 | End: 2020-06-16

## 2020-06-03 ENCOUNTER — VIRTUAL VISIT (OUTPATIENT)
Dept: INTERNAL MEDICINE CLINIC | Age: 70
End: 2020-06-03

## 2020-06-03 DIAGNOSIS — E11.21 TYPE 2 DIABETES WITH NEPHROPATHY (HCC): Primary | ICD-10-CM

## 2020-06-03 NOTE — PROGRESS NOTES
Pharmacy Note:  Diabetes Follow-up    S/O: Placed an outgoing call to patient to follow-up on SMBG readings and diabetes. Patient referred by Molly Osorio MD for diabetes management. States she is having some hip and knee pain. Patient is currently taking the following for diabetes: Januvia 50 mg daily. She denies any signs/sx of hypoglycemia. Denies any BG <70. Last A1C was 12.3 on 2/24/2020. Patient is checking Her blood sugar 1 times daily and reports the following values:  Date Fasting Bedtime   6/3 166    6/2 161    6/1 134    5/31 179    5/30 181 100   5/29 163 104   5/28 141    5/27 181 145   5/26 125      Diet/Exercise:  -States she doesn't eat breakfast at the same time every morning  -States she eats around 3 meals a day - 2 from meals on wheels, sometimes eats something small at bedtime (fruits or sandwich or crackers with tea or coffee)    Medication Adherence:  -Patient reports adherence with medications  -Patient denies adverse effects from medications  -States she is still taking medications from [address] [place] [address]; states she is set up for shipments from [employer] for compliance packaging  -States she hasn't gotten gabapentin yet - will get it in next shipment from [facility]    Screenings:  Diabetic Foot and Eye Exam HM Status   Topic Date Due    Diabetic Foot Care  04/10/2019    Eye Exam  01/23/2022       Current Outpatient Medications   Medication Sig    atorvastatin (LIPITOR) 10 mg tablet Take 1 Tab by mouth nightly. At 10:00 pm  Indications: high cholesterol    montelukast (Singulair) 10 mg tablet Take 1 Tab by mouth daily. At 8:00 am  Indications: controller medication for asthma    pantoprazole (PROTONIX) 40 mg tablet Take 1 Tab by mouth daily. At 8:00 am  Indications: gastroesophageal reflux disease    Januvia 50 mg tablet Take 1 Tab by mouth daily.  buPROPion XL (WELLBUTRIN XL) 150 mg tablet     oxybutynin (DITROPAN) 5 mg tablet Take 1 Tab by mouth two (2) times a day.  Take at 8:00 am and 10:00 pm  Indications: overactive bladder    gabapentin (Neurontin) 100 mg capsule Take 1 Cap by mouth three (3) times daily. Max Daily Amount: 300 mg.  cyanocobalamin, vitamin B-12, (VITAMIN B-12 PO) Take 2,500 mcg by mouth daily.  bisacodyL 5 mg tab Take 1 Tab by mouth daily as needed.  temazepam (RESTORIL) 15 mg capsule Take 1 Cap by mouth nightly.  simethicone (GAS-X PO) Take 125 mg by mouth daily as needed.  sertraline (ZOLOFT) 100 mg tablet Take 200 mg by mouth daily. Indications: major depressive disorder    acetaminophen (TYLENOL) 500 mg capsule Take 1,000 mg by mouth every six (6) hours as needed for Pain (back).  olopatadine (PAZEO) 0.7 % drop Apply 1 Drop to eye daily. both eyes  Indications: Allergic Conjunctivitis    sodium chloride (OCEAN) 0.65 % nasal squeeze bottle 1 [place] by Both Nostrils route as needed for Congestion.  fluticasone-vilanterol (BREO ELLIPTA) 200-25 mcg/dose inhaler Take 1 Puff by inhalation daily. Indications: Controller Medication for Asthma    cycloSPORINE (RESTASIS) 0.05 % ophthalmic emulsion 1 Drop. 1 drop in each eye once a day    ipratropium-albuterol (COMBIVENT RESPIMAT)  mcg/actuation inhaler Take 1 inhalation by mouth Take As Needed daily for asthma     No current facility-administered medications for this visit.         Lab Results   Component Value Date/Time    Hemoglobin A1c 12.3 (H) 02/24/2020 11:41 AM    Hemoglobin A1c (POC) 6.3 08/15/2019 11:49 AM       Lab Results   Component Value Date/Time    Sodium 144 02/24/2020 11:41 AM    Potassium 3.9 02/24/2020 11:41 AM    Chloride 110 02/24/2020 11:41 AM    CO2 30 02/24/2020 11:41 AM    Anion gap 4 02/24/2020 11:41 AM    Glucose 180 (H) 02/24/2020 11:41 AM    BUN 15 02/24/2020 11:41 AM    Creatinine 1.50 (H) 02/24/2020 11:41 AM    BUN/Creatinine ratio 10 (L) 02/24/2020 11:41 AM    GFR est AA 41 (L) 02/24/2020 11:41 AM    GFR est non-AA 34 (L) 02/24/2020 11:41 AM    Calcium 9.2 02/24/2020 11:41 AM       Lab Results   Component Value Date/Time    Cholesterol, total 193 02/24/2020 11:41 AM    HDL Cholesterol 95 (H) 02/24/2020 11:41 AM    LDL, calculated 81.8 02/24/2020 11:41 AM    Triglyceride 81 02/24/2020 11:41 AM    CHOL/HDL Ratio 2.0 02/24/2020 11:41 AM       A/P:    Diabetes:  -Instructed patient to continue Januvia 50 mg daily  -Reiterated importance of checking BG 1-2 times daily    Reviewed with patient signs/sx/treatment of hypoglycemia. Patient verbalized understanding. Will call patient in 4 weeks for telephone follow up. Will route note to PCP.     Yasmeen Cortez, PharmD  Clinical Pharmacist Specialist    CLINICAL PHARMACY CONSULT: MED RECONCILIATION/REVIEW ADDENDUM    For Pharmacy Admin Tracking Only    PHSO: PHSO Patient?: Yes  Total # of Interventions Recommended: Count: 0  Total Interventions Accepted: 0  Time Spent (min): [facility], PharmD

## 2020-06-04 PROBLEM — J44.9 CHRONIC OBSTRUCTIVE PULMONARY DISEASE (HCC): Status: ACTIVE | Noted: 2020-06-04

## 2020-06-04 NOTE — PATIENT INSTRUCTIONS
Learning About Diabetes Food Guidelines Your Care Instructions Meal planning is important to manage diabetes. It helps keep your blood sugar at a target level (which you set with your doctor). You don't have to eat special foods. You can eat what your family eats, including sweets once in a while. But you do have to pay attention to how often you eat and how much you eat of certain foods. You may want to work with a dietitian or a certified diabetes educator (CDE) to help you plan meals and snacks. A dietitian or CDE can also help you lose weight if that is one of your goals. What should you know about eating carbs? Managing the amount of carbohydrate (carbs) you eat is an important part of healthy meals when you have diabetes. Carbohydrate is found in many foods. · Learn which foods have carbs. And learn the amounts of carbs in different foods. ? Bread, cereal, pasta, and rice have about 15 grams of carbs in a serving. A serving is 1 slice of bread (1 ounce), ½ cup of cooked cereal, or 1/3 cup of cooked pasta or rice. ? Fruits have 15 grams of carbs in a serving. A serving is 1 small fresh fruit, such as an apple or orange; ½ of a banana; ½ cup of cooked or canned fruit; ½ cup of fruit juice; 1 cup of melon or raspberries; or 2 tablespoons of dried fruit. ? Milk and no-sugar-added yogurt have 15 grams of carbs in a serving. A serving is 1 cup of milk or 2/3 cup of no-sugar-added yogurt. ? Starchy vegetables have 15 grams of carbs in a serving. A serving is ½ cup of mashed potatoes or sweet potato; 1 cup winter squash; ½ of a small baked potato; ½ cup of cooked beans; or ½ cup cooked corn or green peas. · Learn how much carbs to eat each day and at each meal. A dietitian or CDE can teach you how to keep track of the amount of carbs you eat. This is called carbohydrate counting.  
· If you are not sure how to count carbohydrate grams, use the Plate Method to plan meals. It is a good, quick way to make sure that you have a balanced meal. It also helps you spread carbs throughout the day. ? Divide your plate by types of foods. Put non-starchy vegetables on half the plate, meat or other protein food on one-quarter of the plate, and a grain or starchy vegetable in the final quarter of the plate. To this you can add a small piece of fruit and 1 cup of milk or yogurt, depending on how many carbs you are supposed to eat at a meal. 
· Try to eat about the same amount of carbs at each meal. Do not \"save up\" your daily allowance of carbs to eat at one meal. 
· Proteins have very little or no carbs per serving. Examples of proteins are beef, chicken, turkey, fish, eggs, tofu, cheese, cottage cheese, and peanut butter. A serving size of meat is 3 ounces, which is about the size of a deck of cards. Examples of meat substitute serving sizes (equal to 1 ounce of meat) are 1/4 cup of cottage cheese, 1 egg, 1 tablespoon of peanut butter, and ½ cup of tofu. How can you eat out and still eat healthy? · Learn to estimate the serving sizes of foods that have carbohydrate. If you measure food at home, it will be easier to estimate the amount in a serving of restaurant food. · If the meal you order has too much carbohydrate (such as potatoes, corn, or baked beans), ask to have a low-carbohydrate food instead. Ask for a salad or green vegetables. · If you use insulin, check your blood sugar before and after eating out to help you plan how much to eat in the future. · If you eat more carbohydrate at a meal than you had planned, take a walk or do other exercise. This will help lower your blood sugar. What else should you know? · Limit saturated fat, such as the fat from meat and dairy products. This is a healthy choice because people who have diabetes are at higher risk of heart disease.  So choose lean cuts of meat and nonfat or low-fat dairy products. Use olive or canola oil instead of butter or shortening when cooking. · Don't skip meals. Your blood sugar may drop too low if you skip meals and take insulin or certain medicines for diabetes. · Check with your doctor before you drink alcohol. Alcohol can cause your blood sugar to drop too low. Alcohol can also cause a bad reaction if you take certain diabetes medicines. Follow-up care is a key part of your treatment and safety. Be sure to make and go to all appointments, and call your doctor if you are having problems. It's also a good idea to know your test results and keep a list of the medicines you take. Where can you learn more? Go to http://danikaSomasophia.info/ Enter G509 in the search box to learn more about \"Learning About Diabetes Food Guidelines. \" Current as of: December 20, 2019               Content Version: 12.5 © 7377-9871 [employer]. Care instructions adapted under license by [employer] (which disclaims liability or warranty for this information). If you have questions about a medical condition or this instruction, always ask your healthcare professional. [address] any warranty or liability for your use of this information. Medicare Wellness Visit, Female The best way to live healthy is to have a lifestyle where you eat a well-balanced diet, exercise regularly, limit alcohol use, and quit all forms of tobacco/nicotine, if applicable. Regular preventive services are another way to keep healthy. Preventive services (vaccines, screening tests, monitoring & exams) can help personalize your care plan, which helps you manage your own care. Screening tests can find health problems at the earliest stages, when they are easiest to treat.   
[facility] follows the current, evidence-based guidelines published by the [employer] (USPSTF) when recommending preventive services for our patients. Because we follow these guidelines, sometimes recommendations change over time as research supports it. (For example, mammograms used to be recommended annually. Even though Medicare will still pay for an annual mammogram, the newer guidelines recommend a mammogram every two years for women of average risk). Of course, you and your doctor may decide to screen more often for some diseases, based on your risk and your co-morbidities (chronic disease you are already diagnosed with). Preventive services for you include: - Medicare offers their members a free annual wellness visit, which is time for you and your primary care provider to discuss and plan for your preventive service needs. Take advantage of this benefit every year! 
-All adults over the age of 72 should receive the recommended pneumonia vaccines. Current USPSTF guidelines recommend a series of two vaccines for the best pneumonia protection.  
-All adults should have a flu vaccine yearly and a tetanus vaccine every 10 years.  
-All adults age 48 and older should receive the shingles vaccines (series of two vaccines). -All adults age 38-68 who are overweight should have a diabetes screening test once every three years.  
-All adults born between [birth year] and 1967 should be screened once for Hepatitis C. 
-Other screening tests and preventive services for persons with diabetes include: an eye exam to screen for diabetic retinopathy, a kidney function test, a foot exam, and stricter control over your cholesterol.  
-Cardiovascular screening for adults with routine risk involves an electrocardiogram (ECG) at intervals determined by your doctor.  
-Colorectal cancer screenings should be done for adults age 54-65 with no increased risk factors for colorectal cancer. There are a number of acceptable methods of screening for this type of cancer.  Each test has its own benefits and drawbacks. Discuss with your doctor what is most appropriate for you during your annual wellness visit. The different tests include: colonoscopy (considered the best screening method), a fecal occult blood test, a fecal DNA test, and sigmoidoscopy. 
 
-A bone mass density test is recommended when a woman turns 65 to screen for osteoporosis. This test is only recommended one time, as a screening. Some providers will use this same test as a disease monitoring tool if you already have osteoporosis. -Breast cancer screenings are recommended every other year for women of normal risk, age 54-69. 
-Cervical cancer screenings for women over age 72 are only recommended with certain risk factors. Here is a list of your current Health Maintenance items (your personalized list of preventive services) with a due date: 
Health Maintenance Due Topic Date Due  Shingles Vaccine (1 of 2) 05/21/1998 Coretta Weber Diabetic Foot Care  04/10/2019  Mammogram  04/23/2020  Hemoglobin A1C    05/24/2020 Coretta Weber Annual Well Visit  05/13/2020

## 2020-06-04 NOTE — PROGRESS NOTES
Lucia Arellano is a 65 y.o. female who was seen by synchronous (real-time) audio-video technology on 6/1/2020. Consent: Lucia Arellano, who was seen by synchronous (real-time) audio-video technology, and/or her healthcare decision maker, is aware that this patient-initiated, Telehealth encounter on 6/1/2020 is a billable service, with coverage as determined by her insurance carrier. She is aware that she may receive a bill and has provided verbal consent to proceed: Yes. 712  Subjective: Lucia Arellano is a 65 y.o. female who was seen for Fatigue; Diabetes; Cholesterol Problem; and GERD    Ff-up     NIDDM w/ nephropathy/ neuropathy - Multiple ED visits for hyperglycemia. a1c   6.9>12. 6.  biggest concern is her ability to live independently/med supervision. She is in discussion with  from [facility] on assisted living residences /meals on wheels. Currently she is still living by herself, with daughter checking up on her she says. MARCELA from [facility] is helping set up reminders. Reviewed endo notes, huber average , due to her dementia, it is unsafe for her to be on insulin at home living alone and discussion on once a week injection with supervision of her daughter BODØ. reviewed DM educator Mor[facility] valladares- pill pack services, communicating with  nutrition resources. Improving hyperglycemia -145 currently on januvia  Repeat labs not done yet due to transportation issues, will reach out to Nurse navigator for assistance.       She has chronic anemia thought to be due to anemia of chronic disease/CKD. She reports GI work up 2014 normal. Following neprhologist Dr. Gianluca Denton  9.7 December 2019  HL- taking lipitor     OAB- taking oxybutynin and responding well to this.      GERD- asymptomatic, on protonix.     Chronic neck pain with radiculopathy-she is now seeing dr. Addy Gonzales gabapentin. Has fallen only once, fall precautions discussed.    Asthma-doing well she says, on ICS and LAMA for maintenance,  following Dr. Briseyda Mahajan     CHRIS on CPAP- reports consistent use,  following  Dr. Tony Holt. Reports spontaneous resolution of headaches        Vikram Gurrola ([phone]) and grand daughter Scot Otto ([phone]). Are assisting with care, there is talk to have her move in with them     Provided's currently:  COPD/CHRIS on CPAP- pulm Dr. Briseyda Mahajan  Memory/ dizzinessNeurology- NP bibealt/ Cardiology- Dr. Kristal Gomez  Depression/bipolar/ schizophrenia- [facility]   Anemia of chronic disease/ CKD- dr. Black [facility] garcia   Cervical DDD- JOLENE Jitendra Cruz   Prior to Admission medications    Medication Sig Start Date End Date Taking? Authorizing Provider   buPROPion XL (WELLBUTRIN XL) 150 mg tablet Take 150 mg by mouth daily. 5/9/20  Yes Provider, Historical   oxybutynin (DITROPAN) 5 mg tablet Take 1 Tab by mouth two (2) times a day. Take at 8:00 am and 10:00 pm  Indications: overactive bladder 5/21/20  Yes Alexis Baez MD   cyanocobalamin, vitamin B-12, (VITAMIN B-12 PO) Take 2,500 mcg by mouth daily. Yes Provider, Historical   bisacodyL 5 mg tab Take 1 Tab by mouth daily as needed. Yes Provider, Historical   temazepam (RESTORIL) 15 mg capsule Take 1 Cap by mouth nightly. 3/27/20  Yes Provider, Historical   simethicone (GAS-X PO) Take 125 mg by mouth daily as needed. Yes Provider, Historical   acetaminophen (TYLENOL) 500 mg capsule Take 1,000 mg by mouth every six (6) hours as needed for Pain (back). Yes Provider, Historical   sodium chloride (OCEAN) 0.65 % nasal squeeze bottle 1 [place] by Both Nostrils route as needed for Congestion. Yes Provider, Historical   fluticasone-vilanterol (BREO ELLIPTA) 200-25 mcg/dose inhaler Take 1 Puff by inhalation daily.  Indications: Controller Medication for Asthma   Yes Provider, Historical   cycloSPORINE (RESTASIS) 0.05 % ophthalmic emulsion 1 Drop. 1 drop in each eye once a day   Yes Provider, Historical   ipratropium-albuterol (COMBIVENT RESPIMAT)  mcg/actuation inhaler Take 1 inhalation by mouth Take As Needed daily for asthma 8/28/19  Yes Provider, Historical   atorvastatin (LIPITOR) 10 mg tablet Take 1 Tab by mouth nightly. At 10:00 pm  Indications: high cholesterol 6/2/20   April Kathleen MD   montelukast (Singulair) 10 mg tablet Take 1 Tab by mouth daily. At 8:00 am  Indications: controller medication for asthma 6/2/20   April Kathleen MD   pantoprazole (PROTONIX) 40 mg tablet Take 1 Tab by mouth daily. At 8:00 am  Indications: gastroesophageal reflux disease 6/2/20   April Kathleen MD   Januvia 50 mg tablet Take 1 Tab by mouth daily. 6/2/20   April Kathleen MD   gabapentin (Neurontin) 100 mg capsule Take 1 Cap by mouth three (3) times daily. Max Daily Amount: 300 mg. 5/18/20   Jose Atkins MD   olopatadine (PAZEO) 0.7 % drop Apply 1 Drop to eye daily. both eyes  Indications: Allergic Conjunctivitis    Provider, Historical     Allergies   Allergen Reactions    Aspirin Other (comments)    Flexeril [Cyclobenzaprine] Other (comments)     Patient states not allergic to this medication.  Iodine Swelling    Nabumetone Swelling    Penicillins Unknown (comments)     Other reaction(s):  Other (see comments)    Shellfish Containing Products Swelling    Sulfa (Sulfonamide Antibiotics) Swelling    Tramadol Itching       Patient Active Problem List    Diagnosis Date Noted    [facility]ropathy [facility] 02/27/2020    Gastroesophageal reflux disease 11/29/2019    Bipolar affective disorder ([facility] 08/19/2019    Schizophrenia ([facility] 08/19/2019    CKD (chronic kidney disease) stage 3, GFR 30-59 ml/min ([facility] 08/15/2019    Cerebral microvascular disease 05/13/2019    Gait disturbance 05/13/2019    Sickle cell trait ([facility] 05/13/2019    Frequent falls 05/13/2019    Bilateral carotid artery stenosis 05/13/2019    Type 2 diabetes mellitus with diabetic polyneuropathy, without long-term current use of insulin ([facility] 05/13/2019    Recurrent depression ([facility] 01/03/2019    Anemia of chronic disease 08/20/2018    Type 2 diabetes mellitus without complication, without long-term current use of insulin ([facility] 02/15/2018    Pure hypercholesterolemia 01/15/2018    Overactive bladder 01/15/2018    Chronic allergic rhinitis 01/15/2018    Anemia 60/86/6584    Uncomplicated asthma 20/56/2275     Current Outpatient Medications   Medication Sig Dispense Refill    buPROPion XL (WELLBUTRIN XL) 150 mg tablet Take 150 mg by mouth daily.  oxybutynin (DITROPAN) 5 mg tablet Take 1 Tab by mouth two (2) times a day. Take at 8:00 am and 10:00 pm  Indications: overactive bladder 180 Tab 3    cyanocobalamin, vitamin B-12, (VITAMIN B-12 PO) Take 2,500 mcg by mouth daily.  bisacodyL 5 mg tab Take 1 Tab by mouth daily as needed.  temazepam (RESTORIL) 15 mg capsule Take 1 Cap by mouth nightly.  simethicone (GAS-X PO) Take 125 mg by mouth daily as needed.  acetaminophen (TYLENOL) 500 mg capsule Take 1,000 mg by mouth every six (6) hours as needed for Pain (back).  sodium chloride (OCEAN) 0.65 % nasal squeeze bottle 1 [place] by Both Nostrils route as needed for Congestion.  fluticasone-vilanterol (BREO ELLIPTA) 200-25 mcg/dose inhaler Take 1 Puff by inhalation daily. Indications: Controller Medication for Asthma      cycloSPORINE (RESTASIS) 0.05 % ophthalmic emulsion 1 Drop. 1 drop in each eye once a day      ipratropium-albuterol (COMBIVENT RESPIMAT)  mcg/actuation inhaler Take 1 inhalation by mouth Take As Needed daily for asthma      atorvastatin (LIPITOR) 10 mg tablet Take 1 Tab by mouth nightly. At 10:00 pm  Indications: high cholesterol 90 Tab 3    montelukast (Singulair) 10 mg tablet Take 1 Tab by mouth daily. At 8:00 am  Indications: controller medication for asthma 90 Tab 3    pantoprazole (PROTONIX) 40 mg tablet Take 1 Tab by mouth daily.  At 8:00 am  Indications: gastroesophageal reflux disease 90 Tab 3    Januvia 50 mg tablet Take 1 Tab by mouth daily. 90 Tab 3    gabapentin (Neurontin) 100 mg capsule Take 1 Cap by mouth three (3) times daily. Max Daily Amount: 300 mg. 90 Cap 1    olopatadine (PAZEO) 0.7 % drop Apply 1 Drop to eye daily. both eyes  Indications: Allergic Conjunctivitis       Allergies   Allergen Reactions    Aspirin Other (comments)    Flexeril [Cyclobenzaprine] Other (comments)     Patient states not allergic to this medication.  Iodine Swelling    Nabumetone Swelling    Penicillins Unknown (comments)     Other reaction(s): Other (see comments)    Shellfish Containing Products Swelling    Sulfa (Sulfonamide Antibiotics) Swelling    Tramadol Itching     Past Medical History:   Diagnosis Date    Allergic rhinitis     Anemia     Asthma     Candida vaginitis     CKD (chronic kidney disease) stage 3, GFR 30-59 ml/min ([facility]     COPD (chronic obstructive pulmonary disease) ([facility]     [facility]     Diabetes ([facility]     GERD (gastroesophageal reflux disease)     h pylori gastritis egd 11/14 dr Annie Thomas    Headache     Hx of colonoscopy 11/05/2014    normal rpt 10 years, 11/2024, dr. Annie Thomas    Hypercholesterolemia     Mild dementia ([facility]     OAB (overactive bladder)     CHRIS on CPAP     Radiculopathy     Urinary incontinence     UTI (urinary tract infection)      Past Surgical History:   Procedure Laterality Date    HX COLONOSCOPY  11/05/2014    Normal/Repeat in 10 years/Dr. Donald Ordoñez     [address]    HX ORTHOPAEDIC Right 02/2012    knee replacement      History reviewed. No pertinent family history. Social History     Tobacco Use    Smoking status: Former Smoker     Years: 20.00    Smokeless tobacco: Former User     Quit date: 1/1/1999   Substance Use Topics    Alcohol use: Yes       ROS      Objective: There were no vitals taken for this visit.    General: alert, cooperative, no distress   Mental  status: normal mood, behavior, speech, dress, motor activity, and thought processes, able to follow commands   HENT: NCAT   Neck: no visualized mass   Resp: no respiratory distress   Neuro: no gross deficits   Skin: no discoloration or lesions of concern on visible areas   Psychiatric: normal affect, consistent with stated mood, no evidence of hallucinations     Additional exam findings: We discussed the expected course, resolution and complications of the diagnosis(es) in detail. Medication risks, benefits, costs, interactions, and alternatives were discussed as indicated. I advised her to contact the office if her condition worsens, changes or fails to improve as anticipated. She expressed understanding with the diagnosis(es) and plan. Omkar Garcia is a 65 y.o. female who was evaluated by a video visit encounter for concerns as above. Patient identification was verified prior to start of the visit. A caregiver was present when appropriate. Due to this being a TeleHealth encounter (During [place] [address] public health emergency), evaluation of the following organ systems was limited: Vitals/Constitutional/EENT/Resp/CV/GI//MS/Neuro/Skin/Heme-Lymph-Imm. Pursuant to the emergency declaration under the [facility] waiver authority and the [employer] and [employer] Act, this Virtual  Visit was conducted, with patient's (and/or legal guardian's) consent, to reduce the patient's risk of exposure to COVID-19 and provide necessary medical care. Services were provided through a video synchronous discussion virtually to substitute for in-person clinic visit. Patient and provider were located at their individual homes.     Assessment & Plan:     Type 2 diabetes mellitus with neuropathy(HCC)  W/ hyperglycemia  a1c 6.3>6.9>12.9  Improving FBG #'s  Advised to recheck labs soon, will have NN assist with transportation resources  On gabapentin  On mineuvia  Following endo, considering once a week insulin with her daughter  Complex case- challenging social situation as she is currently living independently, and needs medication supervision. Her daughter checks up on her  She is in contact with /[facility]/ meals on wheels/ pill pack  Urged her to move in with daughter/plan for future residency   She has transportation through her insurance  following [address] 3/18/2020  Cbc/cmp/a1c soon     Type 2 diabetes with nephropathy ([facility]     Schizophrenia, unspecified type ([address]  On wellbutrin/restoril  Ongoing care with psychiatry CPA      Bipolar affective disorder, remission status unspecified ([facility]     Anemia of chronic disease  Stable  hgb baseline 10's  Consideration of aransep   following Dr. Chance Alas nephrology     CKD 3  Avoid nsaids, renally dose medications  Monitoring        Bilateral carotid stenosis  Mild  Optimize med management  On statin, asa  Monitoring     Cerebral microvascular disease  Optimize metabolic syndrome mgt      Pure hypercholesterolemia  On lipitor  LDL goal <70      Overactive bladder  Responding to ditropan, to cont     Chronic bronchitis, unspecified chronic bronchitis type ([address]  Controlled  On ICS, prn WILLARD/ipatropium  Following pulm dr. Analilia Cabrera     GERD, unspecified esophagitis  Stable  Cont protonix     Ff-up in 3 months or sooner prn    Noe Weinstein MD         This is the Subsequent Medicare Annual Wellness Exam, performed 12 months or more after the Initial AWV or the last Subsequent AWV    Consent: Carlos Enrique Hannon, who was seen by synchronous (real-time) audio-video technology, and/or her healthcare decision maker, is aware that this patient-initiated, Telehealth encounter on 6/1/2020 is a billable service. While AWVs are fully covered by Medicare, any services rendered on this date that are not included in an AWV are subject to additional billing, with coverage as determined by her insurance carrier.  She is aware that she may receive a bill for any such additional services and has provided verbal consent to proceed: Yes. I have reviewed the patient's medical history in detail and updated the computerized patient record.      History     Patient Active Problem List   Diagnosis Code    Pure hypercholesterolemia E78.00    Overactive bladder N32.81    Chronic allergic rhinitis J30.9    Anemia A63.7    Uncomplicated asthma B86.007    Type 2 diabetes mellitus without complication, without long-term current use of insulin ([facility] E11.9    Anemia of chronic disease D63.8    Recurrent depression ([facility] F33.9    Cerebral microvascular disease I67.9    Gait disturbance R26.9    Sickle cell trait ([facility] D57.3    Frequent falls R29.6    Bilateral carotid artery stenosis I65.23    Type 2 diabetes mellitus with diabetic polyneuropathy, without long-term current use of insulin ([facility]) E11.42    CKD (chronic kidney disease) stage 3, GFR 30-59 ml/min ([facility]) N18.3    Bipolar affective disorder ([facility] F31.9    Schizophrenia ([facility] F20.9    Gastroesophageal reflux disease K21.9    Type 2 diabetes with nephropathy ([facility] E11.21    Chronic obstructive pulmonary disease ([facility]) J44.9     Past Medical History:   Diagnosis Date    Allergic rhinitis     Anemia     Asthma     Candida vaginitis     CKD (chronic kidney disease) stage 3, GFR 30-59 ml/min ([facility]     COPD (chronic obstructive pulmonary disease) ([facility]     Diabetes ([facility]     Diabetes ([facility]     GERD (gastroesophageal reflux disease)     h pylori gastritis egd 11/14 dr Nicho Dent    Headache     Hx of colonoscopy 11/05/2014    normal rpt 10 years, 11/2024, dr. Nicho Dent    Hypercholesterolemia     Mild dementia ([facility]     OAB (overactive bladder)     CHRIS on CPAP     Radiculopathy     Urinary incontinence     UTI (urinary tract infection)       Past Surgical History:   Procedure Laterality Date    HX COLONOSCOPY  11/05/2014    Normal/Repeat in 10 years/Dr. Wei Gastelum Nikita    HX HYSTERECTOMY  1970    HX ORTHOPAEDIC Right 02/2012    knee replacement      Current Outpatient Medications   Medication Sig Dispense Refill    buPROPion XL (WELLBUTRIN XL) 150 mg tablet Take 150 mg by mouth daily.  oxybutynin (DITROPAN) 5 mg tablet Take 1 Tab by mouth two (2) times a day. Take at 8:00 am and 10:00 pm  Indications: overactive bladder 180 Tab 3    cyanocobalamin, vitamin B-12, (VITAMIN B-12 PO) Take 2,500 mcg by mouth daily.  bisacodyL 5 mg tab Take 1 Tab by mouth daily as needed.  temazepam (RESTORIL) 15 mg capsule Take 1 Cap by mouth nightly.  simethicone (GAS-X PO) Take 125 mg by mouth daily as needed.  acetaminophen (TYLENOL) 500 mg capsule Take 1,000 mg by mouth every six (6) hours as needed for Pain (back).  sodium chloride (OCEAN) 0.65 % nasal squeeze bottle 1 [place] by Both Nostrils route as needed for Congestion.  fluticasone-vilanterol (BREO ELLIPTA) 200-25 mcg/dose inhaler Take 1 Puff by inhalation daily. Indications: Controller Medication for Asthma      cycloSPORINE (RESTASIS) 0.05 % ophthalmic emulsion 1 Drop. 1 drop in each eye once a day      ipratropium-albuterol (COMBIVENT RESPIMAT)  mcg/actuation inhaler Take 1 inhalation by mouth Take As Needed daily for asthma      atorvastatin (LIPITOR) 10 mg tablet Take 1 Tab by mouth nightly. At 10:00 pm  Indications: high cholesterol 90 Tab 3    montelukast (Singulair) 10 mg tablet Take 1 Tab by mouth daily. At 8:00 am  Indications: controller medication for asthma 90 Tab 3    pantoprazole (PROTONIX) 40 mg tablet Take 1 Tab by mouth daily. At 8:00 am  Indications: gastroesophageal reflux disease 90 Tab 3    Januvia 50 mg tablet Take 1 Tab by mouth daily. 90 Tab 3    gabapentin (Neurontin) 100 mg capsule Take 1 Cap by mouth three (3) times daily. Max Daily Amount: 300 mg. 90 Cap 1    olopatadine (PAZEO) 0.7 % drop Apply 1 Drop to eye daily. both eyes  Indications:  Allergic Conjunctivitis       Allergies   Allergen Reactions    Aspirin Other (comments)    Flexeril [Cyclobenzaprine] Other (comments)     Patient states not allergic to this medication.  Iodine Swelling    Nabumetone Swelling    Penicillins Unknown (comments)     Other reaction(s): Other (see comments)    Shellfish Containing Products Swelling    Sulfa (Sulfonamide Antibiotics) Swelling    Tramadol Itching       History reviewed. No pertinent family history. Social History     Tobacco Use    Smoking status: Former Smoker     Years: 20.00    Smokeless tobacco: Former User     Quit date: 1/1/1999   Substance Use Topics    Alcohol use: Yes       Depression Risk Factor Screening:     3 most recent PHQ Screens 6/1/2020   Little interest or pleasure in doing things Several days   Feeling down, depressed, irritable, or hopeless Several days   Total Score PHQ 2 2   Trouble falling or staying asleep, or sleeping too much -   Feeling tired or having little energy -   Poor appetite, weight loss, or overeating -   Feeling bad about yourself - or that you are a failure or have let yourself or your family down -   Trouble concentrating on things such as school, work, reading, or watching TV -   Moving or speaking so slowly that other people could have noticed; or the opposite being so fidgety that others notice -   Thoughts of being better off dead, or hurting yourself in some way -   PHQ 9 Score -   How difficult have these problems made it for you to do your work, take care of your home and get along with others -       Alcohol Risk Factor Screening:   Do you average 1 drink per night or more than 7 drinks a week:  No    On any one occasion in the past three months have you have had more than 3 drinks containing alcohol:  No      Functional Ability and Level of Safety:   Hearing: Hearing is good. Activities of Daily Living: The home contains: no safety equipment.   Patient needs help with:  transportation, shopping, preparing meals, managing medications and managing money    Ambulation: with no difficulty    Fall Risk:  Fall Risk Assessment, last 12 mths 2/27/2020   Able to walk? Yes   Fall in past 12 months? Yes   Fall with injury? No   Number of falls in past 12 months 3   Fall Risk Score 3       Abuse Screen:  Patient is not abused    Cognitive Screening   Has your family/caregiver stated any concerns about your memory: yes     Patient Care Team   Patient Care Team:  Vito Mcconnell MD as PCP - General (Family Practice)  Vito Mcconnell MD as PCP - [facility]  Griselda Bhat, MD (Orthopedic Surgery)  Cornell Talbot MD (Pulmonary Disease)  Rogerio Medley MD (Sleep Medicine)  Tien Toledo MD (Gastroenterology)  Jovani Warner MD (Ophthalmology)  Dave Diggs MD (Dermatology)  Radha Chan PhD (Psychiatry)  Kaylynn Berg NP (Neurology)  Sari Coombs MD (Cardiology)  Liyah De La O MD (Nephrology)  Liz Patel MD (Otolaryngology)  Matteo Hernandez as     Assessment/Plan   Education and counseling provided:  Are appropriate based on today's review and evaluation  Pneumococcal Vaccine- 13/23 completed  Influenza Vaccine- annually  Screening Mammography- due, order placed  Colorectal cancer screening tests- 11/2014 update 2024  Cardiovascular screening blood test 2/2020  Bone mass measurement (DEXA)- due order placed        Health Maintenance Due   Topic Date Due    Shingrix Vaccine Age 50> (1 of 2) 05/21/1998    Foot Exam Q1  04/10/2019    Breast Cancer Screen Mammogram  04/23/2020    A1C test (Diabetic or Prediabetic)  05/24/2020    Medicare Yearly Exam  05/13/2020       Krysta Newton is a 65 y.o. female who was evaluated by an audio-video encounter for concerns as above. Patient identification was verified prior to start of the visit. A caregiver was present when appropriate.  Due to this being a TeleHealth encounter (During [address] health emergency), evaluation of the following organ systems was limited: Vitals/Constitutional/EENT/Resp/CV/GI//MS/Neuro/Skin/Heme-Lymph-Imm. Pursuant to the emergency declaration under the [address] [place] [address], [facility] waiver authority and the [employer] and [employer] Act, this Virtual Visit was conducted, with patient's (and/or legal guardian's) consent, to reduce the patient's risk of exposure to COVID-19 and provide necessary medical care. Services were provided through a synchronous discussion virtually to substitute for in-person clinic visit. I was in the office. The patient was at home.       Brian Swanson MD

## 2020-06-05 RX ORDER — GABAPENTIN 100 MG/1
100 CAPSULE ORAL 3 TIMES DAILY
Qty: 90 CAP | Refills: 1 | OUTPATIENT
Start: 2020-06-05

## 2020-06-05 NOTE — TELEPHONE ENCOUNTER
Yolande Butt is requesting a new Rx  Previous Rx was sent to [facility]    Last Visit: 5/18/20 with MD Maury Fox  Next Appointment: Advised to follow-up in 4 weeks  Previous Refill Encounter(s): 5/18/20 #90 with 1 refill    Requested Prescriptions     Pending Prescriptions Disp Refills    gabapentin (Neurontin) 100 mg capsule 90 Cap 1     Sig: Take 1 Cap by mouth three (3) times daily. Max Daily Amount: 300 mg.

## 2020-06-15 ENCOUNTER — TELEPHONE (OUTPATIENT)
Dept: FAMILY MEDICINE CLINIC | Age: 70
End: 2020-06-15

## 2020-06-15 NOTE — TELEPHONE ENCOUNTER
Received call from patient requesting PharmD return call. Returned call to patient. She states around 5-6 days ago she started \"having a lot of problems\". States she started staggering a lot and having swelling in her legs and feet and headaches. States she feels bloated and is having weakness. States she is also having heartburn, confusion, and dizziness. States she stopped all of her medications 2 days ago. States she was told by her mail order pharmacy that they will not fill temazepam until psychiatrist sends in form for her. Discussed with patient that she should not stop medications abruptly without contacting her physicians, as there is risk of withdrawal symptoms especially with her psychiatric medications. Discussed with patient that PharmD will reach out to her PCP regarding her symptoms. Also recommended that patient reach out to her psychiatrist for follow up and plan for psychiatric medications. Discussed the above with PCP's nurse and will route note to PCP.     Fatou Roberts, PharmD  Clinical Pharmacist Specialist

## 2020-06-16 ENCOUNTER — VIRTUAL VISIT (OUTPATIENT)
Dept: FAMILY MEDICINE CLINIC | Age: 70
End: 2020-06-16

## 2020-06-16 DIAGNOSIS — J44.9 CHRONIC OBSTRUCTIVE PULMONARY DISEASE, UNSPECIFIED COPD TYPE (HCC): ICD-10-CM

## 2020-06-16 DIAGNOSIS — M79.10 MYALGIA: Primary | ICD-10-CM

## 2020-06-16 DIAGNOSIS — F31.9 BIPOLAR AFFECTIVE DISORDER, REMISSION STATUS UNSPECIFIED (HCC): ICD-10-CM

## 2020-06-16 DIAGNOSIS — I65.23 BILATERAL CAROTID ARTERY STENOSIS: ICD-10-CM

## 2020-06-16 DIAGNOSIS — F20.9 SCHIZOPHRENIA, UNSPECIFIED TYPE (HCC): ICD-10-CM

## 2020-06-16 DIAGNOSIS — J30.9 CHRONIC ALLERGIC RHINITIS: ICD-10-CM

## 2020-06-16 DIAGNOSIS — H53.469 CVA, OLD, HOMONYMOUS HEMIANOPSIA: ICD-10-CM

## 2020-06-16 DIAGNOSIS — R42 DIZZINESS: ICD-10-CM

## 2020-06-16 DIAGNOSIS — N18.30 CKD (CHRONIC KIDNEY DISEASE) STAGE 3, GFR 30-59 ML/MIN (HCC): ICD-10-CM

## 2020-06-16 DIAGNOSIS — I69.398 CVA, OLD, HOMONYMOUS HEMIANOPSIA: ICD-10-CM

## 2020-06-16 DIAGNOSIS — K21.9 GASTROESOPHAGEAL REFLUX DISEASE, ESOPHAGITIS PRESENCE NOT SPECIFIED: ICD-10-CM

## 2020-06-16 DIAGNOSIS — E11.21 TYPE 2 DIABETES WITH NEPHROPATHY (HCC): ICD-10-CM

## 2020-06-16 RX ORDER — PRAVASTATIN SODIUM 20 MG/1
20 TABLET ORAL
Qty: 90 TAB | Refills: 0 | Status: SHIPPED | OUTPATIENT
Start: 2020-06-16 | End: 2020-09-18 | Stop reason: SDUPTHER

## 2020-06-16 NOTE — PROGRESS NOTES
Amanda Gamboa is a 65 y.o. female who was seen by synchronous (real-time) audio-video technology on 6/16/2020. Consent: Amanda Gamboa, who was seen by synchronous (real-time) audio-video technology, and/or her healthcare decision maker, is aware that this patient-initiated, Telehealth encounter on 6/16/2020 is a billable service, with coverage as determined by her insurance carrier. She is aware that she may receive a bill and has provided verbal consent to proceed: Yes. 712  Subjective: Amanda Gamboa is a 65 y.o. female who was seen for No chief complaint on file. Multiple concerns mostly regarding all her medications    Reports few days ago, she had one day where in she had a constellation of symptoms of headache, dizziness, achiness of bilateral legs, post nasal drip/dry mouth. She felt weak, slept longer than usual.  She started reading medications she is taking and thought these may have been cumulative side effects as she has been taking medications for a number of years. She says she is feeling back to normal now that she has stopped all her meds, more importantly concerned about her kidneys with sibling's h/o renal failure, specifically concerned regarding [place] and [place]. Did a thorough med review today    DM w/ nephropathy and neuropathy-  a1c 6.9>12.6. She is on renally dosed [place] and [place]. She is supposed to be following with   Ellis Moore, and was recommended against insulin due to safety concern with administration. She has standing order for repeat labs and reminded patient to have this done to reevaluate kidney function and DM control. Dizziness/headache- no longer with symptoms off all her medication. Denies focal neuro deficit limb weakness, facial droop, slurring of speech, blurring of vision. This is a recurrent complaint MRI 5/2019 without any acute findings.    Discussed ditropan may cause dizziness, however she says she absolutely needs this for her OAB and wants to cont    achiness of legs-s he has chronic lumbar stenosis, and says she is not taking gabapentin, considering to receive epidural injections instead. Following dr. Delta Fung. She is on lipitor, which can contribute to muscle aches, she has taken this for years and ideally I would want to continue. We agreed on switching to pravachol and see if this helps her pain    Chronic rhinitis- persistent,  she is has h/o epistaxis on nasal steroid spray. She has limited transportation for allergy testing/shots. She is currently singulair. I'm afraid there is not a lot options with this complaint. COPD/asthma- she has been on breo for years and doubt contributing to symptoms    GERD- on protonix    Schizophrenia/bipolar/insomnia- restoril may contribute to excessive drowsiness and advised to hold this and reach out to her psychiatrist regarding other options. Prior to Admission medications    Medication Sig Start Date End Date Taking? Authorizing Provider   pravastatin (PRAVACHOL) 20 mg tablet Take 1 Tab by mouth nightly. 6/16/20  Yes Fiordaliza Baez MD   montelukast (Singulair) 10 mg tablet Take 1 Tab by mouth daily. At 8:00 am  Indications: controller medication for asthma 6/2/20  Yes Krissy Hampton MD   pantoprazole (PROTONIX) 40 mg tablet Take 1 Tab by mouth daily. At 8:00 am  Indications: gastroesophageal reflux disease 6/2/20  Yes Krissy Hampton MD   Januvia 50 mg tablet Take 1 Tab by mouth daily. 6/2/20  Yes Krissy Hampton MD   buPROPion XL (WELLBUTRIN XL) 150 mg tablet Take 150 mg by mouth daily. 5/9/20  Yes Provider, Historical   oxybutynin (DITROPAN) 5 mg tablet Take 1 Tab by mouth two (2) times a day. Take at 8:00 am and 10:00 pm  Indications: overactive bladder 5/21/20  Yes Fiordaliza Baez MD   cyanocobalamin, vitamin B-12, (VITAMIN B-12 PO) Take 2,500 mcg by mouth daily. Yes Provider, Historical   bisacodyL 5 mg tab Take 1 Tab by mouth daily as needed.    Yes Provider, Historical   simethicone (GAS-X PO) Take 125 mg by mouth daily as needed. Yes Provider, Historical   acetaminophen (TYLENOL) 500 mg capsule Take 1,000 mg by mouth every six (6) hours as needed for Pain (back). Yes Provider, Historical   olopatadine (PAZEO) 0.7 % drop Apply 1 Drop to eye daily. both eyes  Indications: Allergic Conjunctivitis   Yes Provider, Historical   sodium chloride (OCEAN) 0.65 % nasal squeeze bottle 1 [place] by Both Nostrils route as needed for Congestion. Yes Provider, Historical   fluticasone-vilanterol (BREO ELLIPTA) 200-25 mcg/dose inhaler Take 1 Puff by inhalation daily. Indications: Controller Medication for Asthma   Yes Provider, Historical   cycloSPORINE (RESTASIS) 0.05 % ophthalmic emulsion 1 Drop. 1 drop in each eye once a day   Yes Provider, Historical   ipratropium-albuterol (COMBIVENT RESPIMAT)  mcg/actuation inhaler Take 1 inhalation by mouth Take As Needed daily for asthma 8/28/19  Yes Provider, Historical   atorvastatin (LIPITOR) 10 mg tablet Take 1 Tab by mouth nightly. At 10:00 pm  Indications: high cholesterol 6/2/20 6/16/20  Kuldip Funk MD   gabapentin (Neurontin) 100 mg capsule Take 1 Cap by mouth three (3) times daily. Max Daily Amount: 300 mg. 5/18/20 6/16/20  Obdulio Atkins MD   temazepam (RESTORIL) 15 mg capsule Take 1 Cap by mouth nightly. 3/27/20   Provider, Historical     Allergies   Allergen Reactions    Aspirin Other (comments)    Flexeril [Cyclobenzaprine] Other (comments)     Patient states not allergic to this medication.  Iodine Swelling    Nabumetone Swelling    Penicillins Unknown (comments)     Other reaction(s):  Other (see comments)    Shellfish Containing Products Swelling    Sulfa (Sulfonamide Antibiotics) Swelling    Tramadol Itching       Patient Active Problem List    Diagnosis Date Noted    [facility] obstructive pulmonary disease [facility] 06/04/2020    [facility]ropathy [facility] 02/27/2020    Gastroesophageal reflux disease 11/29/2019    Bipolar affective disorder ([facility] 08/19/2019    Schizophrenia ([facility] 08/19/2019    CKD (chronic kidney disease) stage 3, GFR 30-59 ml/min ([facility] 08/15/2019    Cerebral microvascular disease 05/13/2019    Gait disturbance 05/13/2019    Sickle cell trait ([facility] 05/13/2019    Frequent falls 05/13/2019    Bilateral carotid artery stenosis 05/13/2019    Type 2 diabetes mellitus with diabetic polyneuropathy, without long-term current use of insulin ([facility] 05/13/2019    Recurrent depression ([facility] 01/03/2019    Anemia of chronic disease 08/20/2018    Type 2 diabetes mellitus without complication, without long-term current use of insulin ([facility] 02/15/2018    Pure hypercholesterolemia 01/15/2018    Overactive bladder 01/15/2018    Chronic allergic rhinitis 01/15/2018    Anemia 80/34/6080    Uncomplicated asthma 88/43/1701     Current Outpatient Medications   Medication Sig Dispense Refill    pravastatin (PRAVACHOL) 20 mg tablet Take 1 Tab by mouth nightly. 90 Tab 0    montelukast (Singulair) 10 mg tablet Take 1 Tab by mouth daily. At 8:00 am  Indications: controller medication for asthma 90 Tab 3    pantoprazole (PROTONIX) 40 mg tablet Take 1 Tab by mouth daily. At 8:00 am  Indications: gastroesophageal reflux disease 90 Tab 3    Januvia 50 mg tablet Take 1 Tab by mouth daily. 90 Tab 3    buPROPion XL (WELLBUTRIN XL) 150 mg tablet Take 150 mg by mouth daily.  oxybutynin (DITROPAN) 5 mg tablet Take 1 Tab by mouth two (2) times a day. Take at 8:00 am and 10:00 pm  Indications: overactive bladder 180 Tab 3    cyanocobalamin, vitamin B-12, (VITAMIN B-12 PO) Take 2,500 mcg by mouth daily.  bisacodyL 5 mg tab Take 1 Tab by mouth daily as needed.  simethicone (GAS-X PO) Take 125 mg by mouth daily as needed.  acetaminophen (TYLENOL) 500 mg capsule Take 1,000 mg by mouth every six (6) hours as needed for Pain (back).       olopatadine (PAZEO) 0.7 % drop Apply 1 Drop to eye daily. both eyes  Indications: Allergic Conjunctivitis      sodium chloride (OCEAN) 0.65 % nasal squeeze bottle 1 [place] by Both Nostrils route as needed for Congestion.  fluticasone-vilanterol (BREO ELLIPTA) 200-25 mcg/dose inhaler Take 1 Puff by inhalation daily. Indications: Controller Medication for Asthma      cycloSPORINE (RESTASIS) 0.05 % ophthalmic emulsion 1 Drop. 1 drop in each eye once a day      ipratropium-albuterol (COMBIVENT RESPIMAT)  mcg/actuation inhaler Take 1 inhalation by mouth Take As Needed daily for asthma      temazepam (RESTORIL) 15 mg capsule Take 1 Cap by mouth nightly. Allergies   Allergen Reactions    Aspirin Other (comments)    Flexeril [Cyclobenzaprine] Other (comments)     Patient states not allergic to this medication.  Iodine Swelling    Nabumetone Swelling    Penicillins Unknown (comments)     Other reaction(s): Other (see comments)    Shellfish Containing Products Swelling    Sulfa (Sulfonamide Antibiotics) Swelling    Tramadol Itching     Past Medical History:   Diagnosis Date    Allergic rhinitis     Anemia     Asthma     Candida vaginitis     CKD (chronic kidney disease) stage 3, GFR 30-59 ml/min [facility]     COPD (chronic obstructive pulmonary disease) ([facility]     Diabetes ([address]     Diabetes ([facility]     GERD (gastroesophageal reflux disease)     h pylori gastritis egd 11/14 dr Chris Schultz    Headache     Hx of colonoscopy 11/05/2014    normal rpt 10 years, 11/2024, dr. Chris Schultz    Hypercholesterolemia     Mild dementia ([address]     OAB (overactive bladder)     CHRIS on CPAP     Radiculopathy     Urinary incontinence     UTI (urinary tract infection)      Past Surgical History:   Procedure Laterality Date    HX COLONOSCOPY  11/05/2014    Normal/Repeat in 10 years/Dr. Richard Osman    HX HYSTERECTOMY  1970    HX ORTHOPAEDIC Right 02/2012    knee replacement      No family history on file.   Social History     Tobacco Use    Smoking status: Former Smoker     Years: 20.00    Smokeless tobacco: Former User     Quit date: 1/1/1999   Substance Use Topics    Alcohol use: Yes       ROS      Objective: There were no vitals taken for this visit. General: alert, cooperative, no distress   Mental  status: normal mood, behavior, speech, dress, motor activity, and thought processes, able to follow commands   HENT: NCAT   Neck: no visualized mass   Resp: no respiratory distress   Neuro: no gross deficits   Skin: no discoloration or lesions of concern on visible areas   Psychiatric: normal affect, consistent with stated mood, no evidence of hallucinations     Additional exam findings: We discussed the expected course, resolution and complications of the diagnosis(es) in detail. Medication risks, benefits, costs, interactions, and alternatives were discussed as indicated. I advised her to contact the office if her condition worsens, changes or fails to improve as anticipated. She expressed understanding with the diagnosis(es) and plan. Clark Whyte is a 65 y.o. female who was evaluated by a video visit encounter for concerns as above. Patient identification was verified prior to start of the visit. A caregiver was present when appropriate. Due to this being a TeleHealth encounter (During BENNR-08 public health emergency), evaluation of the following organ systems was limited: Vitals/Constitutional/EENT/Resp/CV/GI//MS/Neuro/Skin/Heme-Lymph-Imm. Pursuant to the emergency declaration under the [facility], 1135 waiver authority and the [employer] General Act, this Virtual  Visit was conducted, with patient's (and/or legal guardian's) consent, to reduce the patient's risk of exposure to COVID-19 and provide necessary medical care. Services were provided through a video synchronous discussion virtually to substitute for in-person clinic visit. Patient and provider were located at their individual homes. Assessment & Plan:   Diagnoses and all orders for this visit:    1. Myalgia  Likely due to lumbar stenosis  We can try to switch her lipitor to pravachol if this improved her symptoms    2. Dizziness  Chronic, intermittent, no alarming s/sx  Symptom free currently  MRI 5/2019 neg  She is currently off gabapentin, I am unsure of restoril is contributing, advised to hold and reach out to her psychiatrist regarding other options  Keep upcoming appt with neurologist to further evaluate this along with vascular dementia care    3. Bipolar affective disorder, remission status unspecified ([facility]    4. Schizophrenia, unspecified type ([facility] [facility]    5. CKD (chronic kidney disease) stage 3, GFR 30-59 ml/min ([facility])  Advised to recheck GFR, has standing lab orders  Discussed her Alexus Stanford is renally dosed and importance of DM control    6. Gastroesophageal reflux disease, esophagitis presence not specified  On protonix    7. Type 2 diabetes with nephropathy ([facility])  a1c 6.3>6.9>12.9  Improving FBG #'s  Advised to recheck labs soon, will have NN assist with transportation resources  On januvia  Following endo, considering once a week insulin with her daughter  Complex case- challenging social situation as she is currently living independently, and needs medication supervision. Her daughter checks up on her  She is in contact with /[facility]/ meals on wheels/ pill pack  Urged her to move in with daughter/plan for future residency   She has transportation through her insurance  following [address] 3/18/2020  Cbc/cmp/a1c soon    8. CVA, old, homonymous hemianopsia  Switch lipitor to pravachol, see above   LDL goal <70    9. Bilateral carotid artery stenosis    10. Chronic obstructive pulmonary disease, unspecified COPD type ([facility]  Controlled  On ICS, prn WILLARD/ipatropium  Following pulm dr. Analilia Cabrera  11.  Chronic allergic rhinitis  she is has h/o epistaxis on nasal steroid spray. She has limited transportation for allergy testing/shots. She is currently singulair. I'm afraid there is not a lot options with this complaint. 12.  Overactive bladder  Discussed this may contribute to dizziness, she wants to cont medication as she is very symptomatic off of it  Responding to ditropan, to cont  Other orders  -     pravastatin (PRAVACHOL) 20 mg tablet; Take 1 Tab by mouth nightly.     Ff-up in 6 weeks or sooner prn    I spent at least 55 minutes on this visit with this established patient. ([id number])  Jadyn Dupree MD

## 2020-06-19 NOTE — PATIENT INSTRUCTIONS
A Healthy Heart: Care Instructions Your Care Instructions Coronary artery disease, also called heart disease, occurs when a substance called plaque builds up in the vessels that supply oxygen-rich blood to your heart muscle. This can narrow the blood vessels and reduce blood flow. A heart attack happens when blood flow is completely blocked. A high-fat diet, smoking, and other factors increase the risk of heart disease. Your doctor has found that you have a chance of having heart disease. You can do lots of things to keep your heart healthy. It may not be easy, but you can change your diet, exercise more, and quit smoking. These steps really work to lower your chance of heart disease. Follow-up care is a key part of your treatment and safety. Be sure to make and go to all appointments, and call your doctor if you are having problems. It's also a good idea to know your test results and keep a list of the medicines you take. How can you care for yourself at home? Diet · Use less salt when you cook and eat. This helps lower your blood pressure. Taste food before salting. Add only a little salt when you think you need it. With time, your taste buds will adjust to less salt. · Eat fewer snack items, fast foods, canned soups, and other high-salt, high-fat, processed foods. · Read food labels and try to avoid saturated and trans fats. They increase your risk of heart disease by raising cholesterol levels. · Limit the amount of solid fat-butter, margarine, and shortening-you eat. Use olive, peanut, or canola oil when you cook. Bake, broil, and steam foods instead of frying them. · Eat a variety of fruit and vegetables every day. Dark green, deep orange, red, or yellow fruits and vegetables are especially good for you. Examples include spinach, carrots, peaches, and berries.  
· Foods high in fiber can reduce your cholesterol and provide important vitamins and minerals. High-fiber foods include whole-grain cereals and breads, oatmeal, beans, brown rice, citrus fruits, and apples. · Eat lean proteins. Heart-healthy proteins include seafood, lean meats and poultry, eggs, beans, peas, nuts, seeds, and soy products. · Limit drinks and foods with added sugar. These include candy, desserts, and soda pop. Lifestyle changes · If your doctor recommends it, get more exercise. Walking is a good choice. Bit by bit, increase the amount you walk every day. Try for at least 30 minutes on most days of the week. You also may want to swim, bike, or do other activities. · Do not smoke. If you need help quitting, talk to your doctor about stop-smoking programs and medicines. These can increase your chances of quitting for good. Quitting smoking may be the most important step you can take to protect your heart. It is never too late to quit. · Limit alcohol to 2 drinks a day for men and 1 drink a day for women. Too much alcohol can cause health problems. · Manage other health problems such as diabetes, high blood pressure, and high cholesterol. If you think you may have a problem with alcohol or drug use, talk to your doctor. Medicines · Take your medicines exactly as prescribed. Call your doctor if you think you are having a problem with your medicine. · If your doctor recommends aspirin, take the amount directed each day. Make sure you take aspirin and not another kind of pain reliever, such as acetaminophen (Tylenol). When should you call for help? [id number] if you have symptoms of a heart attack. These may include: · Chest pain or pressure, or a strange feeling in the chest. 
· Sweating. · Shortness of breath. · Pain, pressure, or a strange feeling in the back, neck, jaw, or upper belly or in one or both shoulders or arms. · Lightheadedness or sudden weakness. · A fast or irregular heartbeat. After you call 911, the  may tell you to chew 1 adult-strength or 2 to 4 low-dose aspirin. Wait for an ambulance. Do not try to drive yourself. Watch closely for changes in your health, and be sure to contact your doctor if you have any problems. Where can you learn more? Go to http://danika-sophia.info/ Enter S906 in the search box to learn more about \"A Healthy Heart: Care Instructions. \" Current as of: December 16, 2019               Content Version: 12.5 © 3638-8998 [employer]. Care instructions adapted under license by [employer] (which disclaims liability or warranty for this information). If you have questions about a medical condition or this instruction, always ask your healthcare professional. [address] any warranty or liability for your use of this information.

## 2020-07-01 ENCOUNTER — TELEPHONE (OUTPATIENT)
Dept: INTERNAL MEDICINE CLINIC | Age: 70
End: 2020-07-01

## 2020-07-01 NOTE — TELEPHONE ENCOUNTER
Called patient for PharmD telephone call for diabetes follow up. Patient states she forgot about appointment today and requests to reschedule appointment. Rescheduled telephone appointment to 7/6 per patient's request.    Patient states she is still having a few headaches, but overall her symptoms from last PCP visit have improved. She states BG are ranging 100s-120s and she is taking all of her medications except Restoril, which her PCP instructed her to hold. Reiterated importance of medication compliance. Patient expressed understanding and had no further questions.     Katherin Gamez, PharmD  Clinical Pharmacist Specialist

## 2020-07-02 NOTE — TELEPHONE ENCOUNTER
Patient identified with 2 identifiers (name and ). Patient was reminded of appt with Dr. Meredith Sterling 2020 at 11:00 am. To evaluate headaches. Patient verbalizes understanding.

## 2020-07-06 ENCOUNTER — VIRTUAL VISIT (OUTPATIENT)
Dept: FAMILY MEDICINE CLINIC | Age: 70
End: 2020-07-06

## 2020-07-06 ENCOUNTER — TELEPHONE (OUTPATIENT)
Dept: FAMILY MEDICINE CLINIC | Age: 70
End: 2020-07-06

## 2020-07-06 NOTE — TELEPHONE ENCOUNTER
Called patient for PharmD telephone appointment for diabetes follow up today. Patient requested to reschedule appointment to 2 weeks from now.     Rescheduled telephone appointment to 7/20 per her request.    Felipe Sifuentes, PharmD  Clinical Pharmacist Specialist

## 2020-07-07 ENCOUNTER — TELEPHONE (OUTPATIENT)
Dept: INTERNAL MEDICINE CLINIC | Age: 70
End: 2020-07-07

## 2020-07-07 NOTE — TELEPHONE ENCOUNTER
Returned call to patient. Patient asked about upcoming appointments. Discussed that she has upcoming appointments with Neurology on 7/9 and Radiology on 7/20. Discussed that next PharmD telephone appointment on 7/20. Patient asking for additional information on Neurology and Radiology appointments. Directed her to call Dr. Nelia Alvarez office and Radiology for details. Patient expressed understanding and had no further questions.     Rohini Neri, PharmD  Clinical Pharmacist Specialist

## 2020-07-20 ENCOUNTER — VIRTUAL VISIT (OUTPATIENT)
Dept: FAMILY MEDICINE CLINIC | Age: 70
End: 2020-07-20

## 2020-07-20 DIAGNOSIS — E11.21 TYPE 2 DIABETES WITH NEPHROPATHY (HCC): Primary | ICD-10-CM

## 2020-07-20 RX ORDER — SALIVA STIMULANT COMB. NO.3
1 SPRAY, NON-AEROSOL (ML) MUCOUS MEMBRANE AS NEEDED
COMMUNITY
End: 2021-05-07

## 2020-07-20 RX ORDER — LIDOCAINE 4 G/100G
1 PATCH TOPICAL EVERY 24 HOURS
COMMUNITY
End: 2021-01-22 | Stop reason: ALTCHOICE

## 2020-07-20 RX ORDER — FLUORIDE TOOTHPASTE
15 TOOTHPASTE DENTAL AS NEEDED
COMMUNITY
End: 2021-03-19 | Stop reason: SDUPTHER

## 2020-07-20 NOTE — PROGRESS NOTES
----- Message from [address] [place] [address]. Gaurav Ramirez sent at 4/13/2020 11:47 AM EDT -----  Regarding: Prescription Question  Contact: [phone]  I am requesting a refill on Cyclobenzaprine 10mg for severe muscle spasms. Pharmacy Note:  Diabetes Follow-up    S/O: Placed an outgoing call to patient to follow-up on SMBG readings and diabetes. Patient referred by Catherine Chen MD for diabetes management. Discussed with patient the Pharmacist Collaborative Practice Agreement. Patient provided verbal and/or electronic (ex. mychart) consent to participate in the collaborative practice agreement between the pharmacist and referred patient. This is in lieu of paper consent due to COVID-19 precautions and the use of remote/virtual visits. Patient is currently taking the following for diabetes: Januvia 50 mg daily. She denies any signs/sx of hypoglycemia. Denies any BG <70. Last A1C was 12.3 on 2/24/2020. Denies any signs of hyperglycemia.     Patient is checking Her blood sugar 1-2 times daily and reports the following values:  Date Fasting After Lunch   7/20 130    7/19 136    7/18 157 (ate late at night) 136   7/17 130    7/16 130    7/15 134    7/14 135    -States BG usually 130s fasting unless she ate something heavy or ate sweets the night before  -States she has appointment with Tere Marin (endocrinology) in August    Diet/Exercise:  -States she is still getting meals from wheels - 2 meals per day  -States her cousin helps take her to market for food sometimes    Medication Adherence:  -Patient reports adherence with medications  -Patient denies adverse effects from medications  -States she has been having trouble with constipation; states she has been taking a laxative OTC every day but can't remember which medication it is  -States she has been having dry eyes - states she takes P[place] eye drops once daily  -States she is getting medications in compliance packaging through [facility] mail order  -States she has swelling and pain in her legs and feet - mainly her right one   -States she will try to see her foot specialist    Screenings:  Diabetic Foot and Eye Exam HM Status   Topic Date Due    Diabetic Foot Care This was last prescribed by pain management physician Dr. Thea Nuñez. Please sign if appropriate. Last Visit: 3/5/20 with NP Amber Veras  Next Appointment: f/u in 4 months  Previous Refill Encounter(s): 9/10/19 #90 with 3 refills    Requested Prescriptions     Pending Prescriptions Disp Refills    cyclobenzaprine (FLEXERIL) 10 mg tablet 90 Tab 3     Sig: Take 1 Tab by mouth three (3) times daily as needed for Muscle Spasm(s). 04/10/2019    Eye Exam  01/23/2022       Current Outpatient Medications   Medication Sig    pravastatin (PRAVACHOL) 20 mg tablet Take 1 Tab by mouth nightly.  montelukast (Singulair) 10 mg tablet Take 1 Tab by mouth daily. At 8:00 am  Indications: controller medication for asthma    pantoprazole (PROTONIX) 40 mg tablet Take 1 Tab by mouth daily. At 8:00 am  Indications: gastroesophageal reflux disease    Januvia 50 mg tablet Take 1 Tab by mouth daily.  buPROPion XL (WELLBUTRIN XL) 150 mg tablet Take 150 mg by mouth daily.  oxybutynin (DITROPAN) 5 mg tablet Take 1 Tab by mouth two (2) times a day. Take at 8:00 am and 10:00 pm  Indications: overactive bladder    cyanocobalamin, vitamin B-12, (VITAMIN B-12 PO) Take 2,500 mcg by mouth daily.  bisacodyL 5 mg tab Take 1 Tab by mouth daily as needed.  temazepam (RESTORIL) 15 mg capsule Take 1 Cap by mouth nightly.  simethicone (GAS-X PO) Take 125 mg by mouth daily as needed.  acetaminophen (TYLENOL) 500 mg capsule Take 1,000 mg by mouth every six (6) hours as needed for Pain (back).  olopatadine (PAZEO) 0.7 % drop Apply 1 Drop to eye daily. both eyes  Indications: Allergic Conjunctivitis    sodium chloride (OCEAN) 0.65 % nasal squeeze bottle 1 [place] by Both Nostrils route as needed for Congestion.  fluticasone-vilanterol (BREO ELLIPTA) 200-25 mcg/dose inhaler Take 1 Puff by inhalation daily. Indications: Controller Medication for Asthma    cycloSPORINE (RESTASIS) 0.05 % ophthalmic emulsion 1 Drop. 1 drop in each eye once a day    ipratropium-albuterol (COMBIVENT RESPIMAT)  mcg/actuation inhaler Take 1 inhalation by mouth Take As Needed daily for asthma     No current facility-administered medications for this visit.         Lab Results   Component Value Date/Time    Hemoglobin A1c 12.3 (H) 02/24/2020 11:41 AM    Hemoglobin A1c (POC) 6.3 08/15/2019 11:49 AM       Lab Results   Component Value Date/Time    Sodium 144 02/24/2020 11:41 AM    Potassium 3.9 02/24/2020 11:41 AM    Chloride 110 02/24/2020 11:41 AM    CO2 30 02/24/2020 11:41 AM    Anion gap 4 02/24/2020 11:41 AM    Glucose 180 (H) 02/24/2020 11:41 AM    BUN 15 02/24/2020 11:41 AM    Creatinine 1.50 (H) 02/24/2020 11:41 AM    BUN/Creatinine ratio 10 (L) 02/24/2020 11:41 AM    GFR est AA 41 (L) 02/24/2020 11:41 AM    GFR est non-AA 34 (L) 02/24/2020 11:41 AM    Calcium 9.2 02/24/2020 11:41 AM       Lab Results   Component Value Date/Time    Cholesterol, total 193 02/24/2020 11:41 AM    HDL Cholesterol 95 (H) 02/24/2020 11:41 AM    LDL, calculated 81.8 02/24/2020 11:41 AM    Triglyceride 81 02/24/2020 11:41 AM    CHOL/HDL Ratio 2.0 02/24/2020 11:41 AM       A/P:    Diabetes:  -Instructed patient to continue Januvia 50 mg daily  -Reminded patient she is due for labwork (A1C)  -Reiterated importance of checking BG 1-2 times daily and following with endocrinology for her BG  -Will discuss patient's foot swelling with PCP   -Instructed patient to call PCP if this persists    Reviewed with patient signs/sx/treatment of hypoglycemia. Patient verbalized understanding. Will sign off on patient since BG have been controlled at last 2 visits and patient is following with endocrinology. Will route note to PCP.     Maciej Preston, PharmD  Clinical Pharmacist Specialist    CLINICAL PHARMACY CONSULT: MED RECONCILIATION/REVIEW ADDENDUM    For Pharmacy Admin Tracking Only    PHSO: PHSO Patient?: No  Total # of Interventions Recommended: Count: 0  Total Interventions Accepted: 0  Time Spent (min): [address] [facility], [facility], PharmD Never

## 2020-07-20 NOTE — Clinical Note
Chavez Cortez,    I spoke with Ms. Jayde Thornton on Monday. BG have remained controlled at 130s-150s on januvia 50 mg daily. She is continuing to get her medications in compliance packaging through [facility] mail order pharmacy. She also has appointment for diabetes follow up with Blanche Espinal in August per her report. Since she has all her medications and BG remains 130s, I will sign off on her and have her follow up with amanda for diabetes. Of note, she was also having some swelling in her legs and feet. She states she will try to follow up with her foot specialist. I also instructed her to contact you to follow up if this persists.    Dinorah eDluna, PharmD  Clinical Pharmacist Specialist

## 2020-07-25 ENCOUNTER — TELEPHONE (OUTPATIENT)
Dept: FAMILY MEDICINE CLINIC | Age: 70
End: 2020-07-25

## 2020-07-25 NOTE — TELEPHONE ENCOUNTER
[facility] to talk to [facility]. Patient has had 2 episodes of epistaxis overnight. She has done supportive care with compression and applying cold compress and the nose bleeding has stopped. I discussed nose bleeding and possible causes. If the nose bleeding recurs and is intractable she should go to the ED for evaluation and nasal packing. She says the bleeding has stopped. She should avoid picking the nose. Patient verbalized understanding and agreement to the plan.     Yandel Hawkins MD

## 2020-07-27 ENCOUNTER — TELEPHONE (OUTPATIENT)
Dept: FAMILY MEDICINE CLINIC | Age: 70
End: 2020-07-27

## 2020-07-27 DIAGNOSIS — R04.0 EPISTAXIS: Primary | ICD-10-CM

## 2020-07-27 NOTE — TELEPHONE ENCOUNTER
Pt states that she had to go to [facility] on 7/25/2020 for her nose bleeds. She states that they put her through a lot of test. She states that nothing was found as the reason for the nose bleed. Pt told them that she has had bad headaches, weakness and that she bled more that the last time. She states that it had started earlier in the day but she was able to stop it and then it started up again later that evening. She states that she was told to call you to advise.

## 2020-07-28 RX ORDER — OXYBUTYNIN CHLORIDE 5 MG/1
5 TABLET ORAL 2 TIMES DAILY
Qty: 180 TAB | Refills: 3 | Status: ON HOLD | OUTPATIENT
Start: 2020-07-28

## 2020-07-28 NOTE — TELEPHONE ENCOUNTER
Pt called and would [facility] [phone]   Requested Prescriptions     Pending Prescriptions Disp Refills    oxybutynin (DITROPAN) 5 mg tablet 180 Tab 3     Sig: Take 1 Tab by mouth two (2) times a day.  Take at 8:00 am and 10:00 pm  Indications: overactive bladder    lov 6/16/2020  ucv n/a

## 2020-07-28 NOTE — TELEPHONE ENCOUNTER
Patient identified with 2 identifiers (name and ). Patient aware that Dr. Michael Pritchard has placed order for ENT Dr. Izzy Urias. Patient is aware Dr. Krysta Amato office will contact patient for appt.

## 2020-08-17 NOTE — PROGRESS NOTES
Dawn Singer, 69 y.o.,  female    SUBJECTIVE  Ff-up     Home health has been instituted, and she is working with PT with gait training with history of recurrent falls, chronic neck pain, feet neuropathy. C/o frontal headaches, lasting for a few hours. Similar to her previous migraines, with light and noise sensitivity. She reports tylenol is helping prn. She says stress is a known trigger, and has been having more of anxiety recently. She follows psychiatrist for depression, missed their last appt, moved to october. Her daughter reports history of schizophrenia and bipolar disorder. No current hallucinations or manic symptoms. NIDDM w/ nephropathy/ neuropathy is about the same, refractory to neurontin. She is taking [place] and [place]. She has chronic anemia thought to be due to anemia of chronic disease. She reports GI work up 2014 normal.VOA notes state likely due to renal insufficiency, to consider aranesp, she has not followed through with this. HL- taking lipitor    OAB- taking oxybutynin and responding well to this. GERD- on prilosec. Chronic neck pain with radiculopathy- reports following dr. Angelica Reza for this, currently on flexeril and norco prn. 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 She has upcoming appt with dr. Winter Toussaint for 2nd opinion and see how best we can avoid falls. Asthma-doing well she says, on ICS and LAMA for maintenance,  following Dr. Juan Louis    CHRIS on CPAP- reports consistent use,  following  Dr. Yonatan West    No longer with vomiting, lipase level is wnl    Anise Squibb ([phone]) and grand daughter Yani Bae ([phone]).  Are assisting with care, there is talk to have her move in with them    Provided's currently:  COPD/CHRIS on CPAP- pulm Dr. Juan Louis  Memory/ dizzinessNeurology- NP bibealt/ Cardiology- Dr. Elpidio Frazier  Depression/bipolar/ schizophrenia- CPA Janine long  Anemia of chronic disease- [facility] Dr. Ghazal Suárez, advised to see if dr. Richie Gates can take over, to limit specialists/better adherence, if needing epo  CKD- dr. Elisabet Lemons (upcoming)  Vertigo- ENT Dr. Josephine Little (Upcoming)  Cervical DDD- JOLENE Leanne Lowell 9/16      ROS:  See HPI, all others negative    ROS random episodes of chills, no wt loss/appetite changes, no hot flashes/night sweates/ fever    Patient Active Problem List   Diagnosis Code    Pure hypercholesterolemia E78.00    Overactive bladder N32.81    Chronic allergic rhinitis J30.9    Anemia R01.2    Uncomplicated asthma P68.164    Type 2 diabetes mellitus without complication, without long-term current use of insulin ([facility]) E11.9       Current Outpatient Prescriptions   Medication Sig Dispense Refill    pantoprazole (PROTONIX) 40 mg tablet Take 1 Tab by mouth daily. 90 Tab 0    metFORMIN ER (GLUCOPHAGE XR) 500 mg tablet Take 2 Tabs by mouth daily (with dinner). 180 Tab 0    montelukast (SINGULAIR) 10 mg tablet Take 1 Tab by mouth daily. 90 Tab 2    cyclobenzaprine (FLEXERIL) 5 mg tablet Take 5 mg by mouth three (3) times daily as needed for Muscle Spasm(s).  aspirin delayed-release 81 mg tablet Take  by mouth daily.  cyanocobalamin (VITAMIN B12) 500 mcg tablet Take 500 mcg by mouth daily.  fluticasone-vilanterol (BREO ELLIPTA) 200-25 mcg/dose inhaler Take 1 Puff by inhalation daily.  atorvastatin (LIPITOR) 10 mg tablet Take 1 Tab by mouth daily. 90 Tab 0    oxybutynin (DITROPAN) 5 mg tablet Take 1 Tab by mouth two (2) times a day. 60 Tab 0    SITagliptin (JANUVIA) 50 mg tablet 50 mg.      gabapentin (NEURONTIN) 100 mg capsule 100 mg.      fluticasone (VERAMYST) 27.5 mcg/actuation nasal spray [place]  in Nose.  cycloSPORINE (RESTASIS) 0.05 % ophthalmic emulsion Instill 1 Drop in affected eye(s) Every 12 hours.  HYDROcodone-acetaminophen (NORCO) 5-325 mg per tablet   0    ipratropium-albuterol (COMBIVENT RESPIMAT)  mcg/actuation inhaler Take 1 inhalation inhaled by mouth Take As Needed.       ferrous sulfate 325 mg (65 mg iron) tablet 325 mg.      mometasone-formoterol (DULERA) 100-5 mcg/actuation HFA inhaler Take 2 Puffs by inhalation two (2) times a day. Allergies   Allergen Reactions    Iodine Swelling    Nabumetone Swelling    Penicillins Unknown (comments)    Shellfish Containing Products Swelling    Sulfa (Sulfonamide Antibiotics) Swelling       Past Medical History:   Diagnosis Date    Allergic rhinitis     Anemia     Asthma     Candida vaginitis     CKD (chronic kidney disease) stage 3, GFR 30-59 ml/min     COPD (chronic obstructive pulmonary disease) (HCC)     Diabetes (HCC)     Diabetes (HCC)     GERD (gastroesophageal reflux disease)     h pylori gastritis egd 11/14 dr Mary Valderrama    Headache     Hx of colonoscopy 11/05/2014    normal rpt 10 years, 11/2024, dr. Mary Valderrama    Hypercholesterolemia     Mild dementia     OAB (overactive bladder)     CHRIS on CPAP     Radiculopathy     Urinary incontinence     UTI (urinary tract infection)        Social History     Social History    Marital status:      Spouse name: N/A    Number of children: N/A    Years of education: N/A     Occupational History    Not on file. Social History Main Topics    Smoking status: Former Smoker     Years: 20.00    Smokeless tobacco: Former User     Quit date: 1/1/1999    Alcohol use Yes    Drug use: No    Sexual activity: Not Currently     Partners: Male     Other Topics Concern    Not on file     Social History Narrative       History reviewed. No pertinent family history.       OBJECTIVE    Physical Exam:     Visit Vitals  /62 (BP 1 Location: Left arm, BP Patient Position: Sitting)   Pulse 62   Temp 97.5 °F (36.4 °C) (Oral)   Resp 16   Ht 5' 1\" (1.549 m)   Wt 146 lb 12.8 oz (66.6 kg)   SpO2 99%   BMI 27.74 kg/m²       General: alert, AA, in no apparent distress or pain  CVS: normal rate, regular rhythm, distinct S1 and S2  Lungs:clear to ausculation bilaterally, no crackles, wheezing or rhonchi noted  Extremities: No edema, no cyanosis  Psych:  mood and affect normal    ASSESSMENT/PLAN  Diagnoses and all orders for this visit:    Migraine without aura and without status migrainosus, not intractable  Switch zoloft to cymbalta, to hopefully address cervical arthritis and DM neuropathy as well    Schizophrenia, unspecified type ([facility] [address]  Advised to inform her psychiatrist CPA of these diagnosis and to have daughter/granddaughter join her during visits for improved communication/care    Bipolar affective disorder, remission status unspecified ([facility] [address]    Anemia of chronic disease  Stable  hgb baseline 10's  Consideration of aransep, following [facility] dr. Kellen Ling  Discussed to transfer this care with dr. Tadeo garcia    CKD 3  Avoid nsaids  Monitoring    Frequent falls  Family members now involved with care    Likely multifactorial: peripheral/DM neuropathy  Ongoing home PT  Her neuro/cards eval was essentially neg. Mild carotid stenosis, chronic cerebral ischemic changes  Advised consistent walker use, fall precautions discussed  upcoming ENT evaluate dizziness/vertigo  Upcoming appt with dr. Bryan Pruett for chronic cervical stenosis    Recurrent  depression  Fair control, see above  Following [facility] psychiatry    Bilateral carotid stenosis  Mild  Optimize med management  On statin, asa  Monitoring    Cerebral microvascular disease  Optimize metabolic syndrome mgt     Type 2 diabetes mellitus with neuropathy(HCC)  Controlled  a1c 6.3  Resume januvia, no true dx of pancreatitis  Check a1c, bmp prior to next visit     Pure hypercholesterolemia  On lipitor     Overactive bladder  Responding to ditropan, to cont    Chronic bronchitis, unspecified chronic bronchitis type ([facility] [address]  Controlled  On ICS, prn WILLARD/ipatropium  Following pulm dr. Hussein Tillman in 4 weeks, for headache    Patient understands plan of care. Patient has provided input and agrees with goals. Yes

## 2020-09-18 NOTE — TELEPHONE ENCOUNTER
This patient contacted office for the following prescriptions to be filled:    Medication requested :   Requested Prescriptions     Pending Prescriptions Disp Refills    pravastatin (PRAVACHOL) 20 mg tablet 90 Tab 0     Sig: Take 1 Tab by mouth nightly. PCP: sukhwinder   Pharmacy or Print: [facility]  Mail order or Local pharmacy [facility]     Scheduled appointment if not seen by current providers in office: [address] [place] [address] 6/16/2020 f/u 10/6/2020 pt almost out.

## 2020-09-21 RX ORDER — PRAVASTATIN SODIUM 20 MG/1
20 TABLET ORAL
Qty: 90 TAB | Refills: 0 | Status: SHIPPED | OUTPATIENT
Start: 2020-09-21 | End: 2020-11-30

## 2020-10-12 ENCOUNTER — VIRTUAL VISIT (OUTPATIENT)
Dept: FAMILY MEDICINE CLINIC | Age: 70
End: 2020-10-12
Payer: MEDICARE

## 2020-10-12 DIAGNOSIS — N18.31 ANEMIA DUE TO STAGE 3A CHRONIC KIDNEY DISEASE (HCC): ICD-10-CM

## 2020-10-12 DIAGNOSIS — K21.9 GASTROESOPHAGEAL REFLUX DISEASE, UNSPECIFIED WHETHER ESOPHAGITIS PRESENT: ICD-10-CM

## 2020-10-12 DIAGNOSIS — E78.00 PURE HYPERCHOLESTEROLEMIA: ICD-10-CM

## 2020-10-12 DIAGNOSIS — J44.9 CHRONIC OBSTRUCTIVE PULMONARY DISEASE, UNSPECIFIED COPD TYPE (HCC): ICD-10-CM

## 2020-10-12 DIAGNOSIS — F31.9 BIPOLAR AFFECTIVE DISORDER, REMISSION STATUS UNSPECIFIED (HCC): ICD-10-CM

## 2020-10-12 DIAGNOSIS — I67.89 CEREBRAL MICROVASCULAR DISEASE: ICD-10-CM

## 2020-10-12 DIAGNOSIS — F20.9 SCHIZOPHRENIA, UNSPECIFIED TYPE (HCC): ICD-10-CM

## 2020-10-12 DIAGNOSIS — D63.8 ANEMIA OF CHRONIC DISEASE: ICD-10-CM

## 2020-10-12 DIAGNOSIS — D63.1 ANEMIA DUE TO STAGE 3A CHRONIC KIDNEY DISEASE (HCC): ICD-10-CM

## 2020-10-12 DIAGNOSIS — N32.81 OVERACTIVE BLADDER: ICD-10-CM

## 2020-10-12 DIAGNOSIS — J30.9 CHRONIC ALLERGIC RHINITIS: ICD-10-CM

## 2020-10-12 DIAGNOSIS — I65.23 BILATERAL CAROTID ARTERY STENOSIS: ICD-10-CM

## 2020-10-12 DIAGNOSIS — N18.31 STAGE 3A CHRONIC KIDNEY DISEASE (HCC): ICD-10-CM

## 2020-10-12 DIAGNOSIS — J45.20 MILD INTERMITTENT ASTHMA WITHOUT COMPLICATION: ICD-10-CM

## 2020-10-12 DIAGNOSIS — E11.21 TYPE 2 DIABETES WITH NEPHROPATHY (HCC): Primary | ICD-10-CM

## 2020-10-12 PROBLEM — D57.3 SICKLE CELL TRAIT (HCC): Status: RESOLVED | Noted: 2019-05-13 | Resolved: 2020-10-12

## 2020-10-12 PROCEDURE — 1090F PRES/ABSN URINE INCON ASSESS: CPT | Performed by: FAMILY MEDICINE

## 2020-10-12 PROCEDURE — G9717 DOC PT DX DEP/BP F/U NT REQ: HCPCS | Performed by: FAMILY MEDICINE

## 2020-10-12 PROCEDURE — 3046F HEMOGLOBIN A1C LEVEL >9.0%: CPT | Performed by: FAMILY MEDICINE

## 2020-10-12 PROCEDURE — G8399 PT W/DXA RESULTS DOCUMENT: HCPCS | Performed by: FAMILY MEDICINE

## 2020-10-12 PROCEDURE — 99214 OFFICE O/P EST MOD 30 MIN: CPT | Performed by: FAMILY MEDICINE

## 2020-10-12 PROCEDURE — G8427 DOCREV CUR MEDS BY ELIG CLIN: HCPCS | Performed by: FAMILY MEDICINE

## 2020-10-12 PROCEDURE — 3288F FALL RISK ASSESSMENT DOCD: CPT | Performed by: FAMILY MEDICINE

## 2020-10-12 PROCEDURE — G9899 SCRN MAM PERF RSLTS DOC: HCPCS | Performed by: FAMILY MEDICINE

## 2020-10-12 PROCEDURE — 3017F COLORECTAL CA SCREEN DOC REV: CPT | Performed by: FAMILY MEDICINE

## 2020-10-12 PROCEDURE — 2022F DILAT RTA XM EVC RTNOPTHY: CPT | Performed by: FAMILY MEDICINE

## 2020-10-12 PROCEDURE — 1100F PTFALLS ASSESS-DOCD GE2>/YR: CPT | Performed by: FAMILY MEDICINE

## 2020-10-12 NOTE — PROGRESS NOTES
Luther Cain is a 65 y.o. female who was seen by synchronous (real-time) audio-video technology on 10/12/2020. Consent: Luther Cain, who was seen by synchronous (real-time) audio-video technology, and/or her healthcare decision maker, is aware that this patient-initiated, Telehealth encounter on 10/12/2020 is a billable service, with coverage as determined by her insurance carrier. She is aware that she may receive a bill and has provided verbal consent to proceed: Yes. 712  Subjective: Luther Cain is a 65 y.o. female who was seen for No chief complaint on file. DM w/ nephropathy and neuropathy-  a1c 6.9>12.6 (feb 2020). She is on renally dosed [place]. She reports -160's, she is back to following with endocrinologist she says nadine/amish dodson. She continues to receive meals on wheels food 3x a week and grocery shops with her niece once a week. She was previously recommended against insulin due to safety concern with administration. She has standing order for repeat labs and reminded patient to have this done to reevaluate kidney function and DM control. She had ED visit for epistaxis in July creatinine was 1.9 and glucose 116. HL- tolerating pravachol better than lipitor     OAB- responding to ditropan    COPD/asthma-doing well, no cough, sob, taking breo    GERD- on protonix    Chronic rhinitis- recent cautery for epistaxis dr. Johnny Carrillo. Schizophrenia/bipolar/insomnia- says talking to her counselor weekly. She is on wellbutrin/restoril  with fairly stable mood. Denies hallucinations, harmful ideation. Prior to Admission medications    Medication Sig Start Date End Date Taking? Authorizing Provider   pravastatin (PRAVACHOL) 20 mg tablet Take 1 Tab by mouth nightly. 9/21/20  Yes Alfredo Castro MD   oxybutynin (DITROPAN) 5 mg tablet Take 1 Tab by mouth two (2) times a day.  Take at 8:00 am and 10:00 pm  Indications: overactive bladder 7/28/20  Yes Kenyetta Del Rosario MD   saliva stimulant (Biotene Moisturizing Mouth) spry Take 1 Spray by mouth as needed for Other. Yes Provider, Historical   lidocaine 4 % patch 1 Patch by TransDERmal route every twenty-four (24) hours. Yes Provider, Historical   camphor-menthoL 11-11 % oint by Apply Externally route daily as needed. Yes Provider, Historical   montelukast (Singulair) 10 mg tablet Take 1 Tab by mouth daily. At 8:00 am  Indications: controller medication for asthma 6/2/20  Yes Kenyetta Del Rosario MD   pantoprazole (PROTONIX) 40 mg tablet Take 1 Tab by mouth daily. At 8:00 am  Indications: gastroesophageal reflux disease 6/2/20  Yes Kenyetta Del Rosario MD   Januvia 50 mg tablet Take 1 Tab by mouth daily. 6/2/20  Yes Kenyetta Del Rosario MD   buPROPion XL (WELLBUTRIN XL) 150 mg tablet Take 150 mg by mouth daily. 5/9/20  Yes Provider, Historical   cyanocobalamin, vitamin B-12, (VITAMIN B-12 PO) Take 2,500 mcg by mouth daily. Yes Provider, Historical   bisacodyL 5 mg tab Take 1 Tab by mouth daily as needed. Yes Provider, Historical   temazepam (RESTORIL) 15 mg capsule Take 1 Cap by mouth nightly. 3/27/20  Yes Provider, Historical   simethicone (GAS-X PO) Take 250 mg by mouth daily as needed. Yes Provider, Historical   acetaminophen (TYLENOL) 500 mg capsule Take 1,000 mg by mouth every six (6) hours as needed for Pain (back). Yes Provider, Historical   olopatadine (PAZEO) 0.7 % drop Apply 1 Drop to eye daily. both eyes  Indications: Allergic Conjunctivitis   Yes Provider, Historical   sodium chloride (OCEAN) 0.65 % nasal squeeze bottle 1 [place] by Both Nostrils route as needed for Congestion. Yes Provider, Historical   fluticasone-vilanterol (BREO ELLIPTA) 200-25 mcg/dose inhaler Take 1 Puff by inhalation daily.  Indications: Controller Medication for Asthma   Yes Provider, Historical   cycloSPORINE (RESTASIS) 0.05 % ophthalmic emulsion 1 Drop. 1 drop in each eye once a day   Yes Provider, Historical   ipratropium-albuterol (COMBIVENT RESPIMAT)  mcg/actuation inhaler Take 1 inhalation by mouth Take As Needed daily for asthma 8/28/19  Yes Provider, Historical   saliva substitution (Biotene Dry Mouth Oral Rinse) mwsh mouthwash 15 mL by Mucous Membrane route as needed. Provider, Historical     Allergies   Allergen Reactions    Aspirin Other (comments)    Flexeril [Cyclobenzaprine] Other (comments)     Patient states not allergic to this medication.  Iodine Swelling    Nabumetone Swelling    Penicillins Unknown (comments)     Other reaction(s): Other (see comments)    Shellfish Containing Products Swelling    Sulfa (Sulfonamide Antibiotics) Swelling    Tramadol Itching       Patient Active Problem List    Diagnosis Date Noted    Chronic obstructive pulmonary disease ([facility] 06/04/2020    Type [facility]ropathy [facility] 02/27/2020    Gastroesophageal reflux disease 11/29/2019    Bipolar affective disorder ([facility] 08/19/2019    Schizophrenia ([facility] 08/19/2019    CKD (chronic kidney disease) stage 3, GFR 30-59 ml/min 08/15/2019    Cerebral microvascular disease 05/13/2019    Gait disturbance 05/13/2019    Frequent falls 05/13/2019    Bilateral carotid artery stenosis 05/13/2019    Type 2 diabetes mellitus with diabetic polyneuropathy, without long-term current use of insulin ([facility] 05/13/2019    Recurrent depression ([facility] 01/03/2019    Anemia of chronic disease 08/20/2018    Type 2 diabetes mellitus without complication, without long-term current use of insulin ([facility] 02/15/2018    Pure hypercholesterolemia 01/15/2018    Overactive bladder 01/15/2018    Chronic allergic rhinitis 01/15/2018    Anemia 61/48/1904    Uncomplicated asthma 29/64/7491     Current Outpatient Medications   Medication Sig Dispense Refill    pravastatin (PRAVACHOL) 20 mg tablet Take 1 Tab by mouth nightly. 90 Tab 0    oxybutynin (DITROPAN) 5 mg tablet Take 1 Tab by mouth two (2) times a day.  Take at 8:00 am and 10:00 pm  Indications: overactive bladder 180 Tab 3    saliva stimulant (Biotene Moisturizing Mouth) spry Take 1 Spray by mouth as needed for Other.  lidocaine 4 % patch 1 Patch by TransDERmal route every twenty-four (24) hours.  camphor-menthoL 11-11 % oint by Apply Externally route daily as needed.  montelukast (Singulair) 10 mg tablet Take 1 Tab by mouth daily. At 8:00 am  Indications: controller medication for asthma 90 Tab 3    pantoprazole (PROTONIX) 40 mg tablet Take 1 Tab by mouth daily. At 8:00 am  Indications: gastroesophageal reflux disease 90 Tab 3    Januvia 50 mg tablet Take 1 Tab by mouth daily. 90 Tab 3    buPROPion XL (WELLBUTRIN XL) 150 mg tablet Take 150 mg by mouth daily.  cyanocobalamin, vitamin B-12, (VITAMIN B-12 PO) Take 2,500 mcg by mouth daily.  bisacodyL 5 mg tab Take 1 Tab by mouth daily as needed.  temazepam (RESTORIL) 15 mg capsule Take 1 Cap by mouth nightly.  simethicone (GAS-X PO) Take 250 mg by mouth daily as needed.  acetaminophen (TYLENOL) 500 mg capsule Take 1,000 mg by mouth every six (6) hours as needed for Pain (back).  olopatadine (PAZEO) 0.7 % drop Apply 1 Drop to eye daily. both eyes  Indications: Allergic Conjunctivitis      sodium chloride (OCEAN) 0.65 % nasal squeeze bottle 1 [place] by Both Nostrils route as needed for Congestion.  fluticasone-vilanterol (BREO ELLIPTA) 200-25 mcg/dose inhaler Take 1 Puff by inhalation daily. Indications: Controller Medication for Asthma      cycloSPORINE (RESTASIS) 0.05 % ophthalmic emulsion 1 Drop. 1 drop in each eye once a day      ipratropium-albuterol (COMBIVENT RESPIMAT)  mcg/actuation inhaler Take 1 inhalation by mouth Take As Needed daily for asthma      saliva substitution (Biotene Dry Mouth Oral Rinse) mwsh mouthwash 15 mL by Mucous Membrane route as needed.        Allergies   Allergen Reactions    Aspirin Other (comments)    Flexeril [Cyclobenzaprine] Other (comments)     Patient states not allergic to this medication.  Iodine Swelling    Nabumetone Swelling    Penicillins Unknown (comments)     Other reaction(s): Other (see comments)    Shellfish Containing Products Swelling    Sulfa (Sulfonamide Antibiotics) Swelling    Tramadol Itching     Past Medical History:   Diagnosis Date    Allergic rhinitis     Anemia     Asthma     Candida vaginitis     CKD (chronic kidney disease) stage 3, GFR 30-59 ml/min ([facility]     COPD (chronic obstructive pulmonary disease) (HCC)     Diabetes [facility]     Diabetes ([facility]     GERD (gastroesophageal reflux disease)     h pylori gastritis egd 11/14 dr Faviola Koo    Headache     Hx of colonoscopy 11/05/2014    normal rpt 10 years, 11/2024, dr. Faviola Koo    Hypercholesterolemia     Mild dementia ([facility]     OAB (overactive bladder)     CHRIS on CPAP     Radiculopathy     Urinary incontinence     UTI (urinary tract infection)      Past Surgical History:   Procedure Laterality Date    HX COLONOSCOPY  11/05/2014    Normal/Repeat in 10 years/Dr. Madison Chan    HX HYSTERECTOMY  1970    HX ORTHOPAEDIC Right 02/2012    knee replacement      No family history on file. Social History     Tobacco Use    Smoking status: Former Smoker     Years: 20.00    Smokeless tobacco: Former User     Quit date: 1/1/1999   Substance Use Topics    Alcohol use: Yes       ROS      Objective: There were no vitals taken for this visit. General: alert, cooperative, no distress   Mental  status: normal mood, behavior, speech, dress, motor activity, and thought processes, able to follow commands   HENT: NCAT   Neck: no visualized mass   Resp: no respiratory distress   Neuro: no gross deficits   Skin: no discoloration or lesions of concern on visible areas   Psychiatric: normal affect, consistent with stated mood, no evidence of hallucinations     Additional exam findings:        We discussed the expected course, resolution and complications of the diagnosis(es) in detail. Medication risks, benefits, costs, interactions, and alternatives were discussed as indicated. I advised her to contact the office if her condition worsens, changes or fails to improve as anticipated. She expressed understanding with the diagnosis(es) and plan. John Smith is a 65 y.o. female who was evaluated by a video visit encounter for concerns as above. Patient identification was verified prior to start of the visit. A caregiver was present when appropriate. Due to this being a TeleHealth encounter (During XJA-61 public health emergency), evaluation of the following organ systems was limited: Vitals/Constitutional/EENT/Resp/CV/GI//MS/Neuro/Skin/Heme-Lymph-Imm. Pursuant to the emergency declaration under the [facility], 1135 waiver authority and the [employer] Act, this Virtual  Visit was conducted, with patient's (and/or legal guardian's) consent, to reduce the patient's risk of exposure to COVID-19 and provide necessary medical care. Services were provided through a video synchronous discussion virtually to substitute for in-person clinic visit. Patient and provider were located at their individual homes. Assessment & Plan:   Diagnoses and all orders for this visit:    Type 2 diabetes with nephropathy ([facility]  a1c 6.3>6.9>12.9  Improving FBG #'s  Advised to recheck labs soon, we have provided transportation resources  On januvia  Following endo, considering once a week insulin with her daughter  Complex case- challenging social situation as she is currently living independently, and needs medication supervision.  Her daughter checks up on her  She is in contact with /[facility]/ meals on wheels/ pill pack  Urged her to move in with daughter/plan for future residency   She has transportation through her insurance  following [facility] 3/18/2020  Will refer to Pharm D  Cbc/cmp/a1c/lipid panel/microalbukmin    Anemia of chronic disease  hgb 9-10's  Check cbc, iron profile, ferritin    Bipolar affective disorder, remission status unspecified ([facility]  Following psychiatry  On wellbutrin, restoril     Schizophrenia, unspecified type ([facility]    CKD (chronic kidney disease) stage 3, GFR 30-59 ml/min ([facility]  Creatinine 1.9 (July)  Advised to recheck GFR, has standing lab orders  Discussed her Lety Warren is renally dosed and importance of DM control    Gastroesophageal reflux disease, esophagitis presence not specified  On protonix    CVA, old, homonymous hemianopsia  Switch lipitor to pravachol, see above   LDL goal <70     Bilateral carotid artery stenosis     Chronic obstructive pulmonary disease, unspecified COPD type ([facility]  Controlled  On ICS, prn WILLARD/ipatropium  Following pulm dr. Panfilo Celestin    Chronic allergic rhinitis  Following dr. Alka Sullivan bladder    Responding to ditropan, to cont    Ff-up in 12 weeks or sooner prn    Sky Constantino MD

## 2020-10-16 ENCOUNTER — TELEPHONE (OUTPATIENT)
Dept: FAMILY MEDICINE CLINIC | Age: 70
End: 2020-10-16

## 2020-10-16 NOTE — TELEPHONE ENCOUNTER
Pt states that she is having troubles with her bowels and gas. She states that she has been taking women's laxatives and now they are not working and she is staying constipated. she hasn't had a bowel movement for a bout 2 days. Would like to know if something can be called in. Please advise.

## 2020-10-19 RX ORDER — POLYETHYLENE GLYCOL 3350 17 G/17G
17 POWDER, FOR SOLUTION ORAL DAILY
Qty: 30 EACH | Refills: 0 | Status: SHIPPED | OUTPATIENT
Start: 2020-10-19 | End: 2021-01-22 | Stop reason: ALTCHOICE

## 2020-10-19 NOTE — TELEPHONE ENCOUNTER
Left detailed message that Dr. Suhas Hughes has E scribed Miralax  To the pharmacy. If no improvement in 2 weeks. Patient will need to schedule follow up.

## 2020-10-22 ENCOUNTER — PATIENT OUTREACH (OUTPATIENT)
Dept: CASE MANAGEMENT | Age: 70
End: 2020-10-22

## 2020-10-22 NOTE — PROGRESS NOTES
Social Work Note  10/22/2020      Date of referral: 2020  Referral received from: [facility] - Marci Nicole RN  Reason for referral: To assist with resources needed     Previous  Referral:  no   If yes, brief summary and outcome:  no     Support System: friends, brother     Community Providers: PCP, Endocrinologist, [address] [place] [address]     Transportation: Nandini Tolbert, granddaughter     Medication:n/a     Financial Concern: Limited income     Advance Care Plan:  not on file; education provided      Other: n/a     Identified Needs:      · Obtaining meals (proper nutrition)  · Lack of family support  · Unsafe home condition         Social Work Plan:     · Assist with scheduling \"Meals on [employer]" (Contacting for updates)  · Patient's consent to communicate with her daughter Mervat Sterling for involvement. · Seek apartment availability in [facility] through the [place] and housing authority (various [facility]). MSW received the referral from [facility] to assist the patient with needed community resources. MSW contacted the patient who verified her name and  as identifiers. MSW introduced self, role and reason for call. The patient expressed how happy she was to hear from MSW. She reported that she is in a good place right now and explained that this is in the sense that everything seems to be falling into place for her. The patient shared that she continues to receive meals from [facility]'' and she has a friend who would take her grocery shopping when needed. She mentioned that the landlord have replaced her stove, fixed the window and hole in the wall. She said that she is okay living in the house for now but knows that she wants to move in the future. The patient informed MSW that relationship with her daughter and grand-daughter is not working out. She shared that she's been praying about it and indicated that she is in a better place not having to communicate with them.  She mentioned that she wakes up with a smile every morning. She shared she has been doing phone visits  with her psychiatrist and has been compliant with taking her medications. MSW utilized skills of attentive listening and provided the patient with information to [address] [place] [address]. MSW will follow up to assist the patient as needed.

## 2020-10-28 ENCOUNTER — PATIENT OUTREACH (OUTPATIENT)
Dept: CASE MANAGEMENT | Age: 70
End: 2020-10-28

## 2020-10-28 NOTE — PROGRESS NOTES
Social Work Note  10/28/2020    Date of referral: 2/24/2020  Referral received from: SILKE Nicole RN  Reason for referral: To assist with resources needed     Previous SW Referral:  no   If yes, brief summary and outcome:  no     Support System: friends, brother     Community Providers: PCP, Endocrinologist, [address] [place] [address]     Transportation: IRide, granddaughter     Medication:n/a     Financial Concern: Limited income     Advance Care Plan:  not on file; education provided      Other: n/a     Identified Needs:      · Obtaining meals (proper nutrition)  · Lack of family support  · Unsafe home condition         Social Work Plan:     · Assist with scheduling \"Meals on [employer]" (Contacting for updates)  · Patient's consent to communicate with her daughter Gianluca Wilder for involvement. · Seek apartment availability in [facility] through the [employer] and housing authority (various cities).     MSW received the referral from [facility] to assist the patient with needed community resources. MSW attempted to reach the patient by telephone. Left HIPPA compliant message requesting a return call. MSW will re-attempt to reach the patient at a later time.

## 2020-10-30 ENCOUNTER — TELEPHONE (OUTPATIENT)
Dept: FAMILY MEDICINE CLINIC | Age: 70
End: 2020-10-30

## 2020-10-30 NOTE — TELEPHONE ENCOUNTER
Pt states that she started to get the runs while on the Miralax and was having to wear depends and said that she was going to stop taking it till she hears from the nurse. Advised that the the nurse and Dr Herb Thomas were out of the office and that it would be ok for her not to take it unless she felt constipated again. Pt voiced understanding. Please advise.

## 2020-11-02 NOTE — TELEPHONE ENCOUNTER
Contacted patient and verified identity using name and date of birth (2- identifiers)  Spoke with patient and she reports no more issues with diarrhea. I advised patient that she might just want to take the Miralax as needed versus everyday. I also said that I would send the message to Dr. Judie Gates for any additional recommendations. She verbalized understanding.

## 2020-11-06 ENCOUNTER — PATIENT OUTREACH (OUTPATIENT)
Dept: CASE MANAGEMENT | Age: 70
End: 2020-11-06

## 2020-11-06 NOTE — PROGRESS NOTES
Social Work Note  2020  Date of referral: 2020  Referral received from: [facility] - Aaliyah Due - RN  Reason for referral: To assist with resources needed     Previous  Referral:  no   If yes, brief summary and outcome:  no     Support System: friends, brother     Community Providers: PCP, Endocrinologist, [address] [place] [address]     Transportation: IRide, granddaughter     Medication:n/a     Financial Concern: Limited income     Advance Care Plan:  not on file; education provided      Other: n/a     Identified Needs:      · Patient considering to move in the future. · Patient needing [address] Work Plan:     · Research and provide the patient with community resources for housing opportunities. · Assist patient with contacting  to request a UAI in order to initiate process of Medicaid application.     MSW received the referral from [facility] to assist the patient with needed community resources. MSW contacted the patient who verified her name and  as identifiers. MSW introduced self, role and reason for call. The patient is alert and oriented and engaging in conversation. The patient shared that she contacted the [employer] regarding opportunity for housing and left a message. She mentioned that a representative returned her call but she was not available at the time. Ms Yanelis Ibarra stated that she had Medicaid coverage in the past and inquired on how she may be able to regain her coverage. MSW will assist the patient with communicating with the Dept of  to schedule appointment for assessment (UAI) to be completed. Patient agrees to plan. MSW will follow to assist the patient as needed.

## 2020-11-10 ENCOUNTER — PATIENT OUTREACH (OUTPATIENT)
Dept: CASE MANAGEMENT | Age: 70
End: 2020-11-10

## 2020-11-10 NOTE — PROGRESS NOTES
Social Work Note  11/10/2020      Date of referral: 2020  Referral received from: SILKE Nicole RN  Reason for referral: To assist with resources needed     Previous  Referral:  no   If yes, brief summary and outcome:  no     Support System: friends, brother     Community Providers: PCP, Endocrinologist, [address] [place] [address]     Transportation: IRide, granddaughter     Medication:n/a     Financial Concern: Limited income     Advance Care Plan:  not on file; education provided      Other: n/a     Identified Needs:      · Patient considering to move in the future. · Patient needing [address] Work Plan:     · Research and provide the patient with community resources for housing opportunities. · Assist patient with contacting  to request a UAI in order to initiate process of Medicaid application.     MSW received the referral from [facility] to assist the patient with needed community resources.      MSW attempted to contact assistance at the [address]. Message left requesting a return call. TOMER received call from Ms Kei Ramires with the [address] [facility] who could not give information on the patient's past applications for Medicaid. She provided MSW with contact number for the patient to call ([phone]) to request Medicaid application and screening. MSW contacted the patient who verified her name and  as identifiers. MSW introduced self, role and reason for call. The patient is alert and oriented and engaging in conversation. MSW provided the patient with contact number. The patient stated that she will call \"to see what they're talking about. \"    MSW will continue to follow to assist as needed.

## 2020-11-20 ENCOUNTER — PATIENT OUTREACH (OUTPATIENT)
Dept: CASE MANAGEMENT | Age: 70
End: 2020-11-20

## 2020-11-20 NOTE — PROGRESS NOTES
Social Work Note  2020  Date of referral: 2020  Referral received from: TANYA Nicole RN  Reason for referral: To assist with resources needed     Previous  Referral:  no   If yes, brief summary and outcome:  no     Support System: friends, brother     Community Providers: PCP, Endocrinologist, [address] [place] [address]     Transportation: IRide, granddaughter     Medication:n/a     Financial Concern: Limited income     Advance Care Plan:  not on file; education provided      Other: n/a     Identified Needs:      · Patient considering to move in the future. · Patient needing [address] Work Plan:     · Research and provide the patient with community resources for housing opportunities. · Assist patient with contacting  to request a UAI in order to initiate process of Medicaid application.     MSW received the referral from [facility] to assist the patient with needed community resources.      MSW contacted the patient who verified her name and  as identifiers. MSW introduced self, role and reason for call. The patient is alert and oriented and engaging in conversation. The patient was in good spirits and talkative. She reported that she re-enrolled into the same insurance coverage she's had. The patient mentioned that she believes that the [address] attempted to reach her but she missed the call. She shared that she will give them a call back. The patient had no concerns at this time. MSW will continue to follow to assist the patient as needed.

## 2020-11-30 RX ORDER — PRAVASTATIN SODIUM 20 MG/1
TABLET ORAL
Qty: 90 TAB | Refills: 0 | Status: SHIPPED | OUTPATIENT
Start: 2020-11-30 | End: 2021-01-22 | Stop reason: DRUGHIGH

## 2020-12-03 ENCOUNTER — PATIENT OUTREACH (OUTPATIENT)
Dept: CASE MANAGEMENT | Age: 70
End: 2020-12-03

## 2020-12-03 NOTE — PROGRESS NOTES
Social Work Note  12/3/2020      Date of referral: 2020  Referral received from: [facility] Bonnie Pederson Six - RN  Reason for referral: To assist with resources needed     Previous  Referral:  no   If yes, brief summary and outcome:  no     Support System: friends, brother     Community Providers: PCP, Endocrinologist, [facility]     Transportation: IRide, granddaughter     Medication:n/a     Financial Concern: Limited income     Advance Care Plan:  not on file; education provided      Other: n/a     Identified Needs:      · Patient considering to move in the future. · Patient needing [address] Work Plan:     · Research and provide the patient with community resources for housing opportunities. · Assist patient with contacting  to request a UAI in order to initiate process of Medicaid application.     MSW received the referral from [facility] to assist the patient with needed community resources.      MSW contacted the patient who verified her name and  as identifiers. MSW introduced self, role and reason for call. The patient is alert and oriented and engaging in conversation.   The patient shared that she had a good  day and her granddaughter bought her groceries. She mentioned that she has not contacted the [facility] in the past week. She plans on contacted the department in the upcoming week. The patient shared no other issues or concerns at this time. MSW will follow to assist as needed.

## 2020-12-07 LAB
CREATININE, EXTERNAL: 2
HBA1C MFR BLD HPLC: 8.1 %
LDL-C, EXTERNAL: 114
MICROALBUMIN UR TEST STR-MCNC: 3.9 MG/DL

## 2020-12-22 ENCOUNTER — PATIENT OUTREACH (OUTPATIENT)
Dept: CASE MANAGEMENT | Age: 70
End: 2020-12-22

## 2020-12-22 NOTE — PROGRESS NOTES
Social Work Note  2020    Date of referral: 2020  Referral received from: [facility] - Dakotah Nicole RN  Reason for referral: To assist with resources needed     Previous  Referral:  no   If yes, brief summary and outcome:  no     Support System: friends, brother     Community Providers: PCP, Endocrinologist, [address] [place] [address]     Transportation: IRide, granddaughter     Medication:n/a     Financial Concern: Limited income     Advance Care Plan:  not on file; education provided      Other: n/a     Identified Needs:      · Patient considering to move in the future. · Patient needing [address] Work Plan:     · Research and provide the patient with community resources for housing opportunities. · Assist patient with contacting  to request a UAI in order to initiate process of Medicaid application.     MSW received the referral from [facility] to assist the patient with needed community resources.      MSW contacted the patient who verified her name and  as identifiers. MSW introduced self, role and reason for call. The patient is alert and oriented and engaging in conversation. Patient informed MSW that she is currently in the doctor's office. MSW will call back at a later time.

## 2021-01-06 RX ORDER — SITAGLIPTIN 50 MG/1
50 TABLET, FILM COATED ORAL DAILY
Qty: 90 TAB | Refills: 3 | Status: CANCELLED | OUTPATIENT
Start: 2021-01-06

## 2021-01-06 RX ORDER — PANTOPRAZOLE SODIUM 40 MG/1
40 TABLET, DELAYED RELEASE ORAL DAILY
Qty: 90 TAB | Refills: 3 | Status: CANCELLED | OUTPATIENT
Start: 2021-01-06

## 2021-01-08 ENCOUNTER — PATIENT OUTREACH (OUTPATIENT)
Dept: CASE MANAGEMENT | Age: 71
End: 2021-01-08

## 2021-01-08 RX ORDER — SITAGLIPTIN 50 MG/1
50 TABLET, FILM COATED ORAL DAILY
Qty: 90 TAB | Refills: 0 | Status: ON HOLD | OUTPATIENT
Start: 2021-01-08

## 2021-01-08 NOTE — TELEPHONE ENCOUNTER
Pt would like a 8 day supply sent to [address] [place] [address] while she waits for her rx order to come from [facility]. This patient contacted office for the following prescriptions to be filled:    Last office visit: 10/12/2020  Follow up appointment: 1/22/2021  Medication requested :   Requested Prescriptions     Pending Prescriptions Disp Refills    Januvia 50 mg tablet 90 Tab 3     Sig: Take 1 Tab by mouth daily.      PCP: Ubaldo Lopes  Mail order or Local pharmacy name [facility] [address] [phone]

## 2021-01-08 NOTE — PROGRESS NOTES
Social Work Note  2021    Date of referral: 2020  Referral received from: SILKE Nicole RN  Reason for referral: To assist with resources needed     Previous  Referral:  no   If yes, brief summary and outcome:  no     Support System: friends, brother     Community Providers: PCP, Endocrinologist, [address] [place] [address]     Transportation: IRide, granddaughter     Medication:n/a     Financial Concern: Limited income     Advance Care Plan:  not on file; education provided      Other: n/a     Identified Needs:      · Patient considering to move in the future. · Patient needing [address] Work Plan:     · Research and provide the patient with community resources for housing opportunities. · Assist patient with contacting  to request a UAI in order to initiate process of Medicaid application.     MSW received the referral from [facility] to assist the patient with needed community resources.     MSW contacted the patient who is alert and oriented and engaged in conversation. MSW introduced self, role and reason for call. The patient verified her name and  as identifiers. The patient stated that she is doing good. She mentioned that she contacted her PCP's office for some medications refill until she receives her medications from mail order through her insurance. The patient shared that she had no other issues or concerns at this time. MSW will continue to follow to assist the patient as needed.

## 2021-01-22 ENCOUNTER — OFFICE VISIT (OUTPATIENT)
Dept: FAMILY MEDICINE CLINIC | Age: 71
End: 2021-01-22
Payer: MEDICARE

## 2021-01-22 VITALS
DIASTOLIC BLOOD PRESSURE: 66 MMHG | HEART RATE: 77 BPM | RESPIRATION RATE: 16 BRPM | TEMPERATURE: 99.2 F | HEIGHT: 61 IN | SYSTOLIC BLOOD PRESSURE: 130 MMHG | WEIGHT: 175.4 LBS | BODY MASS INDEX: 33.12 KG/M2 | OXYGEN SATURATION: 99 %

## 2021-01-22 DIAGNOSIS — N18.30 TYPE 2 DIABETES MELLITUS WITH STAGE 3 CHRONIC KIDNEY DISEASE, WITHOUT LONG-TERM CURRENT USE OF INSULIN, UNSPECIFIED WHETHER STAGE 3A OR 3B CKD (HCC): Primary | ICD-10-CM

## 2021-01-22 DIAGNOSIS — K21.9 GASTROESOPHAGEAL REFLUX DISEASE, UNSPECIFIED WHETHER ESOPHAGITIS PRESENT: ICD-10-CM

## 2021-01-22 DIAGNOSIS — E78.00 PURE HYPERCHOLESTEROLEMIA: ICD-10-CM

## 2021-01-22 DIAGNOSIS — N18.30 ANEMIA DUE TO STAGE 3 CHRONIC KIDNEY DISEASE, UNSPECIFIED WHETHER STAGE 3A OR 3B CKD (HCC): ICD-10-CM

## 2021-01-22 DIAGNOSIS — E11.22 TYPE 2 DIABETES MELLITUS WITH STAGE 3 CHRONIC KIDNEY DISEASE, WITHOUT LONG-TERM CURRENT USE OF INSULIN, UNSPECIFIED WHETHER STAGE 3A OR 3B CKD (HCC): Primary | ICD-10-CM

## 2021-01-22 DIAGNOSIS — J44.9 CHRONIC OBSTRUCTIVE PULMONARY DISEASE, UNSPECIFIED COPD TYPE (HCC): ICD-10-CM

## 2021-01-22 DIAGNOSIS — J30.9 CHRONIC ALLERGIC RHINITIS: ICD-10-CM

## 2021-01-22 DIAGNOSIS — N18.31 STAGE 3A CHRONIC KIDNEY DISEASE (HCC): ICD-10-CM

## 2021-01-22 DIAGNOSIS — F20.9 SCHIZOPHRENIA, UNSPECIFIED TYPE (HCC): ICD-10-CM

## 2021-01-22 DIAGNOSIS — N25.81 SECONDARY RENAL HYPERPARATHYROIDISM (HCC): ICD-10-CM

## 2021-01-22 DIAGNOSIS — I65.23 BILATERAL CAROTID ARTERY STENOSIS: ICD-10-CM

## 2021-01-22 DIAGNOSIS — E11.21 TYPE 2 DIABETES WITH NEPHROPATHY (HCC): ICD-10-CM

## 2021-01-22 DIAGNOSIS — D63.1 ANEMIA DUE TO STAGE 3 CHRONIC KIDNEY DISEASE, UNSPECIFIED WHETHER STAGE 3A OR 3B CKD (HCC): ICD-10-CM

## 2021-01-22 DIAGNOSIS — F31.9 BIPOLAR AFFECTIVE DISORDER, REMISSION STATUS UNSPECIFIED (HCC): ICD-10-CM

## 2021-01-22 PROCEDURE — 3017F COLORECTAL CA SCREEN DOC REV: CPT | Performed by: FAMILY MEDICINE

## 2021-01-22 PROCEDURE — 1090F PRES/ABSN URINE INCON ASSESS: CPT | Performed by: FAMILY MEDICINE

## 2021-01-22 PROCEDURE — 1100F PTFALLS ASSESS-DOCD GE2>/YR: CPT | Performed by: FAMILY MEDICINE

## 2021-01-22 PROCEDURE — 3288F FALL RISK ASSESSMENT DOCD: CPT | Performed by: FAMILY MEDICINE

## 2021-01-22 PROCEDURE — 3046F HEMOGLOBIN A1C LEVEL >9.0%: CPT | Performed by: FAMILY MEDICINE

## 2021-01-22 PROCEDURE — G9899 SCRN MAM PERF RSLTS DOC: HCPCS | Performed by: FAMILY MEDICINE

## 2021-01-22 PROCEDURE — G8417 CALC BMI ABV UP PARAM F/U: HCPCS | Performed by: FAMILY MEDICINE

## 2021-01-22 PROCEDURE — G8536 NO DOC ELDER MAL SCRN: HCPCS | Performed by: FAMILY MEDICINE

## 2021-01-22 PROCEDURE — G8399 PT W/DXA RESULTS DOCUMENT: HCPCS | Performed by: FAMILY MEDICINE

## 2021-01-22 PROCEDURE — G0463 HOSPITAL OUTPT CLINIC VISIT: HCPCS | Performed by: FAMILY MEDICINE

## 2021-01-22 PROCEDURE — 2022F DILAT RTA XM EVC RTNOPTHY: CPT | Performed by: FAMILY MEDICINE

## 2021-01-22 PROCEDURE — G9717 DOC PT DX DEP/BP F/U NT REQ: HCPCS | Performed by: FAMILY MEDICINE

## 2021-01-22 PROCEDURE — G8427 DOCREV CUR MEDS BY ELIG CLIN: HCPCS | Performed by: FAMILY MEDICINE

## 2021-01-22 PROCEDURE — 99214 OFFICE O/P EST MOD 30 MIN: CPT | Performed by: FAMILY MEDICINE

## 2021-01-22 RX ORDER — AMMONIUM LACTATE 12 G/100G
CREAM TOPICAL
Status: ON HOLD | COMMUNITY
Start: 2020-12-22

## 2021-01-22 RX ORDER — CETIRIZINE HCL 10 MG
10 TABLET ORAL
Qty: 90 TAB | Refills: 0 | Status: ON HOLD | OUTPATIENT
Start: 2021-01-22

## 2021-01-22 RX ORDER — PRAVASTATIN SODIUM 40 MG/1
40 TABLET ORAL
Qty: 90 TAB | Refills: 0 | Status: SHIPPED | OUTPATIENT
Start: 2021-01-22 | End: 2021-05-07

## 2021-01-22 NOTE — PROGRESS NOTES
1. Have you been to the ER, urgent care clinic since your last visit? Hospitalized since your last visit? 2. Have you seen or consulted any other health care providers outside of the [address] [place] [address] since your last visit? Include any pap smears or colon screening. Dr. Giovani Seay LOV: 2-3 months. Dr. Brittny Fontenot: 1-2 weeks ago.     Chief Complaint   Patient presents with    Anemia    Diabetes    Chronic Kidney Disease    GERD    Overactive Bladder    Other     schizophrenia and bipolar affective disorder

## 2021-01-22 NOTE — PATIENT INSTRUCTIONS

## 2021-01-23 PROBLEM — N25.81 SECONDARY RENAL HYPERPARATHYROIDISM (HCC): Status: ACTIVE | Noted: 2021-01-23

## 2021-01-23 NOTE — PROGRESS NOTES
Ana Lemos, 65 y.o.,  female    SUBJECTIVE  Ff-up    DM w/ nephropathy and neuropathy-  a1c 6.9>12.6 >8.1  She is on renally dosed januvia. She reports -160's, continues to follow with  endocrinologis amish dodsno. She continues to receive meals on wheels food 3x a week and grocery shops with her niece once a week. She was previously recommended against insulin due to safety concern with administration. CKD3-4-/anemia- following dr. Ruthanne Bumpers has upcoming appt. Current creatinine 2, GFR  26    HL- tolerating pravachol better than lipitor, reviewed labs      OAB- responding to ditropan    COPD/asthma-doing well, no cough, sob, taking breo, following pulmonologist    GERD- on protonix    Chronic rhinitis-persistent symptoms, Does not take anything for this, she has h/o epistaxis s/p cuatery    Schizophrenia/bipolar/insomnia- says talking to her counselor weekly. She is on wellbutrin with fairly stable mood. Says restoril was discontinued. Denies hallucinations, harmful ideation.          ROS:  See HPI, all others negative        Patient Active Problem List   Diagnosis Code    Pure hypercholesterolemia E78.00    Overactive bladder N32.81    Chronic allergic rhinitis J30.9    Anemia O49.0    Uncomplicated asthma H71.131    Type 2 diabetes mellitus without complication, without long-term current use of insulin ([facility] E11.9    Anemia of chronic disease D63.8    Recurrent depression ([facility] F33.9    Cerebral microvascular disease I67.89    Gait disturbance R26.9    Frequent falls R29.6    Bilateral carotid artery stenosis I65.23    Type 2 diabetes mellitus with diabetic polyneuropathy, without long-term current use of insulin (HCC) E11.42    CKD (chronic kidney disease) stage 3, GFR 30-59 ml/min N18.30    Bipolar affective disorder ([facility]) F31.9    Schizophrenia ([facility]) F20.9    Gastroesophageal reflux disease K21.9    Type 2 diabetes with nephropathy ([facility]) E11.21    Chronic obstructive pulmonary disease ([facility]) J44.9       Current Outpatient Medications   Medication Sig Dispense Refill    psyllium husk (METAMUCIL PO) Take  by mouth.  ammonium lactate (AMLACTIN) 12 % topical cream USE 1 APPLICATION TWICE DAILY TO DRY SKIN      bacitracin (BACITRAYCIN PLUS EX) by Apply Externally route.  pravastatin (PRAVACHOL) 40 mg tablet Take 1 Tab by mouth nightly. 90 Tab 0    cetirizine (ZYRTEC) 10 mg tablet Take 1 Tab by mouth daily as needed for Allergies. 90 Tab 0    Januvia 50 mg tablet Take 1 Tab by mouth daily. 90 Tab 0    oxybutynin (DITROPAN) 5 mg tablet Take 1 Tab by mouth two (2) times a day. Take at 8:00 am and 10:00 pm  Indications: overactive bladder 180 Tab 3    saliva stimulant (Biotene Moisturizing Mouth) spry Take 1 Spray by mouth as needed for Other.  saliva substitution (Biotene Dry Mouth Oral Rinse) mwsh mouthwash 15 mL by Mucous Membrane route as needed.  camphor-menthoL 11-11 % oint by Apply Externally route daily as needed.  montelukast (Singulair) 10 mg tablet Take 1 Tab by mouth daily. At 8:00 am  Indications: controller medication for asthma 90 Tab 3    pantoprazole (PROTONIX) 40 mg tablet Take 1 Tab by mouth daily. At 8:00 am  Indications: gastroesophageal reflux disease 90 Tab 3    buPROPion XL (WELLBUTRIN XL) 150 mg tablet Take 175 mg by mouth daily.  cyanocobalamin, vitamin B-12, (VITAMIN B-12 PO) Take 2,500 mcg by mouth daily.  simethicone (GAS-X PO) Take 250 mg by mouth daily as needed.  acetaminophen (TYLENOL) 500 mg capsule Take 1,000 mg by mouth every six (6) hours as needed for Pain (back).  olopatadine (PAZEO) 0.7 % drop Apply 1 Drop to eye daily. both eyes  Indications: Allergic Conjunctivitis      fluticasone-vilanterol (BREO ELLIPTA) 200-25 mcg/dose inhaler Take 1 Puff by inhalation daily.  Indications: Controller Medication for Asthma      ipratropium-albuterol (COMBIVENT RESPIMAT)  mcg/actuation inhaler Take 1 inhalation by mouth Take As Needed daily for asthma      cycloSPORINE (RESTASIS) 0.05 % ophthalmic emulsion 1 Drop. 1 drop in each eye once a day         Allergies   Allergen Reactions    Aspirin Other (comments)    Flexeril [Cyclobenzaprine] Other (comments)     Patient states not allergic to this medication.  Iodine Swelling    Nabumetone Swelling    Penicillins Unknown (comments)     Other reaction(s): Other (see comments)    Shellfish Containing Products Swelling    Sulfa (Sulfonamide Antibiotics) Swelling    Tramadol Itching       Past Medical History:   Diagnosis Date    Allergic rhinitis     Anemia     Asthma     Candida vaginitis     CKD (chronic kidney disease) stage 3, GFR 30-59 ml/min ([facility]     COPD (chronic obstructive pulmonary disease) (HCC)     Diabetes ([facility]     Diabetes ([facility])     GERD (gastroesophageal reflux disease)     h pylori gastritis egd 11/14 dr Jeanine Cruz    Headache     Hx of colonoscopy 11/05/2014    normal rpt 10 years, 11/2024, dr. Jeanine Cruz    Hypercholesterolemia     Mild dementia ([facility]     OAB (overactive bladder)     CHRIS on CPAP     Radiculopathy     Urinary incontinence     UTI (urinary tract infection)        Social History     Socioeconomic History    Marital status:      Spouse name: Not on file    Number of children: Not on file    Years of education: Not on file    Highest education level: Not on file   Occupational History    Not on file   Social Needs    Financial resource strain: Not on file    Food insecurity     Worry: Not on file     Inability: Not on file    Transportation needs     Medical: Not on file     Non-medical: Not on file   Tobacco Use    Smoking status: Former Smoker     Years: 20.00    Smokeless tobacco: Former User     Quit date: 1/1/1999   Substance and Sexual Activity    Alcohol use:  Yes    Drug use: No    Sexual activity: Not Currently     Partners: Male   Lifestyle    Physical activity     Days per week: Not on file     Minutes per session: Not on file    Stress: Not on file   Relationships    Social connections     Talks on phone: Not on file     Gets together: Not on file     Attends Restorationism service: Not on file     Active member of club or organization: Not on file     Attends meetings of clubs or organizations: Not on file     Relationship status: Not on file    Intimate partner violence     Fear of current or ex partner: Not on file     Emotionally abused: Not on file     Physically abused: Not on file     Forced sexual activity: Not on file   Other Topics Concern    Not on file   Social History Narrative    Not on file       No family history on file. OBJECTIVE    Physical Exam:     Visit Vitals  /66 (BP 1 Location: Left arm, BP Patient Position: Sitting)   Pulse 77   Temp 99.2 °F (37.3 °C) (Oral)   Resp 16   Ht 5' 1\" (1.549 m)   Wt 175 lb 6.4 oz (79.6 kg)   SpO2 99%   BMI 33.14 kg/m²       General: alert, chronically ill-appearing, AA, in no apparent distress or pain  Head: atraumatic. Non-tender maxillary and frontal sinuses  Eyes: Lids with no discharge, no matting, conjunctivae clear and non injected, full EOMs, PERLLA  Ears: pinna non-tender, external auditory canal patent, TM intact  Neck: supple, no adenopathy palpated  CVS: normal rate, regular rhythm, distinct S1 and S2  Lungs:clear to ausculation bilaterally, no crackles, wheezing or rhonchi noted  Extremities: no edema, no cyanosis, MSK grossly normal  Skin: warm, no lesions, rashes noted  Psych:  mood and affect normal        ASSESSMENT/PLAN  Diagnoses and all orders for this visit:       Type 2 diabetes with nephropathy (HCC)  a1c 6.3>6.9>12.9>8.1  following endo [place] 3/18/2020  On januvia  Increase pravachol dose, LDL Goal <70 (intolerant to lipitor)  Recheck Fasting labs in 3 months  -     METABOLIC PANEL, COMPREHENSIVE; Future  -     LIPID PANEL;  Future    Pure hypercholesterolemia  LDL goal <70  Increase pravachol to 40 mg  Intolerant to lipitor     Schizophrenia, unspecified type ([facility]  Fair control  Following psychiatry  On wellbutrin    Bipolar affective disorder, remission status unspecified ([facility]     Chronic obstructive pulmonary disease, unspecified COPD type ([facility]  Controlled  On ICS, prn WILLARD/ipatropium  Following pulm dr. Briseyda Mahajan    Secondary renal hyperparathyroidism [facility])  Following nephrology  K jose    Anemia of chronic disease  hgb 9-10's  Monitoring  Following nephrology    CKD (chronic kidney disease) stage 3, GFR 30-59 ml/min ([facility])      Gastroesophageal reflux disease, esophagitis presence not specified  On protonix    Chronic allergic rhinitis  Start zyrtec  Following dr. Maryann Huitron      Bilateral carotid artery stenosis  Cont statin, asas     Overactive bladder  Responding to ditropan, to cont    Ff-up in 12 weeks or sooner prseble Davies MD

## 2021-01-27 ENCOUNTER — TELEPHONE (OUTPATIENT)
Dept: FAMILY MEDICINE CLINIC | Age: 71
End: 2021-01-27

## 2021-01-27 NOTE — TELEPHONE ENCOUNTER
Pt would like to know if she will be able to get the vaccination. She was told to call and check before she signs up, please advise.  Thank you

## 2021-01-29 ENCOUNTER — TELEPHONE (OUTPATIENT)
Dept: FAMILY MEDICINE CLINIC | Age: 71
End: 2021-01-29

## 2021-01-29 NOTE — TELEPHONE ENCOUNTER
Patient identified with 2 identifiers (name and ). Patient aware of that she should get the COVID 19 vaccine when available.

## 2021-01-29 NOTE — TELEPHONE ENCOUNTER
Vinay Ferguson from [address] [place] [address] states the insurance will not pay for pt's rx for cetirizine (ZYRTEC) 10 mg tablet because it is a OTC medication. If you have any questions you can call Vinay Ferguson at [phone], reference # [id number]. Please advise.

## 2021-02-05 ENCOUNTER — TELEPHONE (OUTPATIENT)
Dept: FAMILY MEDICINE CLINIC | Age: 71
End: 2021-02-05

## 2021-02-05 NOTE — TELEPHONE ENCOUNTER
Pt has several question about her medication coverage and her cardiologist. Please call pt at your earliest convenience.

## 2021-02-06 NOTE — PROGRESS NOTES
Patient was seen by Dr. Jomar Arriaga 08/19/2019 Pt just back from [place] and feeling anxious about leaving baby although pt would like to leave ASAP to be with children at home.  BP elevated, see MD omar aware.  Pt states she is a very high anxiety person.  Will repeat in 15 minutes.

## 2021-02-15 DIAGNOSIS — E11.21 TYPE 2 DIABETES WITH NEPHROPATHY (HCC): ICD-10-CM

## 2021-02-16 ENCOUNTER — TELEPHONE (OUTPATIENT)
Dept: INTERNAL MEDICINE CLINIC | Age: 71
End: 2021-02-16

## 2021-02-16 NOTE — TELEPHONE ENCOUNTER
Pharmacy Progress Note - Telephone Call    Ms. Pauleen Carrel 65 y.o. was contacted via an outbound telephone call regarding scheduling appointment for diabetes management today. A voicemail was left for patient to return my call. Thank you,  Bob Delvalle, PHARMD, [facility]      CLINICAL PHARMACY CONSULT: MED RECONCILIATION/REVIEW ADDENDUM    For Pharmacy Admin Tracking Only    PHSO: PHSO Patient?: Yes  Time Spent (min): 5

## 2021-02-17 ENCOUNTER — VIRTUAL VISIT (OUTPATIENT)
Dept: INTERNAL MEDICINE CLINIC | Age: 71
End: 2021-02-17

## 2021-02-17 DIAGNOSIS — E11.21 TYPE 2 DIABETES WITH NEPHROPATHY (HCC): Primary | ICD-10-CM

## 2021-02-17 NOTE — Clinical Note
Hello!    I spoke with Ms. Kelsy De Paz. Blood sugars have shot up to the 400s recently per her report. She states that there have not been any changes in medications or diet to account for this change. I am trying to see if she can come in so that I can take a look at her meter before we make any adjustments.      Thanks,  Dudley Delcid

## 2021-02-19 NOTE — PROGRESS NOTES
Pharmacy Progress Note - Diabetes Management    S/O: Ms. Óscar Loja is a 65 y.o. female, referred by Dr. Melissa Peter MD, was contacted today for diabetes management follow up. She was followed last year by previous PharmD. Patient's last A1c was 8.1% per records. Current anti-hyperglycemic regimen include(s):    - Januvia 100 mg daily - was off of medication for about a month because \"pharmacy wasn't sending it\" per patient. She is back on it. ROS:  Today, Pt endorses:  - Symptoms of Hyperglycemia: altered mental status, feeling out of it   - Symptoms of Hypoglycemia: none    Self Monitoring Blood Glucose (SMBG) or CGM:  - Conducts/scans: Fastin daily    - States fasting BG was 498 this morning, and has been in the 400s for the past week   - Sometimes her meter states that her BG value is \"Unreadable\"   - The lowest reading she's seen is 348    A/P:    Diabetes Management:  - Per ADA guidelines, Pt's A1c is not at goal of < 7%. - Current SMBG(s)/CGM trend is above goals and a drastic change from reported readings 1 month ago during visit with PCP (140-160s)  - Would like for patient to come in so that I can take a look at her meter and verify readings   - She also states that she follows with endocrinology but does not have any appointments scheduled for follow up   - Instructed patient to decrease Januvia back to 50 mg once daily because it was dosed for her renal function. If we need to add another agent, we can after reviewing blood sugars   - Patient to check and see when transportation will be available for her to see PharmD.   - Follow up as soon as able     Medication reconciliation was completed during the visit. There are no discontinued medications. Notifications of recommendations will be sent to Dr. Melissa Peter MD for review. Will follow up with patient in 1 week(s).                         CLINICAL PHARMACY CONSULT: MED RECONCILIATION/REVIEW ADDENDUM    For Pharmacy Admin Tracking Only    PHSO: PHSO Patient?: Yes  Total # of Interventions Recommended: Count: 1  - Decreased Dose #: 1  Total Interventions Accepted: 1  Time Spent (min): 20

## 2021-02-22 ENCOUNTER — TELEPHONE (OUTPATIENT)
Dept: INTERNAL MEDICINE CLINIC | Age: 71
End: 2021-02-22

## 2021-02-23 ENCOUNTER — TELEPHONE (OUTPATIENT)
Dept: INTERNAL MEDICINE CLINIC | Age: 71
End: 2021-02-23

## 2021-02-23 NOTE — TELEPHONE ENCOUNTER
Pharmacy Progress Note - Telephone Encounter    S/O: Ms. Pauleen Carrel 65 y.o. female contacted office/me via an inbound telephone call to discuss diabetes management today. Verified patients identifiers (name & ) per HIPAA policy.     - Patient provided PharmD with phone number to contact family friend Jordon Ac who helps her with transportation to appointments   - PharmD contacted Mr. Marilee Short with available appointment times for patient to come in  - Patient now reporting that blood sugars have been down to 150s, after being in the 400s last week. - Would like to take a look at her meter first before making any medication changes to ensure accuracy of what is being reported. - Will wait on return call to see when patient can come in     There are no discontinued medications. No orders of the defined types were placed in this encounter. Thank you,  Bob Delvalle, PHARMD, [facility]        CLINICAL PHARMACY CONSULT: MED RECONCILIATION/REVIEW ADDENDUM    For Pharmacy Admin Tracking Only    PHSO: PHSO Patient?: Yes  Time Spent (min): 15

## 2021-03-03 ENCOUNTER — OFFICE VISIT (OUTPATIENT)
Dept: INTERNAL MEDICINE CLINIC | Age: 71
End: 2021-03-03

## 2021-03-03 DIAGNOSIS — E11.21 TYPE 2 DIABETES WITH NEPHROPATHY (HCC): Primary | ICD-10-CM

## 2021-03-03 NOTE — Clinical Note
Hi there,    I met with Ms. Ellen Mcghee today and I am concerned about her. Blood sugars have been consistently in the 500s and sometimes unreadable when I looked at her meter. She reports headaches, chest pain, confusion and sleeping most of the day. I recommended that she go to the ED or urgent care given her symptoms and blood sugar readings, but I am not sure if she will go. She is very resistant to starting insulin at home, but I am afraid that is our best option right now to get her sugars down quickly. She is requesting a referral to an endocrinologist in [place] - apparently, her previous endocrinologist used to give her insulin in the office whenever she had these \"episodes\". She does not want to go back to [facility] office. She also wanted me to ask if you all are able to give insulin to her in the office? I do not have any samples or anything here.     Thanks,  Nasima Mcghee

## 2021-03-03 NOTE — PROGRESS NOTES
Pharmacy Progress Note - Diabetes Management    S/O: Ms. Ana Lemos is a 65 y.o. female, referred by Dr. Kellie Grewal MD, was seen today for diabetes management visit. Patient's last A1c was 8.6% per provider notes. This is a(n) decrease from 12.6% in 2020. Interim History: Virtual visit completed with patient a few weeks ago. She was reporting high blood sugar readings in the 400s, which was much higher than the 140-160s readings that she reported during her office visit with PCP in January. PharmD requested patient to come into the office to review meter and get a better assessment. Today, she states that she ran out of the  [address] and is waiting for another order to be mailed. She reports feeling very tired and fatigued, to the point that sometimes she just sleeps all day. She also reports having headaches that inhibits her from thinking clearly. Lately, she states she is starting to have increased chest pain as well. Current anti-hyperglycemic regimen include(s):    - Januvia 50 mg once daily - not taking     Patient denies adherence with her medications. She ran out of medication and is waiting on medication to be filled through mail order.   - Patient states that she's tried metformin and glipizide in the past and \"they did not work well\"    ROS:  Today, Pt endorses:  - Symptoms of Hyperglycemia: fatigue and weakness  - Symptoms of Hypoglycemia: none    Self Monitoring Blood Glucose (SMBG) or CGM:  - Brought in home glucometer/blood glucose log/CGM reader today:  yes  - 7 day Av  - 14-day Av  - 30-day Av  - Patient has several readings where sugars have been \"Hi\" and unreadable     Vitals:   Wt Readings from Last 3 Encounters:   21 175 lb 6.4 oz (79.6 kg)   20 155 lb 3.2 oz (70.4 kg)   19 151 lb (68.5 kg)     BP Readings from Last 3 Encounters:   21 130/66   20 140/66   19 119/80     Pulse Readings from Last 3 Encounters: 01/22/21 77   02/27/20 67   12/20/19 61       Past Medical History:   Diagnosis Date    Allergic rhinitis     Anemia     Asthma     Candida vaginitis     CKD (chronic kidney disease) stage 3, GFR 30-59 ml/min (HCC)     COPD (chronic obstructive pulmonary disease) (HCC)     Diabetes (HCC)     Diabetes (HCC)     GERD (gastroesophageal reflux disease)     h pylori gastritis egd 11/14 dr Anca Sparrow    Headache     Hx of colonoscopy 11/05/2014    normal rpt 10 years, 11/2024, dr. Anca Sparrow    Hypercholesterolemia     Mild dementia ([facility] [address]     OAB (overactive bladder)     CHRIS on CPAP     Radiculopathy     Urinary incontinence     UTI (urinary tract infection)      Allergies   Allergen Reactions    Aspirin Other (comments)    Flexeril [Cyclobenzaprine] Other (comments)     Patient states not allergic to this medication.  Iodine Swelling    Nabumetone Swelling    Penicillins Unknown (comments)     Other reaction(s): Other (see comments)    Shellfish Containing Products Swelling    Sulfa (Sulfonamide Antibiotics) Swelling    Tramadol Itching       Current Outpatient Medications   Medication Sig    psyllium husk (METAMUCIL PO) Take  by mouth.  ammonium lactate (AMLACTIN) 12 % topical cream USE 1 APPLICATION TWICE DAILY TO DRY SKIN    bacitracin (BACITRAYCIN PLUS EX) by Apply Externally route.  pravastatin (PRAVACHOL) 40 mg tablet Take 1 Tab by mouth nightly.  cetirizine (ZYRTEC) 10 mg tablet Take 1 Tab by mouth daily as needed for Allergies.  Januvia 50 mg tablet Take 1 Tab by mouth daily.  oxybutynin (DITROPAN) 5 mg tablet Take 1 Tab by mouth two (2) times a day. Take at 8:00 am and 10:00 pm  Indications: overactive bladder    saliva stimulant (Biotene Moisturizing Mouth) spry Take 1 Spray by mouth as needed for Other.  saliva substitution (Biotene Dry Mouth Oral Rinse) mwsh mouthwash 15 mL by Mucous Membrane route as needed.     camphor-menthoL 11-11 % oint by Apply Externally route daily as needed.  montelukast (Singulair) 10 mg tablet Take 1 Tab by mouth daily. At 8:00 am  Indications: controller medication for asthma    pantoprazole (PROTONIX) 40 mg tablet Take 1 Tab by mouth daily. At 8:00 am  Indications: gastroesophageal reflux disease    buPROPion XL (WELLBUTRIN XL) 150 mg tablet Take 175 mg by mouth daily.  cyanocobalamin, vitamin B-12, (VITAMIN B-12 PO) Take 2,500 mcg by mouth daily.  simethicone (GAS-X PO) Take 250 mg by mouth daily as needed.  acetaminophen (TYLENOL) 500 mg capsule Take 1,000 mg by mouth every six (6) hours as needed for Pain (back).  olopatadine (PAZEO) 0.7 % drop Apply 1 Drop to eye daily. both eyes  Indications: Allergic Conjunctivitis    fluticasone-vilanterol (BREO ELLIPTA) 200-25 mcg/dose inhaler Take 1 Puff by inhalation daily. Indications: Controller Medication for Asthma    cycloSPORINE (RESTASIS) 0.05 % ophthalmic emulsion 1 Drop. 1 drop in each eye once a day    ipratropium-albuterol (COMBIVENT RESPIMAT)  mcg/actuation inhaler Take 1 inhalation by mouth Take As Needed daily for asthma     No current facility-administered medications for this visit. Lab Results   Component Value Date/Time    Sodium 144 02/24/2020 11:41 AM    Potassium 3.9 02/24/2020 11:41 AM    Chloride 110 02/24/2020 11:41 AM    CO2 30 02/24/2020 11:41 AM    Anion gap 4 02/24/2020 11:41 AM    Glucose 180 (H) 02/24/2020 11:41 AM    BUN 15 02/24/2020 11:41 AM    Creatinine 1.50 (H) 02/24/2020 11:41 AM    BUN/Creatinine ratio 10 (L) 02/24/2020 11:41 AM    GFR est AA 41 (L) 02/24/2020 11:41 AM    GFR est non-AA 34 (L) 02/24/2020 11:41 AM    Calcium 9.2 02/24/2020 11:41 AM    Bilirubin, total 0.3 02/24/2020 11:41 AM    Alk.  phosphatase 74 02/24/2020 11:41 AM    Protein, total 6.7 02/24/2020 11:41 AM    Albumin 3.4 02/24/2020 11:41 AM    Globulin 3.3 02/24/2020 11:41 AM    A-G Ratio 1.0 02/24/2020 11:41 AM    ALT (SGPT) 20 02/24/2020 11:41 AM       Lab Results Component Value Date/Time    Cholesterol, total 193 02/24/2020 11:41 AM    HDL Cholesterol 95 (H) 02/24/2020 11:41 AM    LDL, calculated 81.8 02/24/2020 11:41 AM    VLDL, calculated 16.2 02/24/2020 11:41 AM    Triglyceride 81 02/24/2020 11:41 AM    CHOL/HDL Ratio 2.0 02/24/2020 11:41 AM       Lab Results   Component Value Date/Time    WBC 4.3 (L) 02/24/2020 11:41 AM    HGB 9.5 (L) 02/24/2020 11:41 AM    HCT 29.0 (L) 02/24/2020 11:41 AM    PLATELET 734 79/49/7193 11:41 AM    MCV 81.2 02/24/2020 11:41 AM       Lab Results   Component Value Date/Time    Microalbumin/Creat ratio (mg/g creat) 106 (H) 02/24/2020 11:41 AM    Microalbumin,urine random 5.08 (H) 02/24/2020 11:41 AM       HbA1c:  Lab Results   Component Value Date/Time    Hemoglobin A1c 12.3 (H) 02/24/2020 11:41 AM    Hemoglobin A1c (POC) 6.3 08/15/2019 11:49 AM    Hemoglobin A1c, External 8.1 12/07/2020     No components found for: 2     Last Point of Care HGB A1C  Hemoglobin A1c (POC)   Date Value Ref Range Status   08/15/2019 6.3 % Final        CrCl cannot be calculated (Patient's most recent lab result is older than the maximum 180 days allowed. ). A/P:    Diabetes Management:  - Per ADA guidelines, Pt's A1c is not at goal of < 7-8%. - Current SMBG(s)/CGM trend is high above fasting and post-prandial goals   - Due to patient's blood sugar levels >500 and symptoms of fatigue, headaches, chest pain and confusion, I recommended that she go to the ED or urgent care. Discussed that she likely needs insulin to help bring sugars down quickly.  Patient expressed understanding.  - Will notify PCP of this concern     Long-term:  - Patient is very resistant to starting an insulin regimen at home or adding any other medications at this time   - Discussed starting a GLP-1 agonist (once weekly injection) first to see if that will help ease her into insulin, but she is still hesitant   - Another option could be an SGLT2i, but she was not ready to add any medications  - She states that she would like a referral to an endocrinologist in [place]. In the past, they were able to give her insulin injections in the office whenever blood sugars spiked high. She would like for me to ask if PCP can do this for her if not the endocrinologist. I let the patient know that we typically do not carry insulin samples in the office, but I will ask.   - Follow up in 1 week or sooner    Medication reconciliation was completed during the visit. There are no discontinued medications. Patient verbalized understanding of the information presented and all of the patients questions were answered. AVS was handed to the patient. Patient advised to call the office with any additional questions or concerns. Notifications of recommendations will be sent to Dr. Ishaan Gaston MD for review. Patient will return to clinic in 1 week(s) for follow up. Thank you,  Carmita Ochoa.  Felicia Sandy, PHARMD, [facility]      CLINICAL PHARMACY CONSULT: MED RECONCILIATION/REVIEW ADDENDUM    For Pharmacy Admin Tracking Only    PHSO: PHSO Patient?: Yes  Time Spent (min): 45

## 2021-03-10 ENCOUNTER — PATIENT OUTREACH (OUTPATIENT)
Dept: CASE MANAGEMENT | Age: 71
End: 2021-03-10

## 2021-03-10 NOTE — PROGRESS NOTES
3/10/2021 11:17 AM     CTN attempted to contact patient for hospital follow up. Patient admitted to [facility] on 3/5/21 and discharged on 3/9/21 for hyperglycemia. Message left introducing myself, the purpose of the call and giving my contact information. Requested that patient call back at her earliest convenience.

## 2021-03-11 ENCOUNTER — PATIENT OUTREACH (OUTPATIENT)
Dept: CASE MANAGEMENT | Age: 71
End: 2021-03-11

## 2021-03-11 NOTE — PROGRESS NOTES
3/11/2021 3:46 PM    CTN attempted to contact patient for hospital follow up. Patient admitted to [facility] on 3/[facility]/21 and discharged on 3/9/21 for hyperglycemia. Message left introducing myself, the purpose of the call and giving my contact information. Requested that patient call back at her earliest convenience.

## 2021-03-11 NOTE — PROGRESS NOTES
Social Work Note  3/11/2021      Date of referral: 2/24/2020  Referral received from: SILKE - Ansley Nicole RN  Reason for referral: To assist with resources needed     Previous  Referral:  no   If yes, brief summary and outcome:  no     Support System: friends, brother     Community Providers: PCP, Endocrinologist, [address] [place] [address]     Transportation: IRide, granddaughter, [facility]     Medication:n/a     Financial Concern: Limited income     Advance Care Plan:  not on file; education provided      Other: n/a     Identified Needs:      · Patient considering to move in the future. · Patient needing [address] [employer] Work Plan:     · Research and provide the patient with community resources for housing opportunities. · Assist patient with contacting  to request a UAI in order to initiate process of Medicaid application.     TOMER received the referral from SILKE to assist the patient with needed community resources.     The patient is a 65year old female who resides independently in the community. MSW attempted to reach the patient by telephone. Left message with information to return call. MSW will follow to assist the patient as needed.    TOMER kerr

## 2021-03-15 ENCOUNTER — APPOINTMENT (OUTPATIENT)
Dept: FAMILY MEDICINE CLINIC | Age: 71
End: 2021-03-15

## 2021-03-15 ENCOUNTER — TELEPHONE (OUTPATIENT)
Dept: FAMILY MEDICINE CLINIC | Age: 71
End: 2021-03-15

## 2021-03-15 NOTE — TELEPHONE ENCOUNTER
[employer] from [address] [place] [address] would like to know if Dr Heidi Zuniga is going to follow her order for home care. Please advise. [employer] [phone].  Thank you

## 2021-03-16 NOTE — TELEPHONE ENCOUNTER
She no showed todays appt, complex pt needs 30 mins, can you move to next thurs 11:30   Make sure daughter is present for visit as well. Will sign home health then. Aston Euceda from [facility] is aware of above message    Left message for patient to schedule a 30 min.  Follow up appt

## 2021-03-19 ENCOUNTER — PATIENT OUTREACH (OUTPATIENT)
Dept: CASE MANAGEMENT | Age: 71
End: 2021-03-19

## 2021-03-19 RX ORDER — DIAZEPAM 5 MG/1
5 TABLET ORAL
COMMUNITY
Start: 2021-03-10 | End: 2022-07-26

## 2021-03-19 NOTE — PROGRESS NOTES
Care Transitions Initial Follow Up Call    Call within 2 business days of discharge: 1st attempt made day after discharge    Patient: Ashley Bowels Patient : 1950 MRN: [id number]    Primary Diagnosis: hyperglycemia    Challenges to be reviewed by the provider   Additional needs identified to be addressed with provider yes  wilfrido has had back pain that started during admission. patient returned to ED on 3/10/21 and was given lidocain patches but states that they will not stay on         Method of communication with provider : chart routing, staff message    Discussed COVID-19 related testing which was not done at this time. Test results were not done. Patient informed of results, if available? no     Advance Care Planning:   Does patient have an Advance Directive: not on file     Inpatient Readmission Risk score: No data recorded  Was this a readmission? no   Patient stated reason for the admission: blood sugar 700 and PCP sent her to hospital    Patients top risk factors for readmission: medical condition and medication management COPD, asthma and diabetes  Interventions to address risk factors: Obtained and reviewed discharge summary and/or continuity of care documents, Education of patient/family/caregiver/guardian to support self-management-discussed pain relief stratogies sucha as heat and ice and Assessment and support for treatment adherence and medication management-reviewed medications to include name, dose and indication    Care Transition Nurse contacted the patient by telephone to perform post hospital discharge assessment. Verified name and  with patient as identifiers. Provided introduction to self, and explanation of the Care Transition Nurse role. Care Transition Nurse reviewed discharge instructions, medical action plan and red flags with patient who verbalized understanding. Were discharge instructions available to patient? yes.  Reviewed appropriate site of care based on symptoms and resources available to patient including: PCP and CTN. Patient given an opportunity to ask questions and does not have any further questions or concerns at this time. The patient agrees to contact the PCP office for questions related to their healthcare.     Medication reconciliation was performed with patient, who verbalizes understanding of administration of home medications. Advised obtaining a 90-day supply of all daily and as-needed medications.   Referral to Pharm D needed: no     Home Health/Outpatient orders at discharge: home health care, PT and SN  Home Health company: [employer]  Date of initial visit: 3/10/21    Durable Medical Equipment ordered at discharge: None      Covid Risk Education    Patient has following risk factors of: COPD, asthma and diabetes. Education provided regarding infection prevention, and signs and symptoms of COVID-19 and when to seek medical attention with patient who verbalized understanding. Discussed exposure protocols and quarantine From CDC: Are you at higher risk for severe illness?  and given an opportunity for questions and concerns. The patient agrees to contact the COVID-19 hotline [phone] or PCP office for questions related to COVID-19.     For more information on steps you can take to protect yourself, see CDC's How to Protect Yourself   Patient states she received her first COVID shot 3/18/21 and her next shot will be 4/15/21  Was patient discharged with a pulse oximeter? yes Discussed and confirmed pulse oximeter discharge instructions and when to notify provider or seek emergency care.    Patient/family/caregiver given information for [employer] and agrees to enroll no  Patient's preferred e-mail: declines  Patient's preferred phone number: declines    Discussed follow-up appointments. If no appointment was previously scheduled, appointment scheduling offered: yes Is follow up appointment scheduled within 7 days of discharge? no  Patient missed  the origional sheduled appointment  [address] follow up appointment(s):   Future Appointments   Date Time Provider Sherri Anguiano   3/25/2021 11:30 AM Jagruti Capone MD [facility]     Non-[facility] follow up appointment(s): TBD    Plan for follow-up call in 7-10 days based on severity of symptoms and risk factors. Plan for next call: symptom management-ask about back pain and follow up appointment-PCP appointment3/25/21  Care Transition Nurse provided contact information for future needs. Goals Addressed                 This Visit's Progress     COMPLETED: Attends follow up appointments on schedule        02/24/20 Patient will attend all scheduled appointments through 05/24/20       COMPLETED: Knowledge and adherence of prescribed medication (ie. action, side effects, missed dose, etc.).        02/24/20 Pt will take all medications prescribed to be evaluated on each outreach through  05/24/20       COMPLETED: Prepare patients and caregivers for end of life decisions (ie. need for hospice, pain management, symptom relief, advance directives etc.)        02/24/20 Pt will complete an ACP and have it scanned into their EMR by 05/24/20       Prevent complications post hospitalization. 1. CTN will monitor X 4 weeks    2. Ensure provider appt is scheduled within 7 days post-discharge  3/19/2021 PCP follow up 3/25/21. Patient missed her original appointment that was scheduled on 3/15/21  3. Confirm patient attended post-discharge provider apt    4. Complete post-visit call to confirm attendance and update care needs      5. Review/educate common or potential \"red flags\" of condition worsening    6. Evaluate adherence to medications and priority barriers to resolve        7. Instruct on adherence to medications as ordered and assess for therapeutic response and side-effects         8. Discuss and evaluate ADL performance.   Provide recommendations on energy conservation, particularly related to transition home from an inpatient admission.

## 2021-03-22 ENCOUNTER — PATIENT OUTREACH (OUTPATIENT)
Dept: CASE MANAGEMENT | Age: 71
End: 2021-03-22

## 2021-03-22 NOTE — PROGRESS NOTES
Social Work Note  3/22/2021    Date of referral: 2020  Referral received from: TANYA - Joel Nicole RN  Reason for referral: To assist with resources needed     Previous  Referral:  no   If yes, brief summary and outcome:  no     Support System: friends, brother     Community Providers: PCP, Endocrinologist, [address] [place] [address]     Transportation: IRide, granddaughter, [address] [facility]     Medication:n/a     Financial Concern: Limited income     Advance Care Plan:  not on file; education provided      Other: n/a     Identified Needs:      · Patient considering to move in the future. · Patient needing [address] Work Plan:     · Research and provide the patient with community resources for housing opportunities. · Assist patient with contacting  to request a UAI in order to initiate process of Medicaid application.     MSW received the referral from [facility] to assist the patient with needed community resources.      The patient is a 65year old female who resides independently in the community. MSW introduced self, role and reason for call. The patient verified her name and  as identifiers. The patient reported that she had been hospitalized and stated that she is feeling much better. She mentioned that she has people assisting her with resources since her hospitalization and has been receiving lots of phone calls. She expressed appreciation for all the assistance. The patient informed MSW that her daughter is not as supportive as her granddaughter. She shared that her PCP plans on speaking with her family members regarding her health and an appointment have been scheduled. Ms Kristen Rater informed MSW that she receives Meals on Wheels and have been receiving a lot of pasta meals which is not helping with her diabetes.  MSW provided the patient with information on [employer] which caters based on specific diets and provides meals post recent hospitalization. MSW contacted [employer] and is currently awaiting response. MSW also provided the patient with contact information to [employer]. MSW offered to have company contact the patient but she declined saying that she already have too many people contacting her and she cannot keep up with everyone. She prefers to contact the company instead. The patient stated that she had no other concerns at this time.   MSW will be available to assist the patient as needed.

## 2021-03-25 ENCOUNTER — VIRTUAL VISIT (OUTPATIENT)
Dept: FAMILY MEDICINE CLINIC | Age: 71
End: 2021-03-25
Payer: MEDICARE

## 2021-03-25 ENCOUNTER — TELEPHONE (OUTPATIENT)
Dept: FAMILY MEDICINE CLINIC | Age: 71
End: 2021-03-25

## 2021-03-25 DIAGNOSIS — N18.31 STAGE 3A CHRONIC KIDNEY DISEASE (HCC): ICD-10-CM

## 2021-03-25 DIAGNOSIS — E11.42 TYPE 2 DIABETES MELLITUS WITH DIABETIC POLYNEUROPATHY, WITHOUT LONG-TERM CURRENT USE OF INSULIN (HCC): Primary | ICD-10-CM

## 2021-03-25 DIAGNOSIS — F33.9 RECURRENT DEPRESSION (HCC): ICD-10-CM

## 2021-03-25 DIAGNOSIS — E11.21 TYPE 2 DIABETES WITH NEPHROPATHY (HCC): ICD-10-CM

## 2021-03-25 DIAGNOSIS — F20.9 SCHIZOPHRENIA, UNSPECIFIED TYPE (HCC): ICD-10-CM

## 2021-03-25 DIAGNOSIS — K21.9 GASTROESOPHAGEAL REFLUX DISEASE, UNSPECIFIED WHETHER ESOPHAGITIS PRESENT: ICD-10-CM

## 2021-03-25 DIAGNOSIS — E78.00 PURE HYPERCHOLESTEROLEMIA: ICD-10-CM

## 2021-03-25 DIAGNOSIS — J44.9 CHRONIC OBSTRUCTIVE PULMONARY DISEASE, UNSPECIFIED COPD TYPE (HCC): ICD-10-CM

## 2021-03-25 PROCEDURE — G8399 PT W/DXA RESULTS DOCUMENT: HCPCS | Performed by: FAMILY MEDICINE

## 2021-03-25 PROCEDURE — 3046F HEMOGLOBIN A1C LEVEL >9.0%: CPT | Performed by: FAMILY MEDICINE

## 2021-03-25 PROCEDURE — 1101F PT FALLS ASSESS-DOCD LE1/YR: CPT | Performed by: FAMILY MEDICINE

## 2021-03-25 PROCEDURE — G9899 SCRN MAM PERF RSLTS DOC: HCPCS | Performed by: FAMILY MEDICINE

## 2021-03-25 PROCEDURE — 2022F DILAT RTA XM EVC RTNOPTHY: CPT | Performed by: FAMILY MEDICINE

## 2021-03-25 PROCEDURE — 1090F PRES/ABSN URINE INCON ASSESS: CPT | Performed by: FAMILY MEDICINE

## 2021-03-25 PROCEDURE — 3017F COLORECTAL CA SCREEN DOC REV: CPT | Performed by: FAMILY MEDICINE

## 2021-03-25 PROCEDURE — G8428 CUR MEDS NOT DOCUMENT: HCPCS | Performed by: FAMILY MEDICINE

## 2021-03-25 PROCEDURE — G0463 HOSPITAL OUTPT CLINIC VISIT: HCPCS | Performed by: FAMILY MEDICINE

## 2021-03-25 PROCEDURE — 99215 OFFICE O/P EST HI 40 MIN: CPT | Performed by: FAMILY MEDICINE

## 2021-03-25 PROCEDURE — G9717 DOC PT DX DEP/BP F/U NT REQ: HCPCS | Performed by: FAMILY MEDICINE

## 2021-03-25 RX ORDER — DIAZEPAM 5 MG/1
5 TABLET ORAL
Status: CANCELLED | OUTPATIENT
Start: 2021-03-25

## 2021-03-25 RX ORDER — GABAPENTIN 100 MG/1
100 CAPSULE ORAL DAILY
COMMUNITY
Start: 2021-03-09 | End: 2022-07-26

## 2021-03-25 RX ORDER — INSULIN ASPART 100 [IU]/ML
6 INJECTION, SOLUTION INTRAVENOUS; SUBCUTANEOUS
COMMUNITY
Start: 2021-03-09 | End: 2021-04-14 | Stop reason: SDUPTHER

## 2021-03-25 RX ORDER — METAXALONE 400 MG/1
400 TABLET ORAL 3 TIMES DAILY
Qty: 12 TAB | Refills: 0 | Status: SHIPPED | OUTPATIENT
Start: 2021-03-25 | End: 2021-03-26 | Stop reason: SDUPTHER

## 2021-03-25 NOTE — TELEPHONE ENCOUNTER
Pt states that the [facility] doesn't have her RX Metaxalone and she would like it sent to the [address] [place] [address]. She would also like to get something for her constipation. Please advise.

## 2021-03-26 RX ORDER — METAXALONE 400 MG/1
400 TABLET ORAL 3 TIMES DAILY
Qty: 21 TAB | Refills: 0 | Status: SHIPPED | OUTPATIENT
Start: 2021-03-26 | End: 2021-04-06

## 2021-03-26 RX ORDER — METAXALONE 400 MG/1
400 TABLET ORAL 3 TIMES DAILY
Qty: 12 TAB | Refills: 0 | Status: SHIPPED | OUTPATIENT
Start: 2021-03-26 | End: 2021-03-26 | Stop reason: SDUPTHER

## 2021-03-26 NOTE — TELEPHONE ENCOUNTER
E-scribed script from 3/25/2021 to [facility] canceled with that pharmacy. E-scribed script sent to [facility] for Metaxalone 400 mg TID  # 12  instead of #21. Spoke with Viki Ibarra and corrected quantity to #21 tabs.

## 2021-03-26 NOTE — TELEPHONE ENCOUNTER
Patient advised that medication has been sent to pharmacy. She also c/o abdominal bloating without pain. Patient states she has been constipated for several months. She has tried Gentle laxative with no relief and Miralax which caused diarrhea. Patient's last bowel movement was today and consisted multiple small balls. Prior to today last bowel movement was 2 days ago and was similar to today's.

## 2021-03-29 ENCOUNTER — VIRTUAL VISIT (OUTPATIENT)
Dept: INTERNAL MEDICINE CLINIC | Age: 71
End: 2021-03-29

## 2021-03-29 DIAGNOSIS — R10.9 ABDOMINAL PAIN, UNSPECIFIED ABDOMINAL LOCATION: ICD-10-CM

## 2021-03-29 DIAGNOSIS — E11.21 TYPE 2 DIABETES WITH NEPHROPATHY (HCC): Primary | ICD-10-CM

## 2021-03-29 DIAGNOSIS — K59.00 CONSTIPATION, UNSPECIFIED CONSTIPATION TYPE: Primary | ICD-10-CM

## 2021-03-29 NOTE — TELEPHONE ENCOUNTER
Pt called and stated she lost her   Requested Prescriptions     Pending Prescriptions Disp Refills    insulin detemir U-100 (LEVEMIR FLEXTOUCH) 100 unit/mL (3 mL) inpn        Si Units by SubCUTAneous route.      Could please resend a script to [facility] [phone]

## 2021-03-29 NOTE — PROGRESS NOTES
Fer Middleton is a 65 y.o. female who was seen by synchronous (real-time) audio-video technology on 3/25/2021. Consent: Fer Middleton, who was seen by synchronous (real-time) audio-video technology, and/or her healthcare decision maker, is aware that this patient-initiated, Telehealth encounter on 3/25/2021 is a billable service, with coverage as determined by her insurance carrier. She is aware that she may receive a bill and has provided verbal consent to proceed: Yes. 712  Subjective: Fer Middleton is a 65 y.o. female who was seen for Hospital Follow Up (03/05/2021-03/09/2021 Diabetes) and Back Pain    Ff-up admission 3/5-3/9 for hyperglycemia    DM w/ nephropathy and neuropathy- a1c 15.9 did not appear to be in DKA. Says ran out  [place] and [employer] and lost to ff-up with endocrinology   previous a1c 6.9>12.6 >8.1. She was following Ángela diehl, previously recommended against insulin due to safety concern with administration. we have facilitated scheduling ff-up today, April 29 12:40    She says she is compliant with insulin regimen since discharge. She continues to receive meals on wheels food 3x a week and grocery shops with her niece once a week. Currently on lantus, 25 units,  6 units mealtime insulin and januvia 25 mg. Reports preprandial BG readings are 129-355. She is also following our dm educator closely. Home health is following as well    Considering gastroparesis with nausea and constipation. She says respodning to to protonix, prn pheneran and colace. CKD3-4-/anemia- following dr. Tanner MUJICA- on pravachol/ASA    C/o low back spasm since admission, denies incontinence, weakness. Says hospital bed was stiff and responding well to muscle relaxants given by ED. Schizophrenia/insomnia- on wellbutrin, celexa    COPD- no symptoms, on breo prn albuterol    Prior to Admission medications    Medication Sig Start Date End Date Taking?  Authorizing Provider   insulin aspart U-100 (NOVOLOG) 100 unit/mL (3 mL) inpn 6 Units by SubCUTAneous route. 3/9/21  Yes Provider, Historical   insulin detemir U-100 (LEVEMIR FLEXTOUCH) 100 unit/mL (3 mL) inpn 25 Units by SubCUTAneous route. 3/9/21  Yes Provider, Historical   gabapentin (NEURONTIN) 100 mg capsule Take 100 mg by mouth daily. 3/9/21  Yes Provider, Historical   diazePAM (VALIUM) 5 mg tablet Take 5 mg by mouth every six (6) hours as needed for Pain. 3/10/21  Yes Provider, Historical   ammonium lactate (AMLACTIN) 12 % topical cream USE 1 APPLICATION TWICE DAILY TO DRY SKIN 12/22/20  Yes Provider, Historical   bacitracin (BACITRAYCIN PLUS EX) by Apply Externally route. Yes Provider, Historical   pravastatin (PRAVACHOL) 40 mg tablet Take 1 Tab by mouth nightly. 1/22/21  Yes Aury Hayes MD   cetirizine (ZYRTEC) 10 mg tablet Take 1 Tab by mouth daily as needed for Allergies. 1/22/21  Yes Aury Hayes MD   Januvia 50 mg tablet Take 1 Tab by mouth daily. Patient taking differently: Take 25 mg by mouth daily. 1/8/21  Yes Aury Hayes MD   oxybutynin (DITROPAN) 5 mg tablet Take 1 Tab by mouth two (2) times a day. Take at 8:00 am and 10:00 pm  Indications: overactive bladder 7/28/20  Yes Aury Hayes MD   saliva stimulant (Biotene Moisturizing Mouth) spry Take 1 Spray by mouth as needed for Other. Yes Provider, Historical   camphor-menthoL 11-11 % oint by Apply Externally route daily as needed. Yes Provider, Historical   montelukast (Singulair) 10 mg tablet Take 1 Tab by mouth daily. At 8:00 am  Indications: controller medication for asthma 6/2/20  Yes Aury Hayes MD   pantoprazole (PROTONIX) 40 mg tablet Take 1 Tab by mouth daily. At 8:00 am  Indications: gastroesophageal reflux disease 6/2/20  Yes Aury Hayes MD   buPROPion XL (WELLBUTRIN XL) 150 mg tablet Take 150 mg by mouth daily. 5/9/20  Yes Provider, Historical   cyanocobalamin, vitamin B-12, (VITAMIN B-12 PO) Take 2,500 mcg by mouth daily.    Yes Provider, Historical simethicone (GAS-X PO) Take 250 mg by mouth daily as needed. Yes Provider, Historical   acetaminophen (TYLENOL) 500 mg capsule Take 1,000 mg by mouth every six (6) hours as needed for Pain (back). Yes Provider, Historical   fluticasone-vilanterol (BREO ELLIPTA) 200-25 mcg/dose inhaler Take 1 Puff by inhalation daily. Indications: Controller Medication for Asthma   Yes Provider, Historical   ipratropium-albuterol (COMBIVENT RESPIMAT)  mcg/actuation inhaler Take 1 inhalation by mouth Take As Needed daily for asthma 8/28/19  Yes Provider, Historical   metaxalone (SKELAXIN) 400 mg tablet Take 1 Tab by mouth three (3) times daily. 3/26/21   Kulwinder Enciso MD   psyllium husk (METAMUCIL PO) Take  by mouth. Provider, Historical   olopatadine (PAZEO) 0.7 % drop Apply 1 Drop to eye daily. both eyes  Indications: Allergic Conjunctivitis    Provider, Historical     Allergies   Allergen Reactions    Aspirin Other (comments)    Flexeril [Cyclobenzaprine] Other (comments)     Patient states not allergic to this medication.  Iodine Swelling    Nabumetone Swelling    Penicillins Unknown (comments)     Other reaction(s):  Other (see comments)    Shellfish Containing Products Swelling    Sulfa (Sulfonamide Antibiotics) Swelling    Tramadol Itching       Patient Active Problem List    Diagnosis Date Noted    [facility] 01/23/2021    Chronic obstructive pulmonary disease ([facility] 06/04/2020    Type 2 diabetes with nephropathy ([facility] 02/27/2020    Gastroesophageal reflux disease 11/29/2019    Bipolar affective disorder ([facility] 08/19/2019    Schizophrenia [facility] 08/19/2019    CKD (chronic kidney disease) stage 3, GFR 30-59 ml/min 08/15/2019    Cerebral microvascular disease 05/13/2019    Gait disturbance 05/13/2019    Frequent falls 05/13/2019    Bilateral carotid artery stenosis 05/13/2019    Type 2 diabetes mellitus with diabetic polyneuropathy, without long-term current use of insulin ([facility] 05/13/2019    Recurrent depression ([facility] 01/03/2019    Anemia of chronic disease 08/20/2018    Type 2 diabetes mellitus without complication, without long-term current use of insulin ([facility] 02/15/2018    Pure hypercholesterolemia 01/15/2018    Overactive bladder 01/15/2018    Chronic allergic rhinitis 01/15/2018    Anemia 55/89/0182    Uncomplicated asthma 90/51/6356     Current Outpatient Medications   Medication Sig Dispense Refill    insulin aspart U-100 (NOVOLOG) 100 unit/mL (3 mL) inpn 6 Units by SubCUTAneous route.  insulin detemir U-100 (LEVEMIR FLEXTOUCH) 100 unit/mL (3 mL) inpn 25 Units by SubCUTAneous route.  gabapentin (NEURONTIN) 100 mg capsule Take 100 mg by mouth daily.  diazePAM (VALIUM) 5 mg tablet Take 5 mg by mouth every six (6) hours as needed for Pain.  ammonium lactate (AMLACTIN) 12 % topical cream USE 1 APPLICATION TWICE DAILY TO DRY SKIN      bacitracin (BACITRAYCIN PLUS EX) by Apply Externally route.  pravastatin (PRAVACHOL) 40 mg tablet Take 1 Tab by mouth nightly. 90 Tab 0    cetirizine (ZYRTEC) 10 mg tablet Take 1 Tab by mouth daily as needed for Allergies. 90 Tab 0    Januvia 50 mg tablet Take 1 Tab by mouth daily. (Patient taking differently: Take 25 mg by mouth daily.) 90 Tab 0    oxybutynin (DITROPAN) 5 mg tablet Take 1 Tab by mouth two (2) times a day. Take at 8:00 am and 10:00 pm  Indications: overactive bladder 180 Tab 3    saliva stimulant (Biotene Moisturizing Mouth) spry Take 1 Spray by mouth as needed for Other.  camphor-menthoL 11-11 % oint by Apply Externally route daily as needed.  montelukast (Singulair) 10 mg tablet Take 1 Tab by mouth daily. At 8:00 am  Indications: controller medication for asthma 90 Tab 3    pantoprazole (PROTONIX) 40 mg tablet Take 1 Tab by mouth daily.  At 8:00 am  Indications: gastroesophageal reflux disease 90 Tab 3    buPROPion XL (WELLBUTRIN XL) 150 mg tablet Take 150 mg by mouth daily.      cyanocobalamin, vitamin B-12, (VITAMIN B-12 PO) Take 2,500 mcg by mouth daily.  simethicone (GAS-X PO) Take 250 mg by mouth daily as needed.  acetaminophen (TYLENOL) 500 mg capsule Take 1,000 mg by mouth every six (6) hours as needed for Pain (back).  fluticasone-vilanterol (BREO ELLIPTA) 200-25 mcg/dose inhaler Take 1 Puff by inhalation daily. Indications: Controller Medication for Asthma      ipratropium-albuterol (COMBIVENT RESPIMAT)  mcg/actuation inhaler Take 1 inhalation by mouth Take As Needed daily for asthma      metaxalone (SKELAXIN) 400 mg tablet Take 1 Tab by mouth three (3) times daily. 21 Tab 0    psyllium husk (METAMUCIL PO) Take  by mouth.  olopatadine (PAZEO) 0.7 % drop Apply 1 Drop to eye daily. both eyes  Indications: Allergic Conjunctivitis       Allergies   Allergen Reactions    Aspirin Other (comments)    Flexeril [Cyclobenzaprine] Other (comments)     Patient states not allergic to this medication.  Iodine Swelling    Nabumetone Swelling    Penicillins Unknown (comments)     Other reaction(s):  Other (see comments)    Shellfish Containing Products Swelling    Sulfa (Sulfonamide Antibiotics) Swelling    Tramadol Itching     Past Medical History:   Diagnosis Date    Allergic rhinitis     Anemia     Asthma     Candida vaginitis     CKD (chronic kidney disease) stage 3, GFR 30-59 ml/min (HCC)     COPD (chronic obstructive pulmonary disease) (HCC)     Diabetes (HCC)     [facility]     GERD (gastroesophageal reflux disease)     h pylori gastritis egd 11/14 dr Husam Richards    Headache     Hx of colonoscopy 11/05/2014    normal rpt 10 years, 11/2024, dr. Husam Richards    Hypercholesterolemia     Mild dementia ([facility]     OAB (overactive bladder)     CHRIS on CPAP     Radiculopathy     Urinary incontinence     UTI (urinary tract infection)      Past Surgical History:   Procedure Laterality Date    HX COLONOSCOPY  11/05/2014    Normal/Repeat in 10 years/Dr. Gil Hernandez    HX HYSTERECTOMY  1970    HX ORTHOPAEDIC Right 02/2012    knee replacement      No family history on file. Social History     Tobacco Use    Smoking status: Former Smoker     Years: 20.00    Smokeless tobacco: Former User     Quit date: 1/1/1999   Substance Use Topics    Alcohol use: Yes       ROS      Objective: There were no vitals taken for this visit. General: alert, cooperative, no distress   Mental  status: normal mood, behavior, speech, dress, motor activity, and thought processes, able to follow commands   HENT: NCAT   Neck: no visualized mass   Resp: no respiratory distress   Neuro: no gross deficits   Skin: no discoloration or lesions of concern on visible areas   Psychiatric: normal affect, consistent with stated mood, no evidence of hallucinations     Additional exam findings: We discussed the expected course, resolution and complications of the diagnosis(es) in detail. Medication risks, benefits, costs, interactions, and alternatives were discussed as indicated. I advised her to contact the office if her condition worsens, changes or fails to improve as anticipated. She expressed understanding with the diagnosis(es) and plan. Byron Farah is a 65 y.o. female who was evaluated by a video visit encounter for concerns as above. Patient identification was verified prior to start of the visit. A caregiver was present when appropriate. Due to this being a TeleHealth encounter (During VVRRI-58 public health emergency), evaluation of the following organ systems was limited: Vitals/Constitutional/EENT/Resp/CV/GI//MS/Neuro/Skin/Heme-Lymph-Imm.   Pursuant to the emergency declaration under the [address] [place] [address], [address] [place] [address] authority and the [employer] and [employer] Act, this Virtual  Visit was conducted, with patient's (and/or legal guardian's) consent, to reduce the patient's risk of exposure to COVID-19 and provide necessary medical care. Services were provided through a video synchronous discussion virtually to substitute for in-person clinic visit. Patient and provider were located at their individual homes. Assessment & Plan:    Type 2 diabetes with nephropathy (HCC)  a1c 6.3>6.9>12.9>8.1>15.6  With hyperglycemia, restarted insulin during admission 3/5  Increase lantus to 27 units  Increase mealtime insulin to 10 units  Cont januvia 25  Needs much education/poor social situation/ consider CGM if able?   Cont home health  following endo Debby Bello 4/29, 12:40  Follow with Dm educator appt 3/29  Cont  pravachol, LDL Goal <70 (intolerant to lipitor)  Cont neurontin    Type 2 diabetes with neuropathy (HCC)    Pure hypercholesterolemia  LDL goal <70  Cont pravachol/ASA  Intolerant to lipitor      Schizophrenia, unspecified type ([facility]  Fair control  Following psychiatry  On wellbutrin, celexa     Bipolar affective disorder, remission status unspecified ([facility]      Chronic obstructive pulmonary disease, unspecified COPD type ([facility]  Controlled  On ICS, prn WILLARD/ipatropium  Following pulm dr. Vinayak Ching     Secondary renal hyperparathyroidism [facility])  Following nephrology  DALTON garcia     Anemia of chronic disease  hgb 9-10's  Monitoring  Following nephrology     CKD (chronic kidney disease) stage 3, GFR 30-59 ml/min ([facility])        Gastroesophageal reflux disease, esophagitis presence not specified  On protonix       Bilateral carotid artery stenosis  Cont statin, asas      Overactive bladder  Responding to ditropan, to cont     Back spasm  Refilled skelaxin    I spent greater than  40 minutes with pt counseling and coordinating care     Ff-up in 6 weeks or sooner prn    Carrie Stewart MD

## 2021-03-29 NOTE — TELEPHONE ENCOUNTER
Suspected gastroparesis with uncontrolled DM  Refer to GI for further evaluation, prn miralax for now. pls notify pt.

## 2021-03-29 NOTE — TELEPHONE ENCOUNTER
Pt states that the RX metaxalone (SKELAXIN) 400 mg tablet    Is too expensive and would like to know if there is something else that can be prescribed.  Thank you

## 2021-03-30 RX ORDER — PEN NEEDLE, DIABETIC 31 GX3/16"
NEEDLE, DISPOSABLE MISCELLANEOUS
Qty: 100 PEN NEEDLE | Refills: 11 | Status: SHIPPED | OUTPATIENT
Start: 2021-03-30 | End: 2021-04-14 | Stop reason: SDUPTHER

## 2021-03-30 NOTE — PROGRESS NOTES
Pharmacy Progress Note - Diabetes Management    S/O: Ms. Óscar Loja is a 65 y.o. female, referred by Dr. Melissa Peter MD was contacted today for diabetes management follow up. Patient's last A1c was 15.9% in March 2021. Interim update: Since last visit, patient was admitted to the hospital for treatment of elevated blood sugars. It was found during that admission that A1c had increased to 15.9%. She was started on a basal-bolus insulin regimen and states that she is feeling better and doing okay with the injections. She has been followed case management since her discharge and it appears that patient will be followed by [facility] also. She states that she was told to check blood sugars 8 times daily which is really hurting her fingers. She has a follow up appointment scheduled with endocrinology on 4/29/21. Current anti-hyperglycemic regimen include(s):    - Levemir 27 units once daily  - Novolog 10 units TID with meals - patient states she was taking this after meals  - Januvia 25 mg once daily     Insulin doses were recently increased by PCP last week. Patient reports adherence to this medication regimen. However, she does state that she is running out of pen needles. ROS:  Today, Pt endorses:  - Symptoms of Hyperglycemia: none - feeling better on the insulin   - Symptoms of Hypoglycemia: none    Self Monitoring Blood Glucose (SMBG) or CGM:  - Conducts/scans: 8 times per day per patient (fasting, before and after meals, bedtime)  - This morning she states blood sugar was 100  - Overall, prandial blood sugars have been in the 100-200s  - She states that she has not seen any readings in the 300s or 400s since insulin was increased last week   - The lowest reading she's seen has been 81 before a meal    Vitals:   Wt Readings from Last 3 Encounters:   01/22/21 175 lb 6.4 oz (79.6 kg)   02/27/20 155 lb 3.2 oz (70.4 kg)   12/20/19 151 lb (68.5 kg)     BP Readings from Last 3 Encounters:   01/22/21 130/66   02/27/20 140/66   12/20/19 119/80     Pulse Readings from Last 3 Encounters:   01/22/21 77   02/27/20 67   12/20/19 61       Past Medical History:   Diagnosis Date    Allergic rhinitis     Anemia     Asthma     Candida vaginitis     CKD (chronic kidney disease) stage 3, GFR 30-59 ml/min (HCC)     COPD (chronic obstructive pulmonary disease) (HCC)     Diabetes (HCC)     Diabetes (HCC)     GERD (gastroesophageal reflux disease)     h pylori gastritis egd 11/14 dr Elizabeth Goff    Headache     Hx of colonoscopy 11/05/2014    normal rpt 10 years, 11/2024, dr. Elizabeth Goff    Hypercholesterolemia     Mild dementia [facility]     OAB (overactive bladder)     CHRIS on CPAP     Radiculopathy     Urinary incontinence     UTI (urinary tract infection)      Allergies   Allergen Reactions    Aspirin Other (comments)    Flexeril [Cyclobenzaprine] Other (comments)     Patient states not allergic to this medication.  Iodine Swelling    Nabumetone Swelling    Penicillins Unknown (comments)     Other reaction(s): Other (see comments)    Shellfish Containing Products Swelling    Sulfa (Sulfonamide Antibiotics) Swelling    Tramadol Itching       Current Outpatient Medications   Medication Sig    metaxalone (SKELAXIN) 400 mg tablet Take 1 Tab by mouth three (3) times daily.  insulin aspart U-100 (NOVOLOG) 100 unit/mL (3 mL) inpn 6 Units by SubCUTAneous route.  insulin detemir U-100 (LEVEMIR FLEXTOUCH) 100 unit/mL (3 mL) inpn 25 Units by SubCUTAneous route.  gabapentin (NEURONTIN) 100 mg capsule Take 100 mg by mouth daily.  diazePAM (VALIUM) 5 mg tablet Take 5 mg by mouth every six (6) hours as needed for Pain.  psyllium husk (METAMUCIL PO) Take  by mouth.  ammonium lactate (AMLACTIN) 12 % topical cream USE 1 APPLICATION TWICE DAILY TO DRY SKIN    bacitracin (BACITRAYCIN PLUS EX) by Apply Externally route.  pravastatin (PRAVACHOL) 40 mg tablet Take 1 Tab by mouth nightly.     cetirizine (ZYRTEC) 10 mg tablet Take 1 Tab by mouth daily as needed for Allergies.  Januvia 50 mg tablet Take 1 Tab by mouth daily. (Patient taking differently: Take 25 mg by mouth daily.)    oxybutynin (DITROPAN) 5 mg tablet Take 1 Tab by mouth two (2) times a day. Take at 8:00 am and 10:00 pm  Indications: overactive bladder    saliva stimulant (Biotene Moisturizing Mouth) spry Take 1 Spray by mouth as needed for Other.  camphor-menthoL 11-11 % oint by Apply Externally route daily as needed.  montelukast (Singulair) 10 mg tablet Take 1 Tab by mouth daily. At 8:00 am  Indications: controller medication for asthma    pantoprazole (PROTONIX) 40 mg tablet Take 1 Tab by mouth daily. At 8:00 am  Indications: gastroesophageal reflux disease    buPROPion XL (WELLBUTRIN XL) 150 mg tablet Take 150 mg by mouth daily.  cyanocobalamin, vitamin B-12, (VITAMIN B-12 PO) Take 2,500 mcg by mouth daily.  simethicone (GAS-X PO) Take 250 mg by mouth daily as needed.  acetaminophen (TYLENOL) 500 mg capsule Take 1,000 mg by mouth every six (6) hours as needed for Pain (back).  olopatadine (PAZEO) 0.7 % drop Apply 1 Drop to eye daily. both eyes  Indications: Allergic Conjunctivitis    fluticasone-vilanterol (BREO ELLIPTA) 200-25 mcg/dose inhaler Take 1 Puff by inhalation daily. Indications: Controller Medication for Asthma    ipratropium-albuterol (COMBIVENT RESPIMAT)  mcg/actuation inhaler Take 1 inhalation by mouth Take As Needed daily for asthma     No current facility-administered medications for this visit.         Lab Results   Component Value Date/Time    Sodium 144 02/24/2020 11:41 AM    Potassium 3.9 02/24/2020 11:41 AM    Chloride 110 02/24/2020 11:41 AM    CO2 30 02/24/2020 11:41 AM    Anion gap 4 02/24/2020 11:41 AM    Glucose 180 (H) 02/24/2020 11:41 AM    BUN 15 02/24/2020 11:41 AM    Creatinine 1.50 (H) 02/24/2020 11:41 AM    BUN/Creatinine ratio 10 (L) 02/24/2020 11:41 AM    GFR est AA 41 (L) 02/24/2020 11:41 AM    GFR est non-AA 34 (L) 02/24/2020 11:41 AM    Calcium 9.2 02/24/2020 11:41 AM    Bilirubin, total 0.3 02/24/2020 11:41 AM    Alk. phosphatase 74 02/24/2020 11:41 AM    Protein, total 6.7 02/24/2020 11:41 AM    Albumin 3.4 02/24/2020 11:41 AM    Globulin 3.3 02/24/2020 11:41 AM    A-G Ratio 1.0 02/24/2020 11:41 AM    ALT (SGPT) 20 02/24/2020 11:41 AM       Lab Results   Component Value Date/Time    Cholesterol, total 193 02/24/2020 11:41 AM    HDL Cholesterol 95 (H) 02/24/2020 11:41 AM    LDL, calculated 81.8 02/24/2020 11:41 AM    VLDL, calculated 16.2 02/24/2020 11:41 AM    Triglyceride 81 02/24/2020 11:41 AM    CHOL/HDL Ratio 2.0 02/24/2020 11:41 AM       Lab Results   Component Value Date/Time    WBC 4.3 (L) 02/24/2020 11:41 AM    HGB 9.5 (L) 02/24/2020 11:41 AM    HCT 29.0 (L) 02/24/2020 11:41 AM    PLATELET 750 26/29/6055 11:41 AM    MCV 81.2 02/24/2020 11:41 AM       Lab Results   Component Value Date/Time    Microalbumin/Creat ratio (mg/g creat) 106 (H) 02/24/2020 11:41 AM    Microalbumin,urine random 5.08 (H) 02/24/2020 11:41 AM       HbA1c:  Lab Results   Component Value Date/Time    Hemoglobin A1c 12.3 (H) 02/24/2020 11:41 AM    Hemoglobin A1c (POC) 6.3 08/15/2019 11:49 AM    Hemoglobin A1c, External 8.1 12/07/2020     No components found for: 2     Last Point of Care HGB A1C  Hemoglobin A1c (POC)   Date Value Ref Range Status   08/15/2019 6.3 % Final        CrCl cannot be calculated (Patient's most recent lab result is older than the maximum 180 days allowed. ). A/P:    Diabetes Management:  - Per ADA guidelines, Pt's A1c is not at goal of < 7-8%. - Current SMBG(s)/CGM trend has improved and is near fasting and post-prandial goals.  However, some readings are still in the 200s   - Informed patient that she can scale back testing to 4 times daily (fasting, before lunch, before dinner and bedtime)  - She would greatly benefit from a CGM and this to be discussed at follow up with endocrinology next month. PharmD can assist as well with obtaining one if needed   - Will send in refills for pen needles to patient's pharmacy   - No adjustments to insulin at this time since blood sugars have improved on current regimen. Instructed patient to give Novolog insulin before her meals instead of after   - Instructed patient to be more mindful of s/sx of hypoglycemia now that she is on the insulin. She states that she has felt fine so far   - Will plan to spend more time on meal planning/diet education once patient is settled in and comfortable with managing her diabetes with the insulin   - Following up weekly with patient until visit with endocrinology next month     Medication reconciliation was completed during the visit. There are no discontinued medications. Notifications of recommendations will be sent to Dr. Shanthi Colorado MD for review. Will follow up with patient in 1 week(s). Thank you,  Sri Roach.  Lm Etienne, AMYD, BCPS                          CLINICAL PHARMACY CONSULT: MED RECONCILIATION/REVIEW ADDENDUM    For Pharmacy Admin Tracking Only    PHSO: PHSO Patient?: Yes  Total # of Interventions Recommended: Count: 2  - Refills Provided #: 1  - Med management   Total Interventions Accepted: 2  Time Spent (min): 30

## 2021-04-01 ENCOUNTER — PATIENT OUTREACH (OUTPATIENT)
Dept: CASE MANAGEMENT | Age: 71
End: 2021-04-01

## 2021-04-01 NOTE — PROGRESS NOTES
Social Work Note  4/1/2021    Date of referral: 2/24/2020  Referral received from: SILKE Nicole RN  Reason for referral: To assist with resources needed     Previous  Referral:  no   If yes, brief summary and outcome:  no     Support System: friends, brother     Community Providers: PCP, Endocrinologist, [address] [place] [address]     Transportation: IRide, granddaughter, [address] [facility]     Medication:n/a     Financial Concern: Limited income     Advance Care Plan:  not on file; education provided      Other: n/a     Identified Needs:      · Patient considering to move in the future. · Patient needing [address] Work Plan:     · Research and provide the patient with community resources for housing opportunities. · Assist patient with contacting  to request a UAI in order to initiate process of Medicaid application.     MSW received the referral from SILKE to assist the patient with needed community resources.      The patient is a 65year old female who resides independently in the community. MSW attempted to contact the patient for weekly follow up. Left message on her voicemail with information to return call. MSW will re-attempt to contact the patient at a later time.      MSW will follow to assist the patient at needed.

## 2021-04-01 NOTE — PROGRESS NOTES
Patient returned my call. She stated that she was with the home health Nurse when I called. Care Transitions Subsequent Call    Additional needs identified to be addressed with provider yes  patient is having severe back pain and needs a referal to specialist   Patient also states she needs to see her foot doctor about his swelling in her feet. CTN helped patient locate the office number for patient. Method of communication with provider : chart routing, staff message    Discussed [id number] related testing which was not done at this time. Test results were not done. Patient informed of results, if available? no     Care Transition Nursecontacted the patient by telephone to follow up after admission on 3/5/21. Verified name and  with patient as identifiers. Addressed changes since last contact: symptom management-continues with back pain  Discharge needs reviewed: home health care, PT and SN None  Follow up appointment completed? yes Was  follow up appointment scheduled within 7 days of discharge? no     Advance Care Planning:   Does patient have an Advance Directive:  not on file     Care Transition Nurse reviewed discharge instructions, medical action plan and red flags with patient and discussed any barriers to care and/or understanding of plan of care after discharge. Discussed appropriate site of care based on symptoms and resources available to patient including: PCP, Specialist and CTN. The patient agrees to contact the PCP office for questions related to their healthcare. Patients top risk factors for readmission: medical condition asthma and diabetes    REHABILITATION [facility] follow up appointment(s): No future appointments. Non-[facility] follow up appointment(s): TBD     Plan for follow-up call in 7-10 days based on severity of symptoms and risk factors.   Plan for next call: symptom management-back pain and was patient able to get follow ups  Care Transition Nurse provided contact information for future needs.    Goals Addressed                 This Visit's Progress     Prevent complications post hospitalization. 1. CTN will monitor X 4 weeks    2. Ensure provider appt is scheduled within 7 days post-discharge  3/19/2021 PCP follow up 3/25/21. Patient missed her original appointment that was scheduled on 3/15/21  3. Confirm patient attended post-discharge provider apt  4/1/2021 patient attended PCP appointment 3/25/21 and had endocrinology 3/29/21  4. Complete post-visit call to confirm attendance and update care needs      5. Review/educate common or potential \"red flags\" of condition worsening    6. Evaluate adherence to medications and priority barriers to resolve        7. Instruct on adherence to medications as ordered and assess for therapeutic response and side-effects         8. Discuss and evaluate ADL performance. Provide recommendations on energy conservation, particularly related to transition home from an inpatient admission.

## 2021-04-01 NOTE — PROGRESS NOTES
4/1/2021 12:55 PM     CTN attempted to contact patient for routine follow up. Message left introducing myself, the purpose of the call and giving my contact information. Requested that patient call back at her earliest convenience.

## 2021-04-05 ENCOUNTER — TELEPHONE (OUTPATIENT)
Dept: INTERNAL MEDICINE CLINIC | Age: 71
End: 2021-04-05

## 2021-04-06 RX ORDER — METHOCARBAMOL 500 MG/1
500 TABLET, FILM COATED ORAL 3 TIMES DAILY
Qty: 20 TAB | Refills: 0 | Status: SHIPPED | OUTPATIENT
Start: 2021-04-06 | End: 2021-04-14 | Stop reason: SDUPTHER

## 2021-04-06 NOTE — TELEPHONE ENCOUNTER
Pharmacy Progress Note - Telephone Encounter    S/O: Ms. Dov Montiel 65 y.o. female, referred by Dr. Shaye Camacho MD, was contacted via an outbound telephone call to discuss diabetes management today. Verified patients identifiers (name & ) per HIPAA policy. Will be following with patient weekly until she re-establishes with Endocrinology at the end of this month. Current antidiabetic regimen:  - Levemir 27 units once daily at bedtime   - Humalog 10 units TID before meals   - Januvia 25 mg once daily     Interim Update:  - Patient was very dizzy as we were discussing her blood sugars over the phone. She states recent readings have been down to the 90s-low 100s  - The lowest reading she has seen has been 76  - Patient did state that she had also been giving 10 units of Humalog at bedtime along with her Levemir  - She checked blood sugar while we were on the phone together and her reading was 119  - She also states that she still needs a muscle relaxer because the metaxalone is too expensive for her    A/P:  - Instructed patient to stop taking the Humalog at bedtime  - Will also decrease Humalog dose to 8 units TID before meals since pre-prandial readings appear to be in the low 100s consistently   - Will continue Levemir 27 units daily for now  - Will notify PCP regarding the metaxalone   - Patient endorses understanding to the provided information. All questions answered at this time. There are no discontinued medications. No orders of the defined types were placed in this encounter. Thank you,  Tereso Sagastume.  Mahi Luz, PHARMD, [facility]        CLINICAL PHARMACY CONSULT: MED RECONCILIATION/REVIEW ADDENDUM    For Pharmacy Admin Tracking Only    PHSO: PHSO Patient?: Yes  Total # of Interventions Recommended: Count: 1  - Decreased Dose #: 1  Total Interventions Accepted: 1  Time Spent (min): 30

## 2021-04-07 ENCOUNTER — TELEPHONE (OUTPATIENT)
Dept: FAMILY MEDICINE CLINIC | Age: 71
End: 2021-04-07

## 2021-04-07 NOTE — TELEPHONE ENCOUNTER
Pt called and would like to discuss her medication. Pt stated this has been going on for too long. Please call pt at your earliest convenience.

## 2021-04-08 NOTE — TELEPHONE ENCOUNTER
Patient identified with 2 identifiers (name and ). Spoke with patient and she was asking about the skelaxin and medication being costly. Explained to patient that Dr. Rosa Armenta sent a prescription for Robaxin to [address] [place] [address]. Patient verbalizes understanding.

## 2021-04-14 ENCOUNTER — PATIENT OUTREACH (OUTPATIENT)
Dept: CASE MANAGEMENT | Age: 71
End: 2021-04-14

## 2021-04-14 NOTE — PROGRESS NOTES
Patient has graduated from the Transitions of Care Coordination  program on 4/14/2021. Patient's symptoms are stable at this time. Patient/family has the ability to self-manage. Care management goals have been completed at this time. No further care transitions nurse follow up scheduled. Goals Addressed                 This Visit's Progress     COMPLETED: Prevent complications post hospitalization. 1. CTN will monitor X 4 weeks    2. Ensure provider appt is scheduled within 7 days post-discharge  3/19/2021 PCP follow up 3/25/21. Patient missed her original appointment that was scheduled on 3/15/21  3. Confirm patient attended post-discharge provider apt  4/1/2021 patient attended PCP appointment 3/25/21 and had endocrinology 3/29/21  4. Complete post-visit call to confirm attendance and update care needs      5. Review/educate common or potential \"red flags\" of condition worsening    6. Evaluate adherence to medications and priority barriers to resolve        7. Instruct on adherence to medications as ordered and assess for therapeutic response and side-effects         8. Discuss and evaluate ADL performance. Provide recommendations on energy conservation, particularly related to transition home from an inpatient admission. Patient has care transitions nurse contact information for any further questions, concerns, or needs. Patient's upcoming visits:  No future appointments.

## 2021-04-16 ENCOUNTER — PATIENT OUTREACH (OUTPATIENT)
Dept: CASE MANAGEMENT | Age: 71
End: 2021-04-16

## 2021-04-16 ENCOUNTER — TELEPHONE (OUTPATIENT)
Dept: FAMILY MEDICINE CLINIC | Age: 71
End: 2021-04-16

## 2021-04-16 RX ORDER — PEN NEEDLE, DIABETIC 31 GX3/16"
NEEDLE, DISPOSABLE MISCELLANEOUS
Qty: 100 PEN NEEDLE | Refills: 11 | Status: SHIPPED | OUTPATIENT
Start: 2021-04-16 | End: 2021-04-22 | Stop reason: SDUPTHER

## 2021-04-16 RX ORDER — INSULIN ASPART 100 [IU]/ML
6 INJECTION, SOLUTION INTRAVENOUS; SUBCUTANEOUS
Qty: 12 ADJUSTABLE DOSE PRE-FILLED PEN SYRINGE | Refills: 0 | Status: SHIPPED | OUTPATIENT
Start: 2021-04-16 | End: 2021-04-22 | Stop reason: SDUPTHER

## 2021-04-16 RX ORDER — METHOCARBAMOL 500 MG/1
500 TABLET, FILM COATED ORAL 3 TIMES DAILY
Qty: 20 TAB | Refills: 0 | Status: SHIPPED | OUTPATIENT
Start: 2021-04-16 | End: 2021-04-22

## 2021-04-16 NOTE — TELEPHONE ENCOUNTER
MD Cameron Alejo LPN   Caller: Unspecified (2 days ago,  4:16 PM)             Refill orders done   Future insulin refills from endo   Also can you have home nurse evaluate this back pain with radicular s/sx? Consider PT   And needs ff-up appt soon      Spoke with Neyda at [address] at [facility] ([phone]). Name and  verified. Per Dr. Keatno Kelley requesting advising [address] nurse evaluate patients back for radicular /lower extremity signs and symptoms and notify of + findings. Patient is already doing physical therapy at home.

## 2021-04-16 NOTE — PROGRESS NOTES
Social Work Note  2021    Date of referral: 2020  Referral received from: [facility] - Melinda Nicole RN  Reason for referral: To assist with resources needed     Previous  Referral:  no   If yes, brief summary and outcome:  no     Support System: friends, brother     Community Providers: PCP, Endocrinologist, [address] [place] [address]     Transportation: IRide, granddaughter, [address] [facility]     Medication:n/a     Financial Concern: Limited income     Advance Care Plan:  not on file; education provided      Other: n/a     Identified Needs:      · Patient considering to move in the future. · Patient needing [address] Work Plan:     · Research and provide the patient with community resources for housing opportunities. · Assist patient with contacting  to request a UAI in order to initiate process of Medicaid application.     MSW received the referral from [facility] to assist the patient with needed community resources.      The patient is a 65year old female who resides independently in the community.     The patient verified her name and  as identifiers. MSW  Introduced self, role and reason for call. The patient was in high spirits and shared that she is feeling much better. She reported that she was recently admitted into the hospital ([facility]). She informed MSW that she that she is currently receiving home health services since her discharge. The patient mentioned that she completed form for Meals [employer] Wheels to restart. She shared that she has not been able to reach out to Mom's meals and is okay with the Meals on Wheels. The patient stated that her daughter will be coming over to assist her with some documents that needs to be filled out. The patient did not have any request for needed community resources during this call. MSW will continue to follow to assist the patient as needed.

## 2021-04-20 ENCOUNTER — TELEPHONE (OUTPATIENT)
Dept: INTERNAL MEDICINE CLINIC | Age: 71
End: 2021-04-20

## 2021-04-21 NOTE — TELEPHONE ENCOUNTER
Pharmacy Progress Note - Telephone Encounter    S/O: Ms. Jane Henderson 65 y.o. female, referred by Dr. Janae Bond MD, was contacted via an outbound telephone call to discuss diabetes management today. Verified patients identifiers (name & ) per HIPAA policy. Current antidiabetic regimen:  - Levemir 27 units once daily at bedtime   - Humalog 8 units TID before meals   - Januvia 25 mg once daily     Interim Update:  - Patient states that she has been doing well over the past few weeks  - Blood sugars have not been as low as they previously were since she stopped taking Humalog at night and decreased the dose to 8 units before meals   - Blood sugars have ranged in the upper 80s - upper 100s per her report. She reports dizziness has gotten better   - The highest reading she's seen has been 278 last night but she is unsure why   - This morning, her fasting blood sugar was 136  - At the time of the call, her blood sugar was 86, but she had not eaten anything yet that day    A/P:  - Instructed patient to continue current insulin regimen   - Encouraged her to make sure she is eating regular meals throughout the day to keep her blood sugars from dropping to low  - Patient is to call PharmD if blood sugars drop below 80   - Will sign off for now as patient is re-establishing with Endocrinology next week   - Patient endorses understanding to the provided information. All questions answered at this time. Thank you,  Eric Baltazar.  CHELI Mckeon        For Pharmacy Admin Tracking Only     CPA in place: YES    Time Spent (min): 15

## 2021-04-22 ENCOUNTER — TELEPHONE (OUTPATIENT)
Dept: FAMILY MEDICINE CLINIC | Age: 71
End: 2021-04-22

## 2021-04-22 RX ORDER — PEN NEEDLE, DIABETIC 31 GX3/16"
NEEDLE, DISPOSABLE MISCELLANEOUS
Qty: 100 PEN NEEDLE | Refills: 11 | Status: ON HOLD | OUTPATIENT
Start: 2021-04-22

## 2021-04-22 RX ORDER — INSULIN ASPART 100 [IU]/ML
6 INJECTION, SOLUTION INTRAVENOUS; SUBCUTANEOUS
Qty: 12 ADJUSTABLE DOSE PRE-FILLED PEN SYRINGE | Refills: 0 | Status: ON HOLD | OUTPATIENT
Start: 2021-04-22

## 2021-04-22 RX ORDER — METHOCARBAMOL 500 MG/1
TABLET, FILM COATED ORAL
Qty: 20 TAB | Refills: 0 | Status: SHIPPED | OUTPATIENT
Start: 2021-04-22 | End: 2022-07-26

## 2021-04-22 NOTE — TELEPHONE ENCOUNTER
I received a call from the [facility] and was connected to talk to patient. Patient is on insulin and has called her pharmacy who said they do not have a prescription for her refills on the insulin. Patient is almost out of her insulin. From the records I see her medication was sent in on the 16th of this month and I did let the patient know of this. I will resend the prescriptions for Levemir and NovoLog. She will go to the pharmacy at [facility] to  her medication.     Greg Heredia MD

## 2021-04-27 ENCOUNTER — TELEPHONE (OUTPATIENT)
Dept: FAMILY MEDICINE CLINIC | Age: 71
End: 2021-04-27

## 2021-04-27 NOTE — TELEPHONE ENCOUNTER
Fax received from [address] [place] [address] meds stating prior auth is required for the Metaxalone 400MG Tablets. Submit a PA request  1. Go to key. [employer] and click \"Enter a Key\"  2. Patient last name: Luba Doran      : 1950      Key: [id number]  3.  Click \"start a PA\", complete the form, and \"send to plan\"

## 2021-04-28 ENCOUNTER — PATIENT OUTREACH (OUTPATIENT)
Dept: CASE MANAGEMENT | Age: 71
End: 2021-04-28

## 2021-04-28 NOTE — PROGRESS NOTES
Social Work Note  2021    Date of referral: 2020  Referral received from: [facility] - Naveed Nicole RN  Reason for referral: To assist with resources needed     Previous  Referral:  no   If yes, brief summary and outcome:  no     Support System: friends, brother     Community Providers: PCP, Endocrinologist, [address] [place] [address] Association     Transportation: IRide, granddaughter, [facility]     Medication:n/a     Financial Concern: Limited income     Advance Care Plan:  not on file; education provided      Other: n/a     Identified Needs:      · Patient considering to move in the future. · Patient needing [address] [employer] [employer] Work Plan:     · Research and provide the patient with community resources for housing opportunities. · Assist patient with contacting  to request a UAI in order to initiate process of Medicaid application.     MSW received the referral from [facility] to assist the patient with needed community resources.      The patient is a 65year old female who resides independently in the community.      The patient verified her name and  as identifiers. MSW introduced self, role and reason for call. The patient reported that she is doing well. She mentioned that she is receiving home health for therapy and a nurse comes out to see her. The patient shared that she has learned how to order her groceries and have them delivered to her home. The patient was in high spirits. The patient mentioned that she is considering to move. MSW extended to assist her with researching senior living apartments. She shared that her preference is to remain in [place]. The patient shared that she had another call coming in and ended the conversation. MSW will follow to assist the patient as needed.

## 2021-05-04 NOTE — TELEPHONE ENCOUNTER
Prior Verena Martinez has been submitted to [facility] for approval.   Status pending

## 2021-05-07 ENCOUNTER — PATIENT OUTREACH (OUTPATIENT)
Dept: CASE MANAGEMENT | Age: 71
End: 2021-05-07

## 2021-05-07 LAB — HBA1C MFR BLD HPLC: 7.8 %

## 2021-05-07 RX ORDER — SITAGLIPTIN 50 MG/1
TABLET, FILM COATED ORAL
Qty: 90 TAB | Refills: 3 | OUTPATIENT
Start: 2021-05-07

## 2021-05-07 RX ORDER — PRAVASTATIN SODIUM 40 MG/1
TABLET ORAL
Qty: 90 TAB | Refills: 0 | Status: ON HOLD | OUTPATIENT
Start: 2021-05-07

## 2021-05-07 NOTE — PROGRESS NOTES
Initial Contact Social Work Note - Ambulatory  5/7/2021      Date of referral: 2/24/2020  Referral received from: SILKE Moran RN  Reason for referral: To assist with identified needs    Previous SW Referral: No    If yes, brief summary and outcome:  n/a    Two Identifiers Verified: Yes    Insurance Provider: [address] [place] [address] Maribel: Adult Child/Children, Sibling(s), [employer]  and Community Provider     [place] Status: No  affiliation    Community Providers: [employer], Local Agency on [address] [facility], [address] [place] [address] Provider and Home Health     ADL Assistance: N/A    Transportation Concern: None. Patient receives transportation services from [employer], her granddaughter and [employer]    Medication Cost Concern: None identified    Medication Education or Adherence Concern: Currently receiving home health services    Financial Concern(s): None identified    Ability to Read/Write: No     Advance Care Plan:  Not on file; education provided    Other: n/a    Identified Needs:      Patient needing assistance to locate senior living apartment within her financial means      Social Work Plan:     Refer the patient to 2-3 community resources for Senior living      MsLuke Reis is a 65year old female who resides independently in the community. She is independent with her ADLs and IADLs. MSW received referral to assist the patient with any identified needs. During last conversation, the patient mentioned her interest to move to a Senior living apartment within her financial means. MSW contacted the patient today but she informed MSW that she was currently at the doctor's office. MSW will continue to follow to assist the patient as needed.

## 2021-05-14 ENCOUNTER — PATIENT OUTREACH (OUTPATIENT)
Dept: CASE MANAGEMENT | Age: 71
End: 2021-05-14

## 2021-05-14 NOTE — PROGRESS NOTES
Social Work Note  2021    Date of referral: 2020  Referral received from: SILKE Joyce RN  Reason for referral: To assist with identified needs     Previous  Referral: No    If yes, brief summary and outcome:  n/a     Two Identifiers Verified: Yes     Insurance Provider: VA Medicare Part A & B, [facility]     Support System: Adult Child/Children, Sibling(s), [facility] affiliation     Community Providers: Meals on Wheels, [employer] [facility], [address] Provider and Home Health      ADL Assistance: N/A     Transportation Concern: None. Patient receives transportation services from [employer], her granddaughter and [employer]     Medication Cost Concern: None identified     Medication Education or Adherence Concern: Currently receiving home health services     Financial Concern(s): None identified     Ability to [address] on file; education provided     Other: n/a     Identified Needs:      · Patient needing assistance to locate senior living apartment within her financial means        Social Work Plan:     · Refer the patient to 2-3 community resources for Senior living        Ms. Georges Abel is a 65year old female who resides independently in the community. She is independent with her ADLs and IADLs. MSW received referral to assist the patient with any identified needs. The patient returned call and identified self with name and . The patient reported that she was hospitalized earlier this month due to low blood sugar. Patient stated that she is doing better. The patient is interested in looking for affordable apartment and MSW is assisting. The patient's income is very low and MSW will research on income based apartments as option for the patient. The patient is agreeable with plan.    The patient was awaiting for her niece to stop by to assist her with placing order for groceries. The patient had no other concerns. MSW will follow to assist the patient with any needs.

## 2021-05-14 NOTE — PROGRESS NOTES
Social Work Note  5/14/2021    Date of referral: 2/24/2020  Referral received from: [facility] - Vivi Collet, RN  Reason for referral: To assist with identified needs     Previous  Referral: No    If yes, brief summary and outcome:  n/a     Two Identifiers Verified: Yes     Insurance Provider: [employer]     Support System: Adult Child/Children, Sibling(s), Denominational  and Community Provider      [place] Status: No  affiliation     Community Providers: Meals on Wheels, [employer], [address] [place] [address] Provider and Home Health      ADL Assistance: N/A     Transportation Concern: None. Patient receives transportation services from [employer], her granddaughter and [employer]     Medication Cost Concern: None identified     Medication Education or Adherence Concern: Currently receiving home health services     Financial Concern(s): None identified     Ability to Read/Write: No      Advance Care Plan:  Not on file; education provided     Other: n/a     Identified Needs:      · Patient needing assistance to locate senior living apartment within her financial means        Social Work Plan:     · Refer the patient to 2-3 community resources for Senior living        Ms. Dominick Benjamin is a 65year old female who resides independently in the community. She is independent with her ADLs and IADLs. MSW received referral to assist the patient with any identified needs. During last conversation, the patient mentioned her interest to move to a Senior living apartment within her financial means. MSW attempted to reach the patient by telephone. Left message with information to return call. Will re-attempt to reach the patient at a later time.

## 2021-05-18 ENCOUNTER — TELEPHONE (OUTPATIENT)
Dept: INTERNAL MEDICINE CLINIC | Age: 71
End: 2021-05-18

## 2021-05-18 NOTE — TELEPHONE ENCOUNTER
Pharmacy Progress Note - Telephone Call    Ms. Anahi Palma 65 y.o. was contacted via an outbound telephone call regarding diabetes management today. A voicemail was left for patient to return my call. Thank you,  Golden Nunez. CHELI Barragan      For Pharmacy Admin Tracking Only     CPA in place:  Yes   Time Spent (min): 5

## 2021-05-18 NOTE — TELEPHONE ENCOUNTER
Pharmacy Progress Note - Telephone Encounter    S/O: Ms. Walker Host 65 y.o. female, referred by Dr. Brianne Rowley MD, was contacted via an outbound telephone call to discuss diabetes management today. Verified patients identifiers (name & ) per HIPAA policy. Current antidiabetic regimen:  - Levemir 27 units once daily at bedtime   - Humalog 6 units TID after meals   - Januvia 25 mg once daily      Interim Update:  - Patient had recent ED visit for hypoglycemia a few weeks ago secondary to irregular meals/eating habits    - She reports still having several readings of low blood sugars, 80s - low 100s on average, with one reading down to 47  - This morning, she reports her fasting reading was 80  - She states that she has followed up with endocrinology (on ), but there were no changes made to her medication regimen. She does report that they may be working on getting her a CGM device so she can better monitor her readings   - She has started to give her mealtime insulin after her meals (instead of before) to help prevent any lows in case there is a lag before she eats her meals      A/P:  - Instructed patient to decrease Levemir to 25 units once daily (as previously discussed with patient by PCP)  - Can continue Humalog 6 units TID for now, but may consider discontinuing mealtime insulin or changing to a GLP-1 agonist in the future if hypoglycemia continues to be an issue. - Will make recommendation to endocrinology if needed  - Will continue to follow every 3-4 weeks per request from PCP  - Patient endorses understanding to the provided information. All questions answered at this time. There are no discontinued medications. No orders of the defined types were placed in this encounter. Thank you,  Christen Cartwright. CHELI Sanders      For Pharmacy Admin Tracking Only     CPA in place:  Yes   Recommendation Provided To: Patient/Caregiver: 1 via Telephone   Intervention Detail: Dose Adjustment: 1, reason: Therapy De-escalation   Total # of Interventions Recommended: 1   Total # of Interventions Accepted: 1   Intervention Accepted By: Patient/Caregiver: 1   Time Spent (min): 20

## 2021-05-19 ENCOUNTER — PATIENT OUTREACH (OUTPATIENT)
Dept: CASE MANAGEMENT | Age: 71
End: 2021-05-19

## 2021-05-19 NOTE — PROGRESS NOTES
Social Work Note  5/19/2021    Date of referral: 2/24/2020  Referral received from: SILKE Moran RN  Reason for referral: To assist with identified needs     Previous  Referral: No    If yes, brief summary and outcome:  n/a     Two Identifiers Verified: Yes     Insurance Provider: VA Medicare Part A & B, [facility]     Support System: Adult Child/Children, Sibling(s), [address] [facility] affiliation     Community Providers: Meals on Wheels, [employer], [address] [facility] Provider and Home Health      ADL Assistance: N/A     Transportation Concern: None. Patient receives transportation services from [employer], her granddaughter and [employer]     Medication Cost Concern: None identified     Medication Education or Adherence Concern: Currently receiving home health services     Financial Concern(s): None identified     Ability to [address] on file; education provided     Other: n/a     Identified Needs:      · Patient needing assistance to locate senior living apartment within her financial means        Social Work Plan:     · Refer the patient to 2-3 community resources for Senior living        MsLuke Reis is a 65year old female who resides independently in the community. She is independent with her ADLs and IADLs. MSW received referral to assist the patient with any identified needs. MSW received phone call from the patient. The patient did not have any concerns but wanted to talk with MSW. The conversation was random. The patient shared that she has consulted with [facility] and is awaiting to be scheduled for an assessment. The patient requested for MSW to assist with expediting process. MSW informed the patient that she will call for updates and provide her with information. MSW will follow to assist the patient as needed.

## 2021-05-21 RX ORDER — SITAGLIPTIN 50 MG/1
TABLET, FILM COATED ORAL
Qty: 90 TABLET | Refills: 3 | OUTPATIENT
Start: 2021-05-21

## 2021-05-25 ENCOUNTER — PATIENT OUTREACH (OUTPATIENT)
Dept: CASE MANAGEMENT | Age: 71
End: 2021-05-25

## 2021-05-25 NOTE — PROGRESS NOTES
Social Work Note  5/25/2021    Date of referral: 2/24/2020  Referral received from: SILKE Li RN  Reason for referral: To assist with identified needs     Previous  Referral: No    If yes, brief summary and outcome:  n/a     Two Identifiers Verified: Yes     Insurance Provider: VA Medicare Part A & B, [facility]     Support System: Adult Child/Children, Sibling(s), [address] [facility] affiliation     Community Providers: Meals on Wheels, [employer], [address] [facility] Provider and Home Health      ADL Assistance: N/A     Transportation Concern: None. Patient receives transportation services from [employer], her granddaughter and [employer]     Medication Cost Concern: None identified     Medication Education or Adherence Concern: Currently receiving home health services     Financial Concern(s): None identified     Ability to [address] on file; education provided     Other: n/a     Identified Needs:      · Patient needing assistance to locate senior living apartment within her financial means        Social Work Plan:     · Refer the patient to 2-3 community resources for Senior living        Ms. Moon Rose is a 65year old female who resides independently in the community. She is independent with her ADLs and IADLs. MSW received referral to assist the patient with any identified needs. MSW contacted the patient and  introduced self, role and reason for call. MSW informed the patient that the [facility] has scheduled her to be seen tomorrow. Patient reported that she received a call from [facility] today notifying her of the appointment. She mentioned that they had not given her a time but will call back to provide her with updates. The patient stated that she is doing okay. MSW shared with the patient on updates regarding senior living housing researched.  The patient shared that she was not willing to pay the amount asked due to her limited income. MSW will continue to research other options in the community. MSW will continue to follow to assist the patient as needed.

## 2021-05-28 ENCOUNTER — PATIENT OUTREACH (OUTPATIENT)
Dept: CASE MANAGEMENT | Age: 71
End: 2021-05-28

## 2021-05-28 NOTE — PROGRESS NOTES
Social Work Note  5/28/2021    Date of referral: 2/24/2020  Referral received from: SILKE Nagy RN  Reason for referral: To assist with identified needs     Previous  Referral: No    If yes, brief summary and outcome:  n/a     Two Identifiers Verified: Yes     Insurance Provider: VA Medicare Part A & B, [facility]     Support System: Adult Child/Children, Sibling(s), [address] [facility] affiliation     Community Providers: Meals on Wheels, [employer], [address] [facility] Provider and Home Health      ADL Assistance: N/A     Transportation Concern: None. Patient receives transportation services from [employer], her granddaughter and [employer]     Medication Cost Concern: None identified     Medication Education or Adherence Concern: Currently receiving home health services     Financial Concern(s): None identified     Ability to [address] on file; education provided     Other: n/a     Identified Needs:      · Patient needing assistance to locate senior living apartment within her financial means        Social Work Plan:     · Refer the patient to 2-3 community resources for Senior living        Ms. Coley Spatz is a 65year old female who resides independently in the community. She is independent with her ADLs and IADLs. MSW received referral to assist the patient with any identified needs.   MSW contacted the patient and  introduced self, role and reason for call. The patient reported that she was seen by the [facility] as scheduled. She is pleased with initial visit and will move forward with having them as her PCP. The patient continues to receive home health services for skill nursing and PT. She informed MSW that she is progressing and doing better with her ambulation. MSW shared information with patient on some Senior living apartments.     MSW will continue research and will assist the patient as needed.

## 2021-06-04 ENCOUNTER — PATIENT OUTREACH (OUTPATIENT)
Dept: CASE MANAGEMENT | Age: 71
End: 2021-06-04

## 2021-06-04 NOTE — PROGRESS NOTES
Social Work Note  6/4/2021    Date of referral: 2/24/2020  Referral received from: SILKE Vaughn RN  Reason for referral: To assist with identified needs     Previous  Referral: No    If yes, brief summary and outcome:  n/a     Two Identifiers Verified: Yes     Insurance Provider: VA Medicare Part A & B, [facility]     Support System: Adult Child/Children, Sibling(s), [address] [facility] affiliation     Community Providers: Meals on Wheels, [employer], [address] Provider and Home Health      ADL Assistance: N/A     Transportation Concern: None. Patient receives transportation services from [employer], her granddaughter and [employer]     Medication Cost Concern: None identified     Medication Education or Adherence Concern: Currently receiving home health services     Financial Concern(s): None identified     Ability to [address] on file; education provided     Other: n/a     Identified Needs:      · Patient needing assistance to locate senior living apartment within her financial means        Social Work Plan:     · Refer the patient to 2-3 community resources for Senior living        Ms. Finn Barnes is a 65year old female who resides independently in the community. She is independent with her ADLs and IADLs. MSW received referral to assist the patient with any identified needs.   MSW contacted the patient and  introduced self, role and reason for call. The patient reported that she received PT and did her therapy outdoors. She mentioned that she is doing much better. MSW shared with patient on apartments in [place]. The patient shared that she has bills that she wants to pay off before considering apartments above $700 monthly. MSW informed the patient on an apartment complex under $600 but they currently don't have any availability. The patient verbalized understanding.  The patient was in good spirits. They patient had no other issues or concerns at this time. MSW will continue to follow to assist as needed.

## 2021-06-04 NOTE — PROGRESS NOTES
Social Work Note  6/4/2021    Date of referral: 2/24/2020  Referral received from: SILKE Guevara RN  Reason for referral: To assist with identified needs     Previous SW Referral: No    If yes, brief summary and outcome:  n/a     Two Identifiers Verified: Yes     Insurance Provider: VA Medicare Part A & B, [facility]     Support System: Adult Child/Children, Sibling(s), [address] [facility] affiliation     Community Providers: Meals [employer] Wheels, [employer], [address] [facility] Provider and Home Health      ADL Assistance: N/A     Transportation Concern: None. Patient receives transportation services from [employer], her granddaughter and [employer]     Medication Cost Concern: None identified     Medication Education or Adherence Concern: Currently receiving home health services     Financial Concern(s): None identified     Ability to [address] on file; education provided     Other: n/a     Identified Needs:      · Patient needing assistance to locate senior living apartment within her financial means        Social Work Plan:     · Refer the patient to 2-3 community resources for Senior living        Ms. Baltazar Elizalde is a 65year old female who resides independently in the community. She is independent with her ADLs and IADLs. MSW received referral to assist the patient with any identified needs.     MSW attempted to reach the patient by telephone. Left message with information to return call. MSW will attempt to reach the patient at a later time.

## 2021-06-11 ENCOUNTER — PATIENT OUTREACH (OUTPATIENT)
Dept: CASE MANAGEMENT | Age: 71
End: 2021-06-11

## 2021-06-11 NOTE — PROGRESS NOTES
MSW attempted to contact the patient for follow up. Left message with information to return call. Will attempt to reach the patient at a later time.

## 2021-06-15 ENCOUNTER — TELEPHONE (OUTPATIENT)
Dept: INTERNAL MEDICINE CLINIC | Age: 71
End: 2021-06-15

## 2021-06-15 NOTE — TELEPHONE ENCOUNTER
Pharmacy Progress Note - Telephone Call    Ms. Cisse Given 66 y.o. was contacted via an outbound telephone call regarding diabetes management today. A voicemail was left for patient to return my call. Thank you,  Mary Anne Coreas. CHELI Agustin        For Pharmacy Admin Tracking Only     CPA in place:  Yes   Time Spent (min): 5

## 2021-06-15 NOTE — TELEPHONE ENCOUNTER
Pharmacy Progress Note - Telephone Encounter    S/O: Ms. Penny Clemens 66 y.o. female contacted office/me via an inbound telephone call to discuss diabetes management today. Verified patients identifiers (name & ) per HIPAA policy.     - Patient states that she has been doing well over the past month or so  - She states that she has followed up with endocrinology and it sounds like they may be getting her set up with an insulin smart pen device or pump. Patient wasn't sure of the name, but states that she is waiting on a call from them to come in for training on the new device soon   - In the meantime, she is still on the same basal-bolus insulin regimen and reports that her readings ae still \"up and down\". However, she does not report any concerns with low blood sugars recently     A/P:  - Patient to continue diabetes management per Endocrinology instructions   - Encouraged her to reach out to PharmD if she has any questions or concerns in between appointments with them  - Will continue to check in every 4-6 weeks per request from PCP  - Patient endorses understanding to the provided information. All questions answered at this time. There are no discontinued medications. No orders of the defined types were placed in this encounter. Thank you,  Amanda Sow. CHELI Alanis        For Pharmacy Admin Tracking Only     CPA in place:  Yes   Recommendation Provided To: Patient/Caregiver: 1 via Telephone   Time Spent (min): 15

## 2021-06-16 ENCOUNTER — PATIENT OUTREACH (OUTPATIENT)
Dept: CASE MANAGEMENT | Age: 71
End: 2021-06-16

## 2021-06-16 NOTE — PROGRESS NOTES
MSW received referral to assist the patient with any needs identified. MSW attempted to reach the patient by telephone. Left message requesting a return call. Will attempt to reach the patient at a later time.

## 2021-06-17 ENCOUNTER — PATIENT OUTREACH (OUTPATIENT)
Dept: CASE MANAGEMENT | Age: 71
End: 2021-06-17

## 2021-06-17 NOTE — PROGRESS NOTES
Social Work Note  6/17/2021  Date of referral: 2/24/2020  Referral received from: SILKE Padilla RN  Reason for referral: To assist with identified needs     Previous  Referral: No    If yes, brief summary and outcome:  n/a     Two Identifiers Verified: Yes     Insurance Provider: VA Medicare Part A & B, [facility]     Support System: Adult Child/Children, Sibling(s), [address] [facility] affiliation     Community Providers: Meals [employer] Wheels, [employer], [address] [facility] Provider and Home Health      ADL Assistance: N/A     Transportation Concern: None. Patient receives transportation services from [employer], her granddaughter and [employer]     Medication Cost Concern: None identified     Medication Education or Adherence Concern: Currently receiving home health services     Financial Concern(s): None identified     Ability to [address] on file; education provided     Other: n/a     Identified Needs:      · Patient needing assistance to locate senior living apartment within her financial means        Social Work Plan:     · Refer the patient to 2-3 community resources for Senior living     MSW received return call from the patient. Patient stated that she has been receiving so many calls and was not sure who was calling her. The patient was alert and oriented and asked MSW to assist her with completing a form. MSW assisted the patient as requested. The patient mentioned that she contacted  some of the apartment complexes provided to her and is waiting for a response. She shared that she is doing well. She had no issues or concerns to report. MSW will follow to assist as needed.

## 2021-06-22 ENCOUNTER — TELEPHONE (OUTPATIENT)
Dept: FAMILY MEDICINE CLINIC | Age: 71
End: 2021-06-22

## 2021-06-25 NOTE — TELEPHONE ENCOUNTER
Patient called PharmD to share that she changed healthcare providers and is no longer following with Dr. Kimberly Herrera. It is unclear who she is following with now, but it sounds like it may be a home health provider. Overall, patient states that she is doing well and feels likes she is getting back on track with her blood sugars. She has not experienced any recent episodes of hypoglycemia. She is still following with endocrinology and is up to date with her appointments. PharmD will sign off at this time. Thank you,  Duke Sparks. CHELI Dorsey      For Pharmacy Admin Tracking Only     CPA in place:  Yes   Recommendation Provided To: Patient/Caregiver: 1 via Telephone   Time Spent (min): 15

## 2021-07-02 ENCOUNTER — PATIENT OUTREACH (OUTPATIENT)
Dept: CASE MANAGEMENT | Age: 71
End: 2021-07-02

## 2021-07-02 NOTE — PROGRESS NOTES
Social Work Note  2021    Date of referral: 2020  Referral received from: SILKE Goodman RN  Reason for referral: To assist with identified needs     Previous  Referral: No    If yes, brief summary and outcome:  n/a     Two Identifiers Verified: Yes     Insurance Provider: VA Medicare Part A & B, [facility]     Support System: Adult Child/Children, Sibling(s), [address] [facility] affiliation     Community Providers: Meals [employer] Wheels, [employer], [address] [facility] Provider and Home Health      ADL Assistance: N/A     Transportation Concern: None. Patient receives transportation services from [employer], her granddaughter and [employer]     Medication Cost Concern: None identified     Medication Education or Adherence Concern: Currently receiving home health services     Financial Concern(s): None identified     Ability to [address] on file; education provided     Other: n/a     Identified Needs:      · Patient needing assistance to locate senior living apartment within her financial means        Social Work Plan:     · Refer the patient to 2-3 community resources for Senior living     MSW received phone call from the patient. The patient verified her name and  as identifiers. She mentioned that she has not made any phone calls to resources for Senior living because she misplaced some money in her house and is currently trying find it. The patient shared that she continues to receive Meals on Wheels. Patient stated that she just needed to talk and felt that MSW is inspiring to her. The patient talked extensively on random topics. MSW was attentive, active listener, and supportive to the patient throughout the conversation. MSW will continue to provide support to the patient and will be available to assist as needed.

## 2021-07-09 ENCOUNTER — PATIENT OUTREACH (OUTPATIENT)
Dept: CASE MANAGEMENT | Age: 71
End: 2021-07-09

## 2021-07-09 NOTE — PROGRESS NOTES
Social Work Note  2021    Date of referral: 2020  Referral received from: SILKE Randall RN  Reason for referral: To assist with identified needs     Previous  Referral: No    If yes, brief summary and outcome:  n/a     Two Identifiers Verified: Yes     Insurance Provider: VA Medicare Part A & B, [facility]     Support System: Adult Child/Children, Sibling(s), [address] [facility] affiliation     Community Providers: Meals on Wheels, [employer], [address] Provider and Home Health      ADL Assistance: N/A     Transportation Concern: None. Patient receives transportation services from [employer], her granddaughter and [employer]     Medication Cost Concern: None identified     Medication Education or Adherence Concern: Currently receiving home health services     Financial Concern(s): None identified     Ability to [address] on file; education provided     Other: n/a     Identified Needs:      · Patient needing assistance to locate senior living apartment within her financial means        Social Work Plan:     · Refer the patient to 2-3 community resources for Senior living     MSW received phone call from the patient. The patient verified her name and  as identifiers. The patient mentioned that she fell yesterday and is a little sore. MSW asked if she contacted her PCP and she stated that she left a message for [facility] and is awaiting a call back. She shared that she will call them again today. The patient is an active participant in the conversation and is in good spirits. The patient had been provided with contact information to senior living apartment in [place], [place] which is her preference. She shared that she will hold off for a little while because she wants to pay off some accounts she has. The patient had no other issues or concerns at this time.      MSW will continue to follow the patient to assist with any needs.

## 2021-07-16 ENCOUNTER — PATIENT OUTREACH (OUTPATIENT)
Dept: CASE MANAGEMENT | Age: 71
End: 2021-07-16

## 2021-07-16 NOTE — PROGRESS NOTES
Social Work Note  7/16/2021    Date of referral: 2/24/2020  Referral received from: SILKE Treviño RN  Reason for referral: To assist with identified needs     Previous  Referral: No    If yes, brief summary and outcome:  n/a     Two Identifiers Verified: Yes     Insurance Provider: VA Medicare Part A & B, [facility]     Support System: Adult Child/Children, Sibling(s), [address] [facility] affiliation     Community Providers: Meals [employer] Wheels, [employer], [address] [facility] Provider and Home Health      ADL Assistance: N/A     Transportation Concern: None. Patient receives transportation services from [employer], her granddaughter and [employer]     Medication Cost Concern: None identified     Medication Education or Adherence Concern: Currently receiving home health services     Financial Concern(s): None identified     Ability to [address] on file; education provided     Other: n/a     Identified Needs:      · Patient needing assistance to locate senior living apartment within her financial means        Social Work Plan:     · Refer the patient to 2-3 community resources for Senior living     MSW contacted the patient for follow up and introduced self, role and reason for call. The patient is alert, oriented and has the ability to make her needs known. The patient is a 66year old woman who resides alone in the community. She reported that she heard from her new PCP with the [facility] and the PCP will be visiting with her next week. The patient informed MSW that her sugar levels continues to fluctuate and [facility] is monitoring her closely. She continues to hold off on contacting senior apartments as she stated that she wants to  \"get my money right\"  The patient denies any falls in the last week.  Ms. Fredderick Fleischer shared that her daughter has been assisting her with any paperwork needed and ordering her groceries for  or delivery. The patient denied any issues or concerns at this time. MSW will continue to follow the patient to assist with any needs.

## 2021-07-23 ENCOUNTER — PATIENT OUTREACH (OUTPATIENT)
Dept: CASE MANAGEMENT | Age: 71
End: 2021-07-23

## 2021-07-23 NOTE — PROGRESS NOTES
Social Work Note  7/23/2021    Date of referral: 2/24/2020  Referral received from: SILKE Han RN  Reason for referral: To assist with identified needs     Previous  Referral: No    If yes, brief summary and outcome:  n/a     Two Identifiers Verified: Yes     Insurance Provider: VA Medicare Part A & B, [facility]     Support System: Adult Child/Children, Sibling(s), [address] [facility] affiliation     Community Providers: Meals [employer] Wheels, [employer], [address] [facility] Provider and Home Health      ADL Assistance: N/A     Transportation Concern: None. Patient receives transportation services from [employer], her granddaughter and [employer]     Medication Cost Concern: None identified     Medication Education or Adherence Concern: Currently receiving home health services     Financial Concern(s): None identified     Ability to [address] on file; education provided     Other: n/a     Identified Needs:      · Patient needing assistance to locate senior living apartment within her financial means        Social Work Plan:     · Refer the patient to 2-3 community resources for Senior living     MSW contacted the patient for follow up and introduced self, role and reason for call. The patient is alert, oriented and has the ability to make her needs known. The patient is a 66year old woman who resides alone in the community. MSW attempted to reach the patient by telephone. Left message on voicemail providing information to return call. Will attempt to reach the patient at a later time.

## 2021-08-03 ENCOUNTER — PATIENT OUTREACH (OUTPATIENT)
Dept: CASE MANAGEMENT | Age: 71
End: 2021-08-03

## 2021-08-04 NOTE — PROGRESS NOTES
Social Work Note     8/3/2021    Date of referral: 2/24/2020  Referral received from: SILKE Randall RN  Reason for referral: To assist with identified needs     Previous  Referral: No    If yes, brief summary and outcome:  n/a     Two Identifiers Verified: Yes     Insurance Provider: VA Medicare Part A & B, [facility]     Support System: Adult Child/Children, Sibling(s), [facility]     Community Providers: [employer] Whe[employer], [employer], [facility] Provider and Home Health      ADL Assistance: N/A     Transportation Concern: None. Patient receives transportation services from [employer], her granddaughter and [employer]     Medication Cost Concern: None identified     Medication Education or Adherence Concern: Currently receiving home health services     Financial Concern(s): None identified     Ability to Read/Write: No      Advance Care Plan:  Not on file; education provided     Other: n/a     Identified Needs:      · Patient needing assistance to locate senior living apartment within her financial means        Social Work Plan:     · Refer the patient to 2-3 community resources for Senior living     Mrs. Andrea Almonte contacted the MSW for follow up. The patient is alert, oriented and has the ability to make her needs known. The patient is a 66year old woman who resides alone in the community. She stated that she had a fall last week and have communicated that information to her PCP with [facility]. She denied any injury or pain. The patient shared that her PCP is scheduled to visit with her this month. The patient denied any needs for assistance at this time. She contacted MSW to have conversation on random topics. Patient indicated that speaking with MSW brings her comfort. During conversation, her brother stopped by to visit with her and call ended.      MSW will continue to follow to assist the patient as needed.

## 2021-08-25 ENCOUNTER — PATIENT OUTREACH (OUTPATIENT)
Dept: CASE MANAGEMENT | Age: 71
End: 2021-08-25

## 2021-08-26 NOTE — PROGRESS NOTES
Social Work Note     8/25/2021    Date of referral: 2/24/2020  Referral received from: SILKE Amaya RN  Reason for referral: To assist with identified needs     Previous  Referral: No    If yes, brief summary and outcome:  n/a     Two Identifiers Verified: Yes     Insurance Provider: VA Medicare Part A & B, [facility]     Support System: Adult Child/Children, Sibling(s), [facility] affiliation     Community Providers: [employer], [employer], [address] [place] [address] Provider and Home Health      ADL Assistance: N/A     Transportation Concern: None. Patient receives transportation services from [employer], her granddaughter and [employer]     Medication Cost Concern: None identified     Medication Education or Adherence Concern: Currently receiving home health services     Financial Concern(s): None identified     Ability to Read/Write: No      Advance Care Plan:  Not on file; education provided     Other: n/a     Identified Needs:      · Patient needing assistance to locate senior living apartment within her financial means        Social Work Plan:     · Refer the patient to 2-3 community resources for Senior living     Mrs. Abdoul Blake contacted the MSW for follow up. The patient is alert, oriented and has the ability to make her needs known. The patient is a 66year old woman who resides alone in the community.     Patient verbalized that she is not ready to move because there are some things she has to get done before she can proceed. The patient mentioned that she contacted MSW because she wasn't sure if follow up was discontinued. MSW informed the patient that she will continue to follow the her and will notify her when services will be discontinued. The patient verbalized understanding. The patient stated that everything has been going well and she had no issues or concerns to report at this time.  The patient updated MSW on positive things occurring in her life. She informed MSW that she resumed attending [employer] services. The patient was in good spirits. She had no issues or concerns to report at this time. MSW will follow to assist as needed.

## 2021-09-07 RX ORDER — PRAVASTATIN SODIUM 40 MG/1
40 TABLET ORAL
Qty: 90 TABLET | Refills: 0 | Status: CANCELLED | OUTPATIENT
Start: 2021-09-07

## 2021-09-17 ENCOUNTER — PATIENT OUTREACH (OUTPATIENT)
Dept: CASE MANAGEMENT | Age: 71
End: 2021-09-17

## 2021-09-17 NOTE — PROGRESS NOTES
Social Work Note  9/17/2021    Date of referral: 2/24/2020  Referral received from: SILKE Mittal RN  Reason for referral: To assist with identified needs     Previous  Referral: No    If yes, brief summary and outcome:  n/a     Two Identifiers Verified: Yes     Insurance Provider: VA Medicare Part A & B, [facility]     Support System: Adult Child/Children, Sibling(s), [address] [facility] affiliation     Community Providers: [employer], [employer], [address] [facility] Provider and Home Health      ADL Assistance: N/A     Transportation Concern: None. Patient receives transportation services from [employer], her granddaughter and [employer]     Medication Cost Concern: None identified     Medication Education or Adherence Concern: Currently receiving home health services     Financial Concern(s): None identified     Ability to [address] on file; education provided     Other: n/a     Identified Needs:      · Patient needing assistance to locate senior living apartment within her financial means        Social Work Plan:     · Refer the patient to 2-3 community resources for Senior living     MSW attempted to contact the patient. Left message with information to return call. MSW will follow to assist as needed.

## 2021-09-23 ENCOUNTER — PATIENT OUTREACH (OUTPATIENT)
Dept: CASE MANAGEMENT | Age: 71
End: 2021-09-23

## 2021-09-23 NOTE — PROGRESS NOTES
Social Work Note  2021    Date of referral: 2020  Referral received from: SILKE Rose RN  Reason for referral: To assist with identified needs     Previous  Referral: No    If yes, brief summary and outcome:  n/a     Two Identifiers Verified: Yes     Insurance Provider: VA Medicare Part A & B, [facility]     Support System: Adult Child/Children, Sibling(s), [address] [facility] affiliation     Community Providers: [employer], [employer], [address] [facility] Provider and Home Health      ADL Assistance: N/A     Transportation Concern: None. Patient receives transportation services from [employer], her granddaughter and [employer]     Medication Cost Concern: None identified     Medication Education or Adherence Concern: Currently receiving home health services     Financial Concern(s): None identified     Ability to [address] on file; education provided     Other: n/a     Identified Needs:      · Patient needing assistance to locate senior living apartment within her financial means (Patient stated that she is not ready)        Social Work Plan:     · Refer the patient to 2-3 community resources for Senior living      MSW is to assist the patient with any needs identified. The patient contacted MSW and verified her name and  as identifiers. Mrs. Lydia Ji is alert and oriented and has the ability to make her needs known. She provided MSW with positive updates on her shopping, visit with her PCP, A1C and family relationship. She had no issues or concerns to report during this call. MSW encouraged the patient to continue communicating with her PCP for any health related issues. MSW will follow to assist the patient as needed.

## 2021-10-27 ENCOUNTER — PATIENT OUTREACH (OUTPATIENT)
Dept: CASE MANAGEMENT | Age: 71
End: 2021-10-27

## 2021-10-27 NOTE — PROGRESS NOTES
Social Work Note  10/27/2021    Date of referral: 2020  Referral received from: SILKE Juarez RN  Reason for referral: To assist with identified needs     Previous  Referral: No    If yes, brief summary and outcome:  n/a     Two Identifiers Verified: Yes     Insurance Provider: VA Medicare Part A & B, [facility]     Support System: Adult Child/Children, Sibling(s), [address] [facility] affiliation     Community Providers: [employer], [employer], [address] Provider and Home Health      ADL Assistance: N/A     Transportation Concern: None. Patient receives transportation services from [employer], her granddaughter and [employer]     Medication Cost Concern: None identified     Medication Education or Adherence Concern: Currently receiving home health services     Financial Concern(s): None identified     Ability to [address] on file; education provided     Other: n/a     Identified Needs:      · Patient needing assistance to locate senior living apartment within her financial means (Patient stated that she is not ready)        Social Work Plan:     · Refer the patient to 2-3 community resources for Senior living        MSW is to assist the patient with any needs identified.      The patient contacted [facility] and verified her name and  as identifiers. Mrs. Geovani Mcfarland is alert and oriented and has the ability to make her needs known. Mrs. Geovani Mcfarland stated that she is still not ready to move at this time. She recently became a patient of [facility] and is being followed by them for care. The patient shared that her grandchildren have been ordering groceries and having it delivered to her home. She was in good spirits and continues to be receive support from her grandchildren and brother. The patient reported no issues or concerns.      There are no needs identified at this time. No further follow up will be required. MSW will be available to assist with any future.

## 2021-11-19 NOTE — TELEPHONE ENCOUNTER
Detail Level: Detailed [facility] medication list has been printed for switch in medication.

## 2022-02-17 ENCOUNTER — TELEPHONE (OUTPATIENT)
Dept: FAMILY MEDICINE CLINIC | Age: 72
End: 2022-02-17

## 2022-02-17 NOTE — TELEPHONE ENCOUNTER
Spoke with patients daughter Michael Thomson (on [facility]) Patient identified with 2 identifiers (name and ). She states that she was trying to find out who patients doctors. Patient has been lost to care. Michael Thomson states patient was admitted to [facility] yesterday.

## 2022-02-17 NOTE — TELEPHONE ENCOUNTER
----- Message from Ani Trejo sent at 2/16/2022 10:54 AM EST -----  Subject: Message to Provider    QUESTIONS  Information for Provider? patient was seen by Dr. John Garza and   daughter of the patient is requesting a callback from the  or the   Nurses's  to call them regarding patient's health and medication,   please call ASAP and advise.  ---------------------------------------------------------------------------  --------------  CALL BACK INFO  What is the best way for the office to contact you? OK to leave message on   voicemail  Preferred Call Back Phone Number? [phone]  ---------------------------------------------------------------------------  --------------  SCRIPT ANSWERS  Relationship to Patient?  Third Party  Representative Name? Luisito Alfred - daughter no hipaa document

## 2022-08-08 PROBLEM — Z86.59 H/O PARANOID SCHIZOPHRENIA: Status: ACTIVE | Noted: 2022-08-08

## 2022-08-08 PROBLEM — F91.9 BEHAVIOR DISTURBANCE: Status: ACTIVE | Noted: 2022-08-08

## 2022-08-08 PROBLEM — F03.90 DEMENTIA (HCC): Status: ACTIVE | Noted: 2022-08-08

## 2022-09-23 PROBLEM — G93.40 ACUTE ENCEPHALOPATHY: Status: ACTIVE | Noted: 2022-09-23

## 2022-10-21 PROBLEM — R41.82 ALTERED MENTAL STATUS: Status: ACTIVE | Noted: 2022-10-21

## 2025-02-05 NOTE — PATIENT INSTRUCTIONS
Migraine Headache: Care Instructions  Your Care Instructions  Migraines are painful, throbbing headaches that often start on one side of the head. They may cause nausea and vomiting and make you sensitive to light, sound, or smell. Without treatment, migraines can last from 4 hours to a few days. Medicines can help prevent migraines or stop them after they have started. Your doctor can help you find which ones work best for you. Follow-up care is a key part of your treatment and safety. Be sure to make and go to all appointments, and call your doctor if you are having problems. It's also a good idea to know your test results and keep a list of the medicines you take. How can you care for yourself at home? · Do not drive if you have taken a prescription pain medicine. · Rest in a quiet, dark room until your headache is gone. Close your eyes, and try to relax or go to sleep. Don't watch TV or read. · Put a cold, moist cloth or cold pack on the painful area for 10 to 20 minutes at a time. Put a thin cloth between the cold pack and your skin. · Use a warm, moist towel or a heating pad set on low to relax tight shoulder and neck muscles. · Have someone gently massage your neck and shoulders. · Take your medicines exactly as prescribed. Call your doctor if you think you are having a problem with your medicine. You will get more details on the specific medicines your doctor prescribes. · Be careful not to take pain medicine more often than the instructions allow. You could get worse or more frequent headaches when the medicine wears off. To prevent migraines  · Keep a headache diary so you can figure out what triggers your headaches. Avoiding triggers may help you prevent headaches. Record when each headache began, how long it lasted, and what the pain was like.  (Was it throbbing, aching, stabbing, or dull?) Write down any other symptoms you had with the headache, such as nausea, flashing lights or dark Operative Scheduling Information:     Hospital:  [facility]     Physician:  Laverne     Surgery: left carotid endarterectomy     Urgency:  Standard     Level:  Level 4: Outpatients to be scheduled for screening procedures and elective surgery that can be delayed for longer than one month without reasonable expectation of detriment to patient.     Case Length:  3 hours     Post-op Bed:  ICU     OR Table:  Standard     Equipment Needs:  Vascular technologist and [employer] EEG monitoring     Medication Instructions:  Aspirin:   Continue (do not hold)     Hydration:  No     Contrast Allergy:  no       spots, or sensitivity to bright light or loud noise. Note if the headache occurred near your period. List anything that might have triggered the headache. Triggers may include certain foods (chocolate, cheese, wine) or odors, smoke, bright light, stress, or lack of sleep. · If your doctor has prescribed medicine for your migraines, take it as directed. You may have medicine that you take only when you get a migraine and medicine that you take all the time to help prevent migraines. ? If your doctor has prescribed medicine for when you get a headache, take it at the first sign of a migraine, unless your doctor has given you other instructions. ? If your doctor has prescribed medicine to prevent migraines, take it exactly as prescribed. Call your doctor if you think you are having a problem with your medicine. · Find healthy ways to deal with stress. Migraines are most common during or right after stressful times. Take time to relax before and after you do something that has caused a migraine in the past.  · Try to keep your muscles relaxed by keeping good posture. Check your jaw, face, neck, and shoulder muscles for tension. Try to relax them. When you sit at a desk, change positions often. And make sure to stretch for 30 seconds each hour. · Get plenty of sleep and exercise. · Eat meals on a regular schedule. Avoid foods and drinks that often trigger migraines. These include chocolate, alcohol (especially red wine and port), aspartame, monosodium glutamate (MSG), and some additives found in foods (such as hot dogs, vazquez, cold cuts, aged cheeses, and pickled foods). · Limit caffeine. Don't drink too much coffee, tea, or soda. But don't quit caffeine suddenly. That can also give you migraines. · Do not smoke or allow others to smoke around you. If you need help quitting, talk to your doctor about stop-smoking programs and medicines. These can increase your chances of quitting for good.   · If you are taking birth control pills or hormone therapy, talk to your doctor about whether they are triggering your migraines. When should you call for help? Call 911 anytime you think you may need emergency care. For example, call if:    · You have signs of a stroke. These may include:  ? Sudden numbness, paralysis, or weakness in your face, arm, or leg, especially on only one side of your body. ? Sudden vision changes. ? Sudden trouble speaking. ? Sudden confusion or trouble understanding simple statements. ? Sudden problems with walking or balance. ? A sudden, severe headache that is different from past headaches.    Call your doctor now or seek immediate medical care if:    · You have new or worse nausea and vomiting.     · You have a new or higher fever.     · Your headache gets much worse.    Watch closely for changes in your health, and be sure to contact your doctor if:    · You are not getting better after 2 days (48 hours). Where can you learn more? Go to http://danika-sophia.info/. Enter X650 in the search box to learn more about \"Migraine Headache: Care Instructions. \"  Current as of: March 28, 2019  Content Version: 12.1  © 2798-6644 [employer]. Care instructions adapted under license by [employer] (which disclaims liability or warranty for this information). If you have questions about a medical condition or this instruction, always ask your healthcare professional. [address] any warranty or liability for your use of this information.